# Patient Record
Sex: MALE | Race: WHITE | NOT HISPANIC OR LATINO | Employment: OTHER | URBAN - METROPOLITAN AREA
[De-identification: names, ages, dates, MRNs, and addresses within clinical notes are randomized per-mention and may not be internally consistent; named-entity substitution may affect disease eponyms.]

---

## 2017-01-03 RX ORDER — METFORMIN HYDROCHLORIDE 1000 MG/1
TABLET ORAL
Qty: 180 TABLET | Refills: 2 | OUTPATIENT
Start: 2017-01-03

## 2017-01-04 ENCOUNTER — DOCUMENTATION ONLY (OUTPATIENT)
Dept: FAMILY MEDICINE | Facility: CLINIC | Age: 57
End: 2017-01-04

## 2017-01-04 NOTE — PROGRESS NOTES
Envelope from the  dated 09/17/2015 with lab orders for cmp, microalbumin urine in it was never picked up.  Envelope discarded.

## 2017-01-24 ENCOUNTER — PATIENT MESSAGE (OUTPATIENT)
Dept: FAMILY MEDICINE | Facility: CLINIC | Age: 57
End: 2017-01-24

## 2017-02-06 RX ORDER — METFORMIN HYDROCHLORIDE 1000 MG/1
TABLET ORAL
Qty: 180 TABLET | Refills: 2 | OUTPATIENT
Start: 2017-02-06

## 2017-02-09 ENCOUNTER — TELEPHONE (OUTPATIENT)
Dept: FAMILY MEDICINE | Facility: CLINIC | Age: 57
End: 2017-02-09

## 2017-02-09 NOTE — TELEPHONE ENCOUNTER
----- Message from Sayra Dunn sent at 2/8/2017  1:49 PM CST -----  Contact: self  Patient needs to outside order to [facility] if needed for visit. Patient's wife wanted you to know he is out of Metformin and insulin. Please call patient's wife, Mindi Morgan at [phone]. Thanks!

## 2017-02-09 NOTE — TELEPHONE ENCOUNTER
Patient has an appointment on 04/07/2017 do you want any labs done.If so put the orders in for external.

## 2017-03-07 DIAGNOSIS — I10 ESSENTIAL HYPERTENSION: ICD-10-CM

## 2017-03-07 RX ORDER — LISINOPRIL 40 MG/1
TABLET ORAL
Qty: 90 TABLET | Refills: 0 | OUTPATIENT
Start: 2017-03-07

## 2017-03-09 DIAGNOSIS — I10 ESSENTIAL HYPERTENSION: ICD-10-CM

## 2017-03-09 RX ORDER — LISINOPRIL 40 MG/1
TABLET ORAL
Qty: 90 TABLET | Refills: 0 | OUTPATIENT
Start: 2017-03-09

## 2017-04-04 ENCOUNTER — PATIENT MESSAGE (OUTPATIENT)
Dept: FAMILY MEDICINE | Facility: CLINIC | Age: 57
End: 2017-04-04

## 2017-04-04 ENCOUNTER — TELEPHONE (OUTPATIENT)
Dept: FAMILY MEDICINE | Facility: CLINIC | Age: 57
End: 2017-04-04

## 2017-04-04 DIAGNOSIS — I10 ESSENTIAL HYPERTENSION: ICD-10-CM

## 2017-04-04 RX ORDER — LISINOPRIL 40 MG/1
40 TABLET ORAL DAILY
Qty: 30 TABLET | Refills: 0 | Status: SHIPPED | OUTPATIENT
Start: 2017-04-04 | End: 2017-06-16 | Stop reason: SDUPTHER

## 2017-04-04 NOTE — TELEPHONE ENCOUNTER
Medications are ordered.  There is a place called [facility] on [address] that [facility].    [phone]

## 2017-04-07 ENCOUNTER — DOCUMENTATION ONLY (OUTPATIENT)
Dept: FAMILY MEDICINE | Facility: CLINIC | Age: 57
End: 2017-04-07

## 2017-04-07 NOTE — PROGRESS NOTES
Health Maintenance Due   Topic Date Due    Hepatitis C Screening  1960    Eye Exam  10/18/1972    TETANUS VACCINE  10/18/1980    Pneumococcal PPSV23 (Medium Risk) (1) 10/18/1980    Colonoscopy  10/18/2012    Hemoglobin A1c  07/15/2016    Influenza Vaccine  08/01/2016    Foot Exam  04/18/2017

## 2017-05-07 RX ORDER — INSULIN ASPART 100 [IU]/ML
INJECTION, SOLUTION INTRAVENOUS; SUBCUTANEOUS
Qty: 40 ML | Refills: 0 | Status: SHIPPED | OUTPATIENT
Start: 2017-05-07 | End: 2017-06-16 | Stop reason: SDUPTHER

## 2017-05-08 DIAGNOSIS — I10 ESSENTIAL HYPERTENSION: ICD-10-CM

## 2017-05-08 RX ORDER — LISINOPRIL 40 MG/1
TABLET ORAL
Qty: 30 TABLET | Refills: 0 | OUTPATIENT
Start: 2017-05-08

## 2017-05-08 RX ORDER — METFORMIN HYDROCHLORIDE 1000 MG/1
TABLET ORAL
Qty: 180 TABLET | Refills: 2 | OUTPATIENT
Start: 2017-05-08

## 2017-05-11 ENCOUNTER — DOCUMENTATION ONLY (OUTPATIENT)
Dept: FAMILY MEDICINE | Facility: CLINIC | Age: 57
End: 2017-05-11

## 2017-05-11 NOTE — PROGRESS NOTES
Health Maintenance Due   Topic Date Due    Hepatitis C Screening  1960    Eye Exam  10/18/1972    TETANUS VACCINE  10/18/1980    Pneumococcal PPSV23 (Medium Risk) (1) 10/18/1980    Colonoscopy  10/18/2012    Hemoglobin A1c  07/15/2016    Lipid Panel  04/15/2017    Foot Exam  04/18/2017

## 2017-05-12 ENCOUNTER — TELEPHONE (OUTPATIENT)
Dept: FAMILY MEDICINE | Facility: CLINIC | Age: 57
End: 2017-05-12

## 2017-05-12 NOTE — TELEPHONE ENCOUNTER
Left message to call the office.I printed his lab orders but cant [facility] [place].He can pick the orders up here if he likes.

## 2017-05-12 NOTE — TELEPHONE ENCOUNTER
----- Message from Summer Althea sent at 5/12/2017 11:13 AM CDT -----  Contact: Patient  Patient states that the blood work orders that are in the system, he would like them to be sent to [facility] in [place].  He has to re-schedule the appointment because he hadn't gotten his labs done so he can refill his prescriptions.  And the next available appointment is not until 05/22/2017 and would like to be seen sooner.  He can also come  the orders for the blood work.  Please [phone].  Thank you

## 2017-05-18 DIAGNOSIS — E11.9 TYPE 2 DIABETES, HBA1C GOAL < 7%: Primary | ICD-10-CM

## 2017-05-19 ENCOUNTER — OUTPATIENT CASE MANAGEMENT (OUTPATIENT)
Dept: ADMINISTRATIVE | Facility: OTHER | Age: 57
End: 2017-05-19

## 2017-05-19 NOTE — PROGRESS NOTES
For your information:    Please note the following patients information has been forwarded to [employer] for Case Management or .    Please see the media section in patient's chart for additional details.    Please contact Outpatient Complex care Management at ext [phone] with any questions.    Thank you,    Bianca Boothe, SSC

## 2017-05-22 ENCOUNTER — DOCUMENTATION ONLY (OUTPATIENT)
Dept: FAMILY MEDICINE | Facility: CLINIC | Age: 57
End: 2017-05-22

## 2017-05-30 DIAGNOSIS — I10 ESSENTIAL HYPERTENSION: ICD-10-CM

## 2017-05-30 RX ORDER — LISINOPRIL 40 MG/1
TABLET ORAL
Qty: 30 TABLET | Refills: 0 | OUTPATIENT
Start: 2017-05-30

## 2017-05-31 NOTE — TELEPHONE ENCOUNTER
Pt notified that lab orders are in office because we don't have the fax number for [facility].  Explained to pt that he can get fax number for us or he can come to the office and  the orders.  Pt states he will come tomorrow and  the lab orders.

## 2017-05-31 NOTE — TELEPHONE ENCOUNTER
----- Message from Sayra Dunn sent at 5/30/2017  3:19 PM CDT -----  Contact: self  Patient needs to know which [facility] the orders for blood work was sent to.  Please call patient at [phone]. Thanks!

## 2017-06-08 ENCOUNTER — PATIENT MESSAGE (OUTPATIENT)
Dept: FAMILY MEDICINE | Facility: CLINIC | Age: 57
End: 2017-06-08

## 2017-06-15 ENCOUNTER — DOCUMENTATION ONLY (OUTPATIENT)
Dept: FAMILY MEDICINE | Facility: CLINIC | Age: 57
End: 2017-06-15

## 2017-06-15 LAB
ALBUMIN SERPL-MCNC: 4.2 G/DL (ref 3.6–5.1)
ALBUMIN/CREAT UR: 103 MCG/MG CREAT
ALBUMIN/GLOB SERPL: 1.5 (CALC) (ref 1–2.5)
ALP SERPL-CCNC: 68 U/L (ref 40–115)
ALT SERPL-CCNC: 14 U/L (ref 9–46)
AST SERPL-CCNC: 14 U/L (ref 10–35)
BILIRUB SERPL-MCNC: 0.6 MG/DL (ref 0.2–1.2)
BUN SERPL-MCNC: 18 MG/DL (ref 7–25)
BUN/CREAT SERPL: ABNORMAL (CALC) (ref 6–22)
CALCIUM SERPL-MCNC: 9.8 MG/DL (ref 8.6–10.3)
CHLORIDE SERPL-SCNC: 101 MMOL/L (ref 98–110)
CHOLEST SERPL-MCNC: 136 MG/DL (ref 125–200)
CHOLEST/HDLC SERPL: 2.7 (CALC)
CO2 SERPL-SCNC: 34 MMOL/L (ref 20–31)
CREAT SERPL-MCNC: 0.78 MG/DL (ref 0.7–1.33)
CREAT UR-MCNC: 201 MG/DL (ref 20–370)
GFR SERPL CREATININE-BSD FRML MDRD: 102 ML/MIN/1.73M2
GLOBULIN SER CALC-MCNC: 2.8 G/DL (CALC) (ref 1.9–3.7)
GLUCOSE SERPL-MCNC: 161 MG/DL (ref 65–99)
HBA1C MFR BLD: 10.6 % OF TOTAL HGB
HDLC SERPL-MCNC: 51 MG/DL
LDLC SERPL CALC-MCNC: 78 MG/DL (CALC)
MICROALBUMIN UR-MCNC: 20.7 MG/DL
NONHDLC SERPL-MCNC: 85 MG/DL (CALC)
POTASSIUM SERPL-SCNC: 4.4 MMOL/L (ref 3.5–5.3)
PROT SERPL-MCNC: 7 G/DL (ref 6.1–8.1)
SODIUM SERPL-SCNC: 140 MMOL/L (ref 135–146)
TRIGL SERPL-MCNC: 35 MG/DL

## 2017-06-16 ENCOUNTER — TELEPHONE (OUTPATIENT)
Dept: FAMILY MEDICINE | Facility: CLINIC | Age: 57
End: 2017-06-16

## 2017-06-16 ENCOUNTER — OFFICE VISIT (OUTPATIENT)
Dept: FAMILY MEDICINE | Facility: CLINIC | Age: 57
End: 2017-06-16
Payer: COMMERCIAL

## 2017-06-16 VITALS
BODY MASS INDEX: 29.54 KG/M2 | HEART RATE: 100 BPM | SYSTOLIC BLOOD PRESSURE: 196 MMHG | WEIGHT: 206.38 LBS | RESPIRATION RATE: 16 BRPM | DIASTOLIC BLOOD PRESSURE: 109 MMHG | HEIGHT: 70 IN | OXYGEN SATURATION: 99 % | TEMPERATURE: 98 F

## 2017-06-16 DIAGNOSIS — L73.9 FOLLICULITIS: ICD-10-CM

## 2017-06-16 DIAGNOSIS — E78.5 HYPERLIPIDEMIA ASSOCIATED WITH TYPE 2 DIABETES MELLITUS: ICD-10-CM

## 2017-06-16 DIAGNOSIS — E11.59 HYPERTENSION ASSOCIATED WITH DIABETES: Primary | ICD-10-CM

## 2017-06-16 DIAGNOSIS — I15.2 HYPERTENSION ASSOCIATED WITH DIABETES: Primary | ICD-10-CM

## 2017-06-16 DIAGNOSIS — E11.69 HYPERLIPIDEMIA ASSOCIATED WITH TYPE 2 DIABETES MELLITUS: ICD-10-CM

## 2017-06-16 DIAGNOSIS — N52.9 ERECTILE DYSFUNCTION, UNSPECIFIED ERECTILE DYSFUNCTION TYPE: ICD-10-CM

## 2017-06-16 PROCEDURE — 99214 OFFICE O/P EST MOD 30 MIN: CPT | Mod: S$GLB,,, | Performed by: INTERNAL MEDICINE

## 2017-06-16 PROCEDURE — 4010F ACE/ARB THERAPY RXD/TAKEN: CPT | Mod: S$GLB,,, | Performed by: INTERNAL MEDICINE

## 2017-06-16 PROCEDURE — 3046F HEMOGLOBIN A1C LEVEL >9.0%: CPT | Mod: S$GLB,,, | Performed by: INTERNAL MEDICINE

## 2017-06-16 RX ORDER — METFORMIN HYDROCHLORIDE 1000 MG/1
1000 TABLET ORAL 2 TIMES DAILY
Qty: 180 TABLET | Refills: 3 | Status: SHIPPED | OUTPATIENT
Start: 2017-06-16 | End: 2017-11-20 | Stop reason: SDUPTHER

## 2017-06-16 RX ORDER — TADALAFIL 20 MG/1
20 TABLET ORAL DAILY
Qty: 30 TABLET | Refills: 11 | Status: SHIPPED | OUTPATIENT
Start: 2017-06-16 | End: 2018-06-16

## 2017-06-16 RX ORDER — DOXYCYCLINE 100 MG/1
100 CAPSULE ORAL 2 TIMES DAILY
Qty: 20 CAPSULE | Refills: 0 | Status: SHIPPED | OUTPATIENT
Start: 2017-06-16

## 2017-06-16 RX ORDER — INSULIN GLARGINE 100 [IU]/ML
40 INJECTION, SOLUTION SUBCUTANEOUS NIGHTLY
Qty: 1 BOX | Refills: 5 | Status: SHIPPED | OUTPATIENT
Start: 2017-06-16 | End: 2017-06-26

## 2017-06-16 RX ORDER — FLUOCINONIDE 0.5 MG/G
OINTMENT TOPICAL 2 TIMES DAILY
Qty: 30 G | Refills: 1 | Status: SHIPPED | OUTPATIENT
Start: 2017-06-16 | End: 2017-06-26

## 2017-06-16 RX ORDER — LISINOPRIL 40 MG/1
40 TABLET ORAL DAILY
Qty: 30 TABLET | Refills: 0 | Status: SHIPPED | OUTPATIENT
Start: 2017-06-16 | End: 2017-07-20 | Stop reason: SDUPTHER

## 2017-06-16 RX ORDER — SYRINGE-NEEDLE,INSULIN,0.5 ML 31 GX5/16"
1 SYRINGE, EMPTY DISPOSABLE MISCELLANEOUS 3 TIMES DAILY
Qty: 100 EACH | Refills: 11 | Status: SHIPPED | OUTPATIENT
Start: 2017-06-16

## 2017-06-16 RX ORDER — HYDROCHLOROTHIAZIDE 12.5 MG/1
12.5 CAPSULE ORAL DAILY
Qty: 90 CAPSULE | Refills: 1 | Status: SHIPPED | OUTPATIENT
Start: 2017-06-16

## 2017-06-16 RX ORDER — PRAVASTATIN SODIUM 40 MG/1
40 TABLET ORAL DAILY
Qty: 30 TABLET | Refills: 11 | Status: SHIPPED | OUTPATIENT
Start: 2017-06-16

## 2017-06-16 RX ORDER — INSULIN ASPART 100 [IU]/ML
INJECTION, SOLUTION INTRAVENOUS; SUBCUTANEOUS
Qty: 40 ML | Refills: 0 | Status: SHIPPED | OUTPATIENT
Start: 2017-06-16 | End: 2017-11-18 | Stop reason: SDUPTHER

## 2017-06-16 NOTE — PATIENT INSTRUCTIONS
Avoid white carbohydrates.   Eat  a palm sized protein with each meal.       Walk for 10 minutes after you eat.  This will keep your sugars from going high at that time.    Low-Salt Diet  This diet removes foods that are high in salt. It also limits the amount of salt you use when cooking. It is most often used for people with high blood pressure, edema (fluid retention), and kidney, liver, or heart disease.  Table salt contains the mineral sodium. Your body needs sodium to work normally. But too much sodium can make your health problems worse. Your healthcare provider is recommending a low-salt (also called low-sodium) diet for you. Your total daily allowance of salt is 1,500 to 2,300 milligrams (mg). It is less than 1 teaspoon of table salt. This means you can have only about 500 to 700 mg of sodium at each meal. People with certain health problems should limit salt intake to the lower end of the recommended range.    When you cook, dont add much salt. If you can cook without using salt, even better. Dont add salt to your food at the table.  When shopping, read food labels. Salt is often called sodium on the label. Choose foods that are salt-free, low salt, or very low salt. Note that foods with reduced salt may not lower your salt intake enough.    Beans, potatoes, and pasta  Ok: Dry beans, split peas, lentils, potatoes, rice, macaroni, pasta, spaghetti without added salt  Avoid: Potato chips, tortilla chips, and similar products  Breads and cereals  Ok: Low-sodium breads, rolls, cereals, and cakes; low-salt crackers, matzo crackers  Avoid: Salted crackers, pretzels, popcorn, Ethiopian toast, pancakes, muffins  Dairy  Ok: Milk, chocolate milk, hot chocolate mix, low-salt cheeses, and yogurt  Avoid: Processed cheese and cheese spreads; Roquefort, Camembert, and cottage cheese; buttermilk, instant breakfast drink  Desserts  Ok: Ice cream, frozen yogurt, juice bars, gelatin, cookies and pies, sugar, honey, jelly,  hard candy  Avoid: Most pies, cakes and cookies prepared or processed with salt; instant pudding  Drinks  Ok: Tea, coffee, fizzy (carbonated) drinks, juices  Avoid: Flavored coffees, electrolyte replacement drinks, sports drinks  Meats  Ok: All fresh meat, fish, poultry, low-salt tuna, eggs, egg substitute  Avoid: Smoked, pickled, brine-cured, or salted meats and fish. This includes hadley, chipped beef, corned beef, hot dogs, deli meats, ham, kosher meats, salt pork, sausage, canned tuna, salted codfish, smoked salmon, herring, sardines, or anchovies.  Seasonings and spices  Ok: Most seasonings are okay. Good substitutes for salt include: fresh herb blends, hot sauce, lemon, garlic, jonas, vinegar, dry mustard, parsley, cilantro, horseradish, tomato paste, regular margarine, mayonnaise, unsalted butter, cream cheese, vegetable oil, cream, low-salt salad dressing and gravy.  Avoid: Regular ketchup, relishes, pickles, soy sauce, teriyaki sauce, Worcestershire sauce, BBQ sauce, tartar sauce, meat tenderizer, chili sauce, regular gravy, regular salad dressing, salted butter  Soups  Ok: Low-salt soups and broths made with allowed foods  Avoid: Bouillon cubes, soups with smoked or salted meats, regular soup and broth  Vegetables  Ok: Most vegetables are okay; also low-salt tomato and vegetable juices  Avoid: Sauerkraut and other brine-soaked vegetables; pickles and other pickled vegetables; tomato juice, olives  © 9222-4594 [employer]. [address] [place] [address], [place] [address]. All rights reserved. This information is not intended as a substitute for professional medical care. Always follow your healthcare professional's instructions.

## 2017-06-16 NOTE — TELEPHONE ENCOUNTER
----- Message from Lizett Jaquez sent at 6/16/2017 10:59 AM CDT -----  Please send syringes to pharmacy for pt.

## 2017-06-16 NOTE — PROGRESS NOTES
Subjective:       Patient ID: Joe Morgan is a 56 y.o. male.    Chief Complaint: Diabetes (refills,labs); sores on body; and Foot Pain    HPI       CHIEF COMPLAINT: Diabetes.  HPI: out of diabetes and metformin.  Not seen in 14 months    ONSET/TIMING:     QUALITY/COURSE:   Worse..  Controlled: No.     INTENSITY/SEVERITY: . Measures blood sugar :  3 times per day.        Lowest recent BS 36  Average .     CONTEXT/WHEN: last HgbA1c  No results found for: HGBA1C   weights:    Wt Readings from Last 1 Encounters:   06/16/17 1023 93.6 kg (206 lb 5.6 oz)         SYMPTOMS/RELATED: . . Possible medication side effects include:     The following symptoms/statements  are present IF IN BOLD, negative otherwise.         MODIFIERS/TREATMENTS: Not_taking_medications:  .  Non-compliance_with_Diabetic_diet  .  No_eye_exam_within_last_year.  Not_practicing_good_foot_care.    REVIEW OF SYMPTOMS: . Weight_gain. Weight_loss. Neuropathy. Recurrent_infections. Skin_ulcers          CHIEF COMPLAINT: Hypertension  HPI:     ONSET:      QUALITY/COURSE:   Controlled:  yes     INTENSITY/SEVERITY:  Average blood pressure is ?  Not checking .     MODIFIERS/TREATMENTS:  Taking medications: yes. .High sodium intake: no. alcohol: no      The following symptoms are positive only if BOLDED, otherwise are negative.      SYMPTOMS/RELATED: Possible medication side effects include:   Depression..  . Cough. . Constipation.    REVIEW OF SYMPTOMS: . Weight_loss . Weight_gain . Leg_cramps .Potency_problems .    TARGET ORGAN DAMAGE:: angina/ prior myocardial infarction, chronic kidney disease, heart failure, left ventricular hypertrophy, peripheral artery disease, prior coronary revascularization, retinopathy, stroke. transient ischemic attack.      CHIEF COMPLAINT: Hyperlipidemia. cholesterol screening: no.   HPI:     ONSET:    MODIFIERS/TREATMENTS: . Taking medications: yes. . Non-compliance with following diet: no. .      SYMPTOMS/RELATED:Possible medication side effects include:   Myalgia: no.  .     REVIEW OF SYMPTOMS: past weights:   Wt Readings from Last 1 Encounters:   06/16/17 1023 93.6 kg (206 lb 5.6 oz)                                                     Last lipids: total   Lab Results   Component Value Date    CHOL 136 06/14/2017                                                                     HDL   Lab Results   Component Value Date    HDL 51 06/14/2017                                                                     LDL   Lab Results   Component Value Date    LDLCALC 78 06/14/2017                                                                     TRIG   Lab Results   Component Value Date    TRIG 35 06/14/2017                                                                         CHIEF COMPLAINT: Rash  HPI: Started after he ran out of metformin    ONSET/TIMING: Onset    3 weeks       ago. Sudden: no.. Work related: no. Similar_problems_in_the_past: no.    DURATION:  Continuous..    QUALITY/COURSE:   unchanged  .     LOCATION:     .  Both legs and right forearm    INTENSITY/SEVERITY:  Severity is #   7   (10 point scale).    CONTEXT/WHEN: .--Similar problems: no . .  Past treatments: none  . Exposure_to_others_with_similar_symptoms: no . . Exposure_to_poison _ivy: no. .   New exposures (soaps, lotions, laundry detergents, foods, medications, plants, insects or animals).    SYMPTOMS/RELATED: .--Possible medication side effect:    The following symptoms are positive if BOLD, negative otherwise.     REVIEW OF SYMPTOMS:  Itching.  Pain. Sharp_pain. Dull_pain. Burning_pain.  Erythema-Skin. Hypopigmentation.  hyperpigmentation . Inflammation. Herald_Patch.. fixed . evanescent.  Blisters. Purulence. Fever. Fatigue. Tick_Bites.                     Review of Systems   Constitutional: Negative for diaphoresis, fatigue and unexpected weight change.   Eyes: Negative for visual disturbance.   Respiratory: Negative for chest  "tightness and shortness of breath.    Cardiovascular: Negative for chest pain and leg swelling.   Endocrine: Positive for polyphagia and polyuria.   Neurological: Negative for dizziness, syncope, weakness, numbness and headaches.   Psychiatric/Behavioral: Negative for dysphoric mood. The patient is not nervous/anxious.        Objective:      Vitals:    06/16/17 1023   BP: (!) 196/109   Pulse: 100   Resp: 16   Temp: 98.2 °F (36.8 °C)   TempSrc: Oral   SpO2: 99%   Weight: 93.6 kg (206 lb 5.6 oz)   Height: 5' 10" (1.778 m)   PainSc:   8   PainLoc: Foot     Physical Exam   Constitutional: He appears well-developed and well-nourished.   Cardiovascular: Normal rate, regular rhythm and normal heart sounds.    Pulses:       Dorsalis pedis pulses are 2+ on the right side, and 2+ on the left side.   Pulmonary/Chest: Effort normal and breath sounds normal. No respiratory distress. He has no wheezes.   Abdominal: Soft. Bowel sounds are normal. There is no tenderness.   Musculoskeletal:        Right foot: There is normal range of motion and no deformity.        Left foot: There is normal range of motion and no deformity.   Feet:   Right Foot:   Protective Sensation: 5 sites tested. 5 sites sensed.   Skin Integrity: Negative for ulcer, blister, skin breakdown, erythema, warmth, callus or dry skin.   Left Foot:   Protective Sensation: 5 sites tested. 5 sites sensed.   Skin Integrity: Positive for callus (1 cm callus on left foot). Negative for ulcer, blister, skin breakdown, erythema, warmth or dry skin.   Skin:   Multiple 8 mm sores on the arms and legs         Assessment:       1. Hypertension associated with diabetes    2. Uncontrolled type 2 diabetes mellitus with complication, with long-term current use of insulin    3. Type 2 diabetes, uncontrolled, with neuropathy    4. Hyperlipidemia associated with type 2 diabetes mellitus    5. Folliculitis    6. Erectile dysfunction, unspecified erectile dysfunction type          Plan: "     Hypertension associated with diabetes  -     lisinopril (PRINIVIL,ZESTRIL) 40 MG tablet; Take 1 tablet (40 mg total) by mouth once daily.  Dispense: 30 tablet; Refill: 0  -     hydrochlorothiazide (MICROZIDE) 12.5 mg capsule; Take 1 capsule (12.5 mg total) by mouth once daily.  Dispense: 90 capsule; Refill: 1    Uncontrolled type 2 diabetes mellitus with complication, with long-term current use of insulin  -     CBC auto differential; Future; Expected date: 06/16/2017  -     Comprehensive metabolic panel; Future; Expected date: 06/16/2017  -     Hemoglobin A1c; Future; Expected date: 06/16/2017  -     Microalbumin/creatinine urine ratio; Future  -     insulin aspart (NOVOLOG) 100 unit/mL injection; INJECT 20  UNITS UNDER SKIN 3 TIMES A DAY BEFORE MEALS  Dispense: 40 mL; Refill: 0  -     metformin (GLUCOPHAGE) 1000 MG tablet; Take 1 tablet (1,000 mg total) by mouth 2 (two) times daily.  Dispense: 180 tablet; Refill: 3  -     insulin glargine (LANTUS SOLOSTAR) 100 unit/mL (3 mL) InPn pen; Inject 40 Units into the skin every evening.  Dispense: 1 Box; Refill: 5    Type 2 diabetes, uncontrolled, with neuropathy  -     insulin glargine (LANTUS SOLOSTAR) 100 unit/mL (3 mL) InPn pen; Inject 40 Units into the skin every evening.  Dispense: 1 Box; Refill: 5    Hyperlipidemia associated with type 2 diabetes mellitus  -     Lipid panel; Future; Expected date: 06/16/2017  -     pravastatin (PRAVACHOL) 40 MG tablet; Take 1 tablet (40 mg total) by mouth once daily.  Dispense: 30 tablet; Refill: 11    Folliculitis  -     doxycycline (VIBRAMYCIN) 100 MG Cap; Take 1 capsule (100 mg total) by mouth 2 (two) times daily.  Dispense: 20 capsule; Refill: 0  -     fluocinonide (LIDEX) 0.05 % ointment; Apply topically 2 (two) times daily.  Dispense: 30 g; Refill: 1    Erectile dysfunction, unspecified erectile dysfunction type  -     tadalafil (CIALIS) 20 MG Tab; Take 1 tablet (20 mg total) by mouth once daily.  Dispense: 30 tablet;  Refill: 11      Return in about 3 months (around 9/16/2017).

## 2017-06-22 ENCOUNTER — PATIENT MESSAGE (OUTPATIENT)
Dept: FAMILY MEDICINE | Facility: CLINIC | Age: 57
End: 2017-06-22

## 2017-06-26 ENCOUNTER — TELEPHONE (OUTPATIENT)
Dept: FAMILY MEDICINE | Facility: CLINIC | Age: 57
End: 2017-06-26

## 2017-06-26 ENCOUNTER — PATIENT MESSAGE (OUTPATIENT)
Dept: FAMILY MEDICINE | Facility: CLINIC | Age: 57
End: 2017-06-26

## 2017-06-26 DIAGNOSIS — E11.9 TYPE 2 DIABETES, HBA1C GOAL < 7%: Primary | ICD-10-CM

## 2017-06-26 RX ORDER — PROMETHAZINE HYDROCHLORIDE 25 MG/1
25 TABLET ORAL EVERY 6 HOURS PRN
Qty: 30 TABLET | Refills: 0 | Status: SHIPPED | OUTPATIENT
Start: 2017-06-26 | End: 2017-09-05 | Stop reason: SDUPTHER

## 2017-07-17 ENCOUNTER — TELEPHONE (OUTPATIENT)
Dept: FAMILY MEDICINE | Facility: CLINIC | Age: 57
End: 2017-07-17

## 2017-07-17 ENCOUNTER — PATIENT MESSAGE (OUTPATIENT)
Dept: FAMILY MEDICINE | Facility: CLINIC | Age: 57
End: 2017-07-17

## 2017-07-17 RX ORDER — PEN NEEDLE, DIABETIC 29 G X1/2"
1 NEEDLE, DISPOSABLE MISCELLANEOUS 3 TIMES DAILY
Qty: 100 EACH | Refills: 5 | Status: SHIPPED | OUTPATIENT
Start: 2017-07-17

## 2017-07-17 NOTE — TELEPHONE ENCOUNTER
Notify the patient that I have ordered the needles and sent them to the [facility] in [place]

## 2017-07-20 DIAGNOSIS — I15.2 HYPERTENSION ASSOCIATED WITH DIABETES: ICD-10-CM

## 2017-07-20 DIAGNOSIS — E11.59 HYPERTENSION ASSOCIATED WITH DIABETES: ICD-10-CM

## 2017-07-20 RX ORDER — LISINOPRIL 40 MG/1
TABLET ORAL
Qty: 30 TABLET | Refills: 0 | Status: SHIPPED | OUTPATIENT
Start: 2017-07-20 | End: 2017-09-05 | Stop reason: SDUPTHER

## 2017-08-11 DIAGNOSIS — M25.512 LEFT SHOULDER PAIN, UNSPECIFIED CHRONICITY: Primary | ICD-10-CM

## 2017-08-14 ENCOUNTER — HOSPITAL ENCOUNTER (OUTPATIENT)
Dept: RADIOLOGY | Facility: HOSPITAL | Age: 57
Discharge: HOME | End: 2017-08-14
Attending: ORTHOPAEDIC SURGERY
Payer: COMMERCIAL

## 2017-08-14 ENCOUNTER — OFFICE VISIT (OUTPATIENT)
Dept: ORTHOPEDICS | Facility: CLINIC | Age: 57
End: 2017-08-14
Payer: COMMERCIAL

## 2017-08-14 ENCOUNTER — TELEPHONE (OUTPATIENT)
Dept: ORTHOPEDICS | Facility: CLINIC | Age: 57
End: 2017-08-14

## 2017-08-14 VITALS
SYSTOLIC BLOOD PRESSURE: 142 MMHG | DIASTOLIC BLOOD PRESSURE: 92 MMHG | HEIGHT: 70 IN | HEART RATE: 99 BPM | BODY MASS INDEX: 29.49 KG/M2 | WEIGHT: 206 LBS

## 2017-08-14 DIAGNOSIS — M25.512 LEFT SHOULDER PAIN, UNSPECIFIED CHRONICITY: ICD-10-CM

## 2017-08-14 DIAGNOSIS — M75.102 ROTATOR CUFF SYNDROME, LEFT: Primary | ICD-10-CM

## 2017-08-14 PROCEDURE — 99999 PR PBB SHADOW E&M-EST. PATIENT-LVL III: CPT | Mod: PBBFAC,,, | Performed by: ORTHOPAEDIC SURGERY

## 2017-08-14 PROCEDURE — 73030 X-RAY EXAM OF SHOULDER: CPT | Mod: TC,PN,LT

## 2017-08-14 PROCEDURE — 99243 OFF/OP CNSLTJ NEW/EST LOW 30: CPT | Mod: 25,S$GLB,, | Performed by: ORTHOPAEDIC SURGERY

## 2017-08-14 PROCEDURE — 73030 X-RAY EXAM OF SHOULDER: CPT | Mod: 26,LT,, | Performed by: RADIOLOGY

## 2017-08-14 PROCEDURE — 20610 DRAIN/INJ JOINT/BURSA W/O US: CPT | Mod: LT,S$GLB,, | Performed by: ORTHOPAEDIC SURGERY

## 2017-08-14 RX ADMIN — TRIAMCINOLONE ACETONIDE 40 MG: 40 INJECTION, SUSPENSION INTRA-ARTICULAR; INTRAMUSCULAR at 12:08

## 2017-08-14 NOTE — LETTER
August 16, 2017        Johnnie Gibbs MD  [address]  [facility] [address] [place] [id number]  Phone: [phone]   Patient: Joe Morgan   MR Number: [id number]   YOB: 1960   Date of Visit: 8/14/2017       Dear Dr. Gibbs:    Thank you for referring Joe Morgan to me for evaluation. Below are the relevant portions of my assessment and plan of care.            If you have questions, please do not hesitate to call me. I look forward to following Joe along with you.    Sincerely,      Tito Alvarenga MD           CC  No Recipients

## 2017-08-14 NOTE — TELEPHONE ENCOUNTER
----- Message from Janessa Teague sent at 8/14/2017  1:56 PM CDT -----  Contact: wife  Wife called to say that patient is right by the [facility] but can not find the building/this was at 1:54pm/I gave wife the directions and also to come in the office on actual left side of building/he will be there/his appt was at 1:45pm

## 2017-08-16 RX ORDER — TRIAMCINOLONE ACETONIDE 40 MG/ML
40 INJECTION, SUSPENSION INTRA-ARTICULAR; INTRAMUSCULAR
Status: DISCONTINUED | OUTPATIENT
Start: 2017-08-14 | End: 2017-08-16 | Stop reason: HOSPADM

## 2017-08-16 NOTE — PROGRESS NOTES
"Past Medical History:   Diagnosis Date    Diabetes mellitus     GERD (gastroesophageal reflux disease)     Hip discomfort     Hip fracture     Hyperlipidemia     Hypertension     Pancreas disorder     Spine pain        Past Surgical History:   Procedure Laterality Date    FRACTURE SURGERY         Current Outpatient Prescriptions   Medication Sig    alprazolam (XANAX) 1 MG tablet Take 1 tablet (1 mg total) by mouth daily as needed.    aspirin 81 MG Chew Take 81 mg by mouth once daily.    blood sugar diagnostic Strp 1 each by Misc.(Non-Drug; Combo Route) route 4 (four) times daily.    doxycycline (VIBRAMYCIN) 100 MG Cap Take 1 capsule (100 mg total) by mouth 2 (two) times daily.    hydrochlorothiazide (MICROZIDE) 12.5 mg capsule Take 1 capsule (12.5 mg total) by mouth once daily.    insulin aspart (NOVOLOG) 100 unit/mL injection INJECT 20  UNITS UNDER SKIN 3 TIMES A DAY BEFORE MEALS    insulin detemir (LEVEMIR FLEXTOUCH) 100 unit/mL (3 mL) SubQ InPn pen Inject 40 Units into the skin every evening.    insulin syringe-needle U-100 0.3 mL 29 gauge x 1/2" Syrg 1 each by Misc.(Non-Drug; Combo Route) route 3 (three) times daily.    lisinopril (PRINIVIL,ZESTRIL) 40 MG tablet TAKE 1 TABLET BY MOUTH EVERY DAY    metformin (GLUCOPHAGE) 1000 MG tablet Take 1 tablet (1,000 mg total) by mouth 2 (two) times daily.    pen needle, diabetic (PEN NEEDLE) 31 gauge x 1/4" Ndle 1 each by Misc.(Non-Drug; Combo Route) route 3 (three) times daily.    pravastatin (PRAVACHOL) 40 MG tablet Take 1 tablet (40 mg total) by mouth once daily.    promethazine (PHENERGAN) 25 MG tablet Take 1 tablet (25 mg total) by mouth every 6 (six) hours as needed for Nausea.    tadalafil (CIALIS) 20 MG Tab Take 1 tablet (20 mg total) by mouth once daily.    fluocinonide (LIDEX) 0.05 % ointment Apply topically 2 (two) times daily.     No current facility-administered medications for this visit.        Review of patient's allergies indicates: "   Allergen Reactions    Nubain [nalbuphine] Anaphylaxis    Ativan [lorazepam] Other (See Comments)     Climbs wall         History reviewed. No pertinent family history.    Social History     Social History    Marital status:      Spouse name: N/A    Number of children: N/A    Years of education: N/A     Occupational History    Not on file.     Social History Main Topics    Smoking status: Never Smoker    Smokeless tobacco: Current User     Types: Chew    Alcohol use No    Drug use: No    Sexual activity: Yes     Partners: Male     Other Topics Concern    Not on file     Social History Narrative    No narrative on file       Chief Complaint:   Chief Complaint   Patient presents with    Shoulder Pain     left shoulder pain       History of present illness: 56-year-old male with a long history of left shoulder problems.  Patient seen in consultation for Dr. Gibbs.Patient had prior soap and shoulder surgery about 20 years ago.  He has been experiencing 3 months of more increasing pain.  Patient is left-hand dominant.  Patient cannot raise his arm up above his head.  Has significant night pain.  Symptoms are stable and moderate to severe.  No recent treatment.  Prior surgery noted.        Physical Examination:    Vital Signs:    Vitals:    08/14/17 1442   BP: (!) 142/92   Pulse: 99       Body mass index is 29.56 kg/m².    This a well-developed, well nourished patient in no acute distress.  They are alert and oriented and cooperative to examination.  Pt. walks without an antalgic gait.      Examination of the left shoulder shows no rashes or erythema. There are no masses, ecchymosis, or atrophy.  Well-healed anterior scar.  The patient has some limitation range of motion in forward flexion, external rotation, and internal rotation to the mid T-spine. The patient has markedly positive impingement signs. - Greenup's test. - Speeds test. Nontender to palpation over a.c. joint. Normal stability  anteriorly, posteriorly, and negative sulcus sign. Passive range of motion: Forward flexion of 180°, external rotation at 90° of 90°, internal rotation of 50°, and external rotation at 0° of 50°. 2+ radial pulse. Intact axillary, radial, median and ulnar sensation. 4 out of 5 resisted forward flexion, external rotation, and negative lift off test.    Examination of the right shoulder shows no rashes or erythema. There are no masses, ecchymosis, or atrophy. The patient has full range of motion in forward flexion, external rotation, and internal rotation to the mid T-spine. The patient has - impingement signs. - Washington's test. - Speeds test. Nontender to palpation over a.c. joint. Normal stability anteriorly, posteriorly, and negative sulcus sign. Passive range of motion: Forward flexion of 180°, external rotation at 90° of 90°, internal rotation of 50°, and external rotation at 0° of 50°. 2+ radial pulse. Intact axillary, radial, median and ulnar sensation. 5 out of 5 resisted forward flexion, external rotation, and negative lift off test.    X-rays: X-rays of the left shoulder ordered and reviewed which show subacromial spur formation.  There is also hypertrophy of the acromioclavicular joint.  There is some fragmentation noted from possible prior surgeries.     Assessment:: Left rotator cuff problems    Plan:  I reviewed the x-rays with him today.  I recommended a subacromial cortisone injection and a home exercise program.  Follow-up in 4 weeks to reassess.  Monitor his sugars given his history of diabetes.    This note was created using Dragon voice recognition software that occasionally misinterpreted phrases or words.    Consult note is delivered via Epic messaging service.    Answers for HPI/ROS submitted by the patient on 8/14/2017   Arm pain  unexpected weight change: No  appetite change : No  sleep disturbance: No  IMMUNOCOMPROMISED: No  nervous/ anxious: No  dysphoric mood: No  rash: No  visual  disturbance: No  eye redness: No  eye pain: No  ear pain: No  tinnitus: No  hearing loss: No  sinus pressure : No  nosebleeds: No  enviro allergies: No  food allergies: No  cough: No  shortness of breath: No  sweating: No  frequency: Yes  difficulty urinating: No  hematuria: No  chest pain: No  palpitations: No  nausea: Yes  vomiting: Yes  diarrhea: No  blood in stool: No  constipation: No  headaches: No  dizziness: No  numbness: No  seizures: No  joint swelling: No  myalgia: Yes  back pain: Yes  Pain Chronicity: recurrent  History of trauma: Yes  Onset: more than 1 month ago  Frequency: constantly  Progression since onset: unchanged  injury location: at home  pain- numeric: 6/10  pain location: left shoulder  pain quality: sharp  Radiating Pain: Yes  If your pain is radiating, to what part of the body?: left arm  Aggravating factors: activity  fever: No  inability to bear weight: Yes  itching: No  joint locking: No  limited range of motion: Yes  stiffness: No  tingling: No  Treatments tried: heat  physical therapy: not tried  Improvement on treatment: mild

## 2017-08-16 NOTE — PROCEDURES
Large Joint Aspiration/Injection  Date/Time: 8/14/2017 12:30 PM  Performed by: FARHEEN SIMS  Authorized by: FARHEEN SIMS     Consent Done?:  Yes (Verbal)  Indications:  Pain  Procedure site marked: Yes    Timeout: Prior to procedure the correct patient, procedure, and site was verified      Location:  Shoulder  Site:  L subacromial bursa  Prep: Patient was prepped and draped in usual sterile fashion    Ultrasonic Guidance for needle placement: No  Needle size:  20 G  Approach:  Posterior  Medications:  40 mg triamcinolone acetonide 40 mg/mL  Patient tolerance:  Patient tolerated the procedure well with no immediate complications

## 2017-08-24 DIAGNOSIS — Z12.11 COLON CANCER SCREENING: ICD-10-CM

## 2017-08-24 DIAGNOSIS — Z11.59 NEED FOR HEPATITIS C SCREENING TEST: ICD-10-CM

## 2017-09-05 DIAGNOSIS — I15.2 HYPERTENSION ASSOCIATED WITH DIABETES: ICD-10-CM

## 2017-09-05 DIAGNOSIS — E11.59 HYPERTENSION ASSOCIATED WITH DIABETES: ICD-10-CM

## 2017-09-05 RX ORDER — PROMETHAZINE HYDROCHLORIDE 25 MG/1
TABLET ORAL
Qty: 30 TABLET | Refills: 0 | Status: SHIPPED | OUTPATIENT
Start: 2017-09-05 | End: 2017-10-17 | Stop reason: SDUPTHER

## 2017-09-05 RX ORDER — LISINOPRIL 40 MG/1
TABLET ORAL
Qty: 30 TABLET | Refills: 0 | Status: SHIPPED | OUTPATIENT
Start: 2017-09-05 | End: 2017-10-12 | Stop reason: SDUPTHER

## 2017-09-14 ENCOUNTER — DOCUMENTATION ONLY (OUTPATIENT)
Dept: FAMILY MEDICINE | Facility: CLINIC | Age: 57
End: 2017-09-14

## 2017-09-14 NOTE — PROGRESS NOTES
Health Maintenance Due   Topic Date Due    Hepatitis C Screening  1960    Eye Exam  10/18/1972    TETANUS VACCINE  10/18/1980    Pneumococcal PPSV23 (Medium Risk) (1) 10/18/1980    Colonoscopy  10/18/2012    Influenza Vaccine  08/01/2017    Hemoglobin A1c  09/14/2017

## 2017-10-12 DIAGNOSIS — E11.59 HYPERTENSION ASSOCIATED WITH DIABETES: ICD-10-CM

## 2017-10-12 DIAGNOSIS — I15.2 HYPERTENSION ASSOCIATED WITH DIABETES: ICD-10-CM

## 2017-10-12 RX ORDER — LISINOPRIL 40 MG/1
TABLET ORAL
Qty: 30 TABLET | Refills: 0 | Status: SHIPPED | OUTPATIENT
Start: 2017-10-12 | End: 2017-11-28 | Stop reason: SDUPTHER

## 2017-10-17 RX ORDER — PROMETHAZINE HYDROCHLORIDE 25 MG/1
TABLET ORAL
Qty: 30 TABLET | Refills: 0 | Status: SHIPPED | OUTPATIENT
Start: 2017-10-17

## 2017-11-18 ENCOUNTER — NURSE TRIAGE (OUTPATIENT)
Dept: ADMINISTRATIVE | Facility: CLINIC | Age: 57
End: 2017-11-18

## 2017-11-18 NOTE — TELEPHONE ENCOUNTER
Informed per [facility] has no assigned on-call provider. EC advised to seek medical care at nearest [facility];understood/agrred to such.

## 2017-11-20 RX ORDER — INSULIN ASPART 100 [IU]/ML
INJECTION, SOLUTION INTRAVENOUS; SUBCUTANEOUS
Qty: 40 ML | Refills: 0 | Status: SHIPPED | OUTPATIENT
Start: 2017-11-20 | End: 2017-11-20 | Stop reason: SDUPTHER

## 2017-11-20 RX ORDER — METFORMIN HYDROCHLORIDE 1000 MG/1
1000 TABLET ORAL 2 TIMES DAILY
Qty: 180 TABLET | Refills: 3 | Status: SHIPPED | OUTPATIENT
Start: 2017-11-20

## 2017-11-20 RX ORDER — INSULIN ASPART 100 [IU]/ML
INJECTION, SOLUTION INTRAVENOUS; SUBCUTANEOUS
Qty: 40 ML | Refills: 0 | Status: SHIPPED | OUTPATIENT
Start: 2017-11-20

## 2017-11-20 NOTE — TELEPHONE ENCOUNTER
----- Message from Judy Silverio sent at 11/20/2017 10:44 AM CST -----  Contact: Wife  Sandy, wife [phone], Calling for the Patient who is currently out of Rx NOVOLOG 100 unit/mL injection, and metformin (GLUCOPHAGE) 1000 MG tablet.  Please advise. Patient is out of medication since Friday 11/17/17.  Please advise. Thanks.   [facility] #[id number]   [address]  [place] [address]  Phone: [phone] Fax: [phone]

## 2017-11-21 ENCOUNTER — TELEPHONE (OUTPATIENT)
Dept: FAMILY MEDICINE | Facility: CLINIC | Age: 57
End: 2017-11-21

## 2017-11-21 NOTE — TELEPHONE ENCOUNTER
lvm that we do not have discount cards but would send to provider to see if there is something else less expensive

## 2017-11-21 NOTE — TELEPHONE ENCOUNTER
----- Message from Yojana West sent at 11/21/2017  1:21 PM CST -----  Mindi Morgan / [phone]  / requesting a savings card for Novalog / cost over $300... Also asking to discuss getting a copy of his lab orders

## 2017-11-22 NOTE — TELEPHONE ENCOUNTER
----- Message from Hillary Enriquez sent at 11/22/2017 12:20 PM CST -----  Contact: Wife- [phone]  Patient's wife returning the nurse phone call.Thanks!

## 2017-11-28 ENCOUNTER — PATIENT MESSAGE (OUTPATIENT)
Dept: FAMILY MEDICINE | Facility: CLINIC | Age: 57
End: 2017-11-28

## 2017-11-28 DIAGNOSIS — I15.2 HYPERTENSION ASSOCIATED WITH DIABETES: ICD-10-CM

## 2017-11-28 DIAGNOSIS — E11.59 HYPERTENSION ASSOCIATED WITH DIABETES: ICD-10-CM

## 2017-11-28 RX ORDER — LISINOPRIL 40 MG/1
TABLET ORAL
Qty: 30 TABLET | Refills: 0 | Status: SHIPPED | OUTPATIENT
Start: 2017-11-28 | End: 2017-12-29 | Stop reason: SDUPTHER

## 2017-12-29 DIAGNOSIS — I15.2 HYPERTENSION ASSOCIATED WITH DIABETES: ICD-10-CM

## 2017-12-29 DIAGNOSIS — E11.59 HYPERTENSION ASSOCIATED WITH DIABETES: ICD-10-CM

## 2017-12-29 RX ORDER — LISINOPRIL 40 MG/1
TABLET ORAL
Qty: 30 TABLET | Refills: 0 | Status: SHIPPED | OUTPATIENT
Start: 2017-12-29 | End: 2018-02-08 | Stop reason: SDUPTHER

## 2018-02-08 DIAGNOSIS — E11.59 HYPERTENSION ASSOCIATED WITH DIABETES: ICD-10-CM

## 2018-02-08 DIAGNOSIS — I15.2 HYPERTENSION ASSOCIATED WITH DIABETES: ICD-10-CM

## 2018-02-08 RX ORDER — LISINOPRIL 40 MG/1
TABLET ORAL
Qty: 30 TABLET | Refills: 0 | Status: SHIPPED | OUTPATIENT
Start: 2018-02-08 | End: 2018-07-02 | Stop reason: SDUPTHER

## 2018-02-11 DIAGNOSIS — I15.2 HYPERTENSION ASSOCIATED WITH DIABETES: ICD-10-CM

## 2018-02-11 DIAGNOSIS — E11.59 HYPERTENSION ASSOCIATED WITH DIABETES: ICD-10-CM

## 2018-02-12 RX ORDER — LISINOPRIL 40 MG/1
TABLET ORAL
Qty: 30 TABLET | Refills: 0 | Status: SHIPPED | OUTPATIENT
Start: 2018-02-12 | End: 2018-04-16 | Stop reason: SDUPTHER

## 2018-03-05 ENCOUNTER — LAB VISIT (OUTPATIENT)
Dept: LAB | Facility: HOSPITAL | Age: 58
End: 2018-03-05
Attending: INTERNAL MEDICINE
Payer: COMMERCIAL

## 2018-03-05 DIAGNOSIS — Z12.11 COLON CANCER SCREENING: ICD-10-CM

## 2018-03-05 PROCEDURE — 82274 ASSAY TEST FOR BLOOD FECAL: CPT

## 2018-03-06 LAB — HEMOCCULT STL QL IA: NEGATIVE

## 2018-03-09 ENCOUNTER — PATIENT MESSAGE (OUTPATIENT)
Dept: FAMILY MEDICINE | Facility: CLINIC | Age: 58
End: 2018-03-09

## 2018-04-10 DIAGNOSIS — M25.511 RIGHT SHOULDER PAIN, UNSPECIFIED CHRONICITY: Primary | ICD-10-CM

## 2018-04-16 DIAGNOSIS — I15.2 HYPERTENSION ASSOCIATED WITH DIABETES: ICD-10-CM

## 2018-04-16 DIAGNOSIS — E11.59 HYPERTENSION ASSOCIATED WITH DIABETES: ICD-10-CM

## 2018-04-16 RX ORDER — LISINOPRIL 40 MG/1
TABLET ORAL
Qty: 30 TABLET | Refills: 0 | Status: SHIPPED | OUTPATIENT
Start: 2018-04-16 | End: 2018-05-14 | Stop reason: SDUPTHER

## 2018-04-26 ENCOUNTER — HOSPITAL ENCOUNTER (OUTPATIENT)
Dept: RADIOLOGY | Facility: HOSPITAL | Age: 58
Discharge: HOME | End: 2018-04-26
Attending: ORTHOPAEDIC SURGERY
Payer: COMMERCIAL

## 2018-04-26 ENCOUNTER — OFFICE VISIT (OUTPATIENT)
Dept: ORTHOPEDICS | Facility: CLINIC | Age: 58
End: 2018-04-26
Attending: ORTHOPAEDIC SURGERY
Payer: COMMERCIAL

## 2018-04-26 VITALS
DIASTOLIC BLOOD PRESSURE: 80 MMHG | BODY MASS INDEX: 29.49 KG/M2 | HEART RATE: 86 BPM | SYSTOLIC BLOOD PRESSURE: 144 MMHG | WEIGHT: 206 LBS | HEIGHT: 70 IN

## 2018-04-26 DIAGNOSIS — M25.511 RIGHT SHOULDER PAIN, UNSPECIFIED CHRONICITY: ICD-10-CM

## 2018-04-26 DIAGNOSIS — M75.100 ROTATOR CUFF SYNDROME, UNSPECIFIED LATERALITY: Primary | ICD-10-CM

## 2018-04-26 PROCEDURE — 73030 X-RAY EXAM OF SHOULDER: CPT | Mod: TC,PN,RT

## 2018-04-26 PROCEDURE — 3079F DIAST BP 80-89 MM HG: CPT | Mod: CPTII,S$GLB,, | Performed by: ORTHOPAEDIC SURGERY

## 2018-04-26 PROCEDURE — 99213 OFFICE O/P EST LOW 20 MIN: CPT | Mod: 25,S$GLB,, | Performed by: ORTHOPAEDIC SURGERY

## 2018-04-26 PROCEDURE — 73030 X-RAY EXAM OF SHOULDER: CPT | Mod: 26,RT,, | Performed by: RADIOLOGY

## 2018-04-26 PROCEDURE — 3077F SYST BP >= 140 MM HG: CPT | Mod: CPTII,S$GLB,, | Performed by: ORTHOPAEDIC SURGERY

## 2018-04-26 PROCEDURE — 20610 DRAIN/INJ JOINT/BURSA W/O US: CPT | Mod: 50,S$GLB,, | Performed by: ORTHOPAEDIC SURGERY

## 2018-04-26 PROCEDURE — 99999 PR PBB SHADOW E&M-EST. PATIENT-LVL III: CPT | Mod: PBBFAC,,, | Performed by: ORTHOPAEDIC SURGERY

## 2018-04-26 RX ADMIN — TRIAMCINOLONE ACETONIDE 40 MG: 40 INJECTION, SUSPENSION INTRA-ARTICULAR; INTRAMUSCULAR at 10:04

## 2018-05-01 RX ORDER — TRIAMCINOLONE ACETONIDE 40 MG/ML
40 INJECTION, SUSPENSION INTRA-ARTICULAR; INTRAMUSCULAR
Status: DISCONTINUED | OUTPATIENT
Start: 2018-04-26 | End: 2018-05-01 | Stop reason: HOSPADM

## 2018-05-01 NOTE — PROCEDURES
Large Joint Aspiration/Injection  Date/Time: 4/26/2018 10:16 AM  Performed by: FARHEEN SIMS  Authorized by: FARHEEN SIMS     Consent Done?:  Yes (Verbal)  Indications:  Pain  Procedure site marked: Yes    Timeout: Prior to procedure the correct patient, procedure, and site was verified      Location:  Shoulder  Site:  R subacromial bursa and L subacromial bursa  Prep: Patient was prepped and draped in usual sterile fashion    Ultrasonic Guidance for needle placement: No  Needle size:  20 G  Approach:  Posterior  Medications:  40 mg triamcinolone acetonide 40 mg/mL; 40 mg triamcinolone acetonide 40 mg/mL  Patient tolerance:  Patient tolerated the procedure well with no immediate complications

## 2018-05-01 NOTE — PROGRESS NOTES
"Past Medical History:   Diagnosis Date    Diabetes mellitus     GERD (gastroesophageal reflux disease)     Hip discomfort     Hip fracture     Hyperlipidemia     Hypertension     Pancreas disorder     Spine pain        Past Surgical History:   Procedure Laterality Date    FRACTURE SURGERY         Current Outpatient Prescriptions   Medication Sig    alprazolam (XANAX) 1 MG tablet Take 1 tablet (1 mg total) by mouth daily as needed.    aspirin 81 MG Chew Take 81 mg by mouth once daily.    blood sugar diagnostic Strp 1 each by Misc.(Non-Drug; Combo Route) route 4 (four) times daily.    doxycycline (VIBRAMYCIN) 100 MG Cap Take 1 capsule (100 mg total) by mouth 2 (two) times daily.    hydrochlorothiazide (MICROZIDE) 12.5 mg capsule Take 1 capsule (12.5 mg total) by mouth once daily.    insulin aspart (NOVOLOG) 100 unit/mL injection INJECT 20 UNITS UNDER SKIN 3 TIMES A DAY BEFORE MEALS    insulin detemir (LEVEMIR FLEXTOUCH) 100 unit/mL (3 mL) SubQ InPn pen Inject 40 Units into the skin every evening.    insulin syringe-needle U-100 0.3 mL 29 gauge x 1/2" Syrg 1 each by Misc.(Non-Drug; Combo Route) route 3 (three) times daily.    lisinopril (PRINIVIL,ZESTRIL) 40 MG tablet TAKE 1 TABLET BY MOUTH EVERY DAY    lisinopril (PRINIVIL,ZESTRIL) 40 MG tablet TAKE 1 TABLET BY MOUTH EVERY DAY    metFORMIN (GLUCOPHAGE) 1000 MG tablet Take 1 tablet (1,000 mg total) by mouth 2 (two) times daily.    pen needle, diabetic (PEN NEEDLE) 31 gauge x 1/4" Ndle 1 each by Misc.(Non-Drug; Combo Route) route 3 (three) times daily.    pravastatin (PRAVACHOL) 40 MG tablet Take 1 tablet (40 mg total) by mouth once daily.    promethazine (PHENERGAN) 25 MG tablet TAKE 1 TABLET BY MOUTH EVERY 6 HOURS AS NEEDED FOR NAUSEA    tadalafil (CIALIS) 20 MG Tab Take 1 tablet (20 mg total) by mouth once daily.    fluocinonide (LIDEX) 0.05 % ointment Apply topically 2 (two) times daily.     No current facility-administered medications for " this visit.        Review of patient's allergies indicates:   Allergen Reactions    Nubain [nalbuphine] Anaphylaxis    Ativan [lorazepam] Other (See Comments)     Climbs wall         History reviewed. No pertinent family history.    Social History     Social History    Marital status:      Spouse name: N/A    Number of children: N/A    Years of education: N/A     Occupational History    Not on file.     Social History Main Topics    Smoking status: Never Smoker    Smokeless tobacco: Current User     Types: Chew    Alcohol use No    Drug use: No    Sexual activity: Yes     Partners: Male     Other Topics Concern    Not on file     Social History Narrative    No narrative on file       Chief Complaint:   Chief Complaint   Patient presents with    Shoulder Pain     B shoulder       History: 57 year-old male with a long history of left shoulder problems.  Patient seen in consultation for Dr. Gibbs.Patient had prior soap and shoulder surgery about 20 years ago.  He has been experiencing 3 months of more increasing pain.  Patient is left-hand dominant.  Patient cannot raise his arm up above his head.  Has significant night pain.  Symptoms are stable and moderate to severe.  No recent treatment.  Prior surgery noted.      History of present illness: This is a patient of mine returns with bilateral shoulder pain again.  We injected his left shoulder back in August with great success.  He got about 7 months relief.  Also having similar findings and the right shoulder today.  Pain as a 5 out of 10.  No new injury or trauma.            Physical Examination:    Vital Signs:    Vitals:    04/26/18 1412   BP: (!) 144/80   Pulse: 86       Body mass index is 29.56 kg/m².    This a well-developed, well nourished patient in no acute distress.  They are alert and oriented and cooperative to examination.  Pt. walks without an antalgic gait.      Examination of the left shoulder shows no rashes or erythema. There are  no masses, ecchymosis, or atrophy.  Well-healed anterior scar.  The patient has some limitation range of motion in forward flexion, external rotation, and internal rotation to the mid T-spine. The patient has markedly positive impingement signs. - [place]'s test. - Speeds test. Nontender to palpation over a.c. joint. Normal stability anteriorly, posteriorly, and negative sulcus sign. Passive range of motion: Forward flexion of 180°, external rotation at 90° of 90°, internal rotation of 50°, and external rotation at 0° of 50°. 2+ radial pulse. Intact axillary, radial, median and ulnar sensation. 4 out of 5 resisted forward flexion, external rotation, and negative lift off test.    Examination of the right shoulder shows no rashes or erythema. There are no masses, ecchymosis, or atrophy. The patient has full range of motion in forward flexion, external rotation, and internal rotation to the mid T-spine. The patient has moderately positive impingement signs. - [place]'s test. - Speeds test. Nontender to palpation over a.c. joint. Normal stability anteriorly, posteriorly, and negative sulcus sign. Passive range of motion: Forward flexion of 180°, external rotation at 90° of 90°, internal rotation of 50°, and external rotation at 0° of 50°. 2+ radial pulse. Intact axillary, radial, median and ulnar sensation. 5 out of 5 resisted forward flexion, external rotation, and negative lift off test.    X-rays: X-rays of the left shoulder reviewed which show subacromial spur formation.  There is also hypertrophy of the acromioclavicular joint.  There is some fragmentation noted from possible prior surgeries.  X-rays of the right shoulder ordered and review which show some hypertrophy of the acromioclavicular joint.  Old rib fractures noted.     Assessment: Bilateral rotator cuff syndrome    Plan: We reviewed the findings today.  Patient wanted injections of both shoulders since he did so well with them the first time.  Patient  will follow up as needed.     This note was created using Dragon voice recognition software that occasionally misinterpreted phrases or words.    Consult note is delivered via Epic messaging service.    Answers for HPI/ROS submitted by the patient on 8/14/2017   Arm pain  unexpected weight change: No  appetite change : No  sleep disturbance: No  IMMUNOCOMPROMISED: No  nervous/ anxious: No  dysphoric mood: No  rash: No  visual disturbance: No  eye redness: No  eye pain: No  ear pain: No  tinnitus: No  hearing loss: No  sinus pressure : No  nosebleeds: No  enviro allergies: No  food allergies: No  cough: No  shortness of breath: No  sweating: No  frequency: Yes  difficulty urinating: No  hematuria: No  chest pain: No  palpitations: No  nausea: Yes  vomiting: Yes  diarrhea: No  blood in stool: No  constipation: No  headaches: No  dizziness: No  numbness: No  seizures: No  joint swelling: No  myalgia: Yes  back pain: Yes  Pain Chronicity: recurrent  History of trauma: Yes  Onset: more than 1 month ago  Frequency: constantly  Progression since onset: unchanged  injury location: at home  pain- numeric: 6/10  pain location: left shoulder  pain quality: sharp  Radiating Pain: Yes  If your pain is radiating, to what part of the body?: left arm  Aggravating factors: activity  fever: No  inability to bear weight: Yes  itching: No  joint locking: No  limited range of motion: Yes  stiffness: No  tingling: No  Treatments tried: heat  physical therapy: not tried  Improvement on treatment: mild

## 2018-05-14 DIAGNOSIS — E11.59 HYPERTENSION ASSOCIATED WITH DIABETES: ICD-10-CM

## 2018-05-14 DIAGNOSIS — I15.2 HYPERTENSION ASSOCIATED WITH DIABETES: ICD-10-CM

## 2018-05-14 RX ORDER — LISINOPRIL 40 MG/1
TABLET ORAL
Qty: 30 TABLET | Refills: 0 | Status: SHIPPED | OUTPATIENT
Start: 2018-05-14 | End: 2018-07-02 | Stop reason: SDUPTHER

## 2018-05-17 ENCOUNTER — TELEPHONE (OUTPATIENT)
Dept: FAMILY MEDICINE | Facility: CLINIC | Age: 58
End: 2018-05-17

## 2018-05-17 NOTE — TELEPHONE ENCOUNTER
----- Message from Adore Bryan sent at 5/17/2018  4:03 PM CDT -----  Contact: patient  Type: Needs Medical Advice    Who Called:  Patient  Symptoms (please be specific):  BEN  How long has patient had these symptoms:  BEN  Pharmacy name and phone #: BEN  Best Call Back Number:   Additional Information: He is calling to request his lab orders to be on paper and for it to be mailed to him. Please advise.

## 2018-05-23 ENCOUNTER — PATIENT MESSAGE (OUTPATIENT)
Dept: FAMILY MEDICINE | Facility: CLINIC | Age: 58
End: 2018-05-23

## 2018-06-19 DIAGNOSIS — E11.59 HYPERTENSION ASSOCIATED WITH DIABETES: ICD-10-CM

## 2018-06-19 DIAGNOSIS — I15.2 HYPERTENSION ASSOCIATED WITH DIABETES: ICD-10-CM

## 2018-06-19 RX ORDER — LISINOPRIL 40 MG/1
TABLET ORAL
Qty: 30 TABLET | Refills: 0 | OUTPATIENT
Start: 2018-06-19

## 2018-06-26 RX ORDER — METFORMIN HYDROCHLORIDE 1000 MG/1
TABLET ORAL
Qty: 180 TABLET | Refills: 2 | OUTPATIENT
Start: 2018-06-26

## 2018-06-28 LAB
ALBUMIN SERPL-MCNC: 4.5 G/DL (ref 3.6–5.1)
ALBUMIN/CREAT UR: 40 MCG/MG CREAT
ALBUMIN/GLOB SERPL: 1.8 (CALC) (ref 1–2.5)
ALP SERPL-CCNC: 60 U/L (ref 40–115)
ALT SERPL-CCNC: 15 U/L (ref 9–46)
AST SERPL-CCNC: 12 U/L (ref 10–35)
BASOPHILS # BLD AUTO: 39 CELLS/UL (ref 0–200)
BASOPHILS NFR BLD AUTO: 0.7 %
BILIRUB SERPL-MCNC: 0.4 MG/DL (ref 0.2–1.2)
BUN SERPL-MCNC: 19 MG/DL (ref 7–25)
BUN/CREAT SERPL: ABNORMAL (CALC) (ref 6–22)
CALCIUM SERPL-MCNC: 10.1 MG/DL (ref 8.6–10.3)
CHLORIDE SERPL-SCNC: 100 MMOL/L (ref 98–110)
CHOLEST SERPL-MCNC: 180 MG/DL
CHOLEST/HDLC SERPL: 3.4 (CALC)
CO2 SERPL-SCNC: 29 MMOL/L (ref 20–31)
CREAT SERPL-MCNC: 0.8 MG/DL (ref 0.7–1.33)
CREAT UR-MCNC: 262 MG/DL (ref 20–370)
EOSINOPHIL # BLD AUTO: 143 CELLS/UL (ref 15–500)
EOSINOPHIL NFR BLD AUTO: 2.6 %
ERYTHROCYTE [DISTWIDTH] IN BLOOD BY AUTOMATED COUNT: 14.2 % (ref 11–15)
GFR SERPL CREATININE-BSD FRML MDRD: 101 ML/MIN/1.73M2
GLOBULIN SER CALC-MCNC: 2.5 G/DL (CALC) (ref 1.9–3.7)
GLUCOSE SERPL-MCNC: 154 MG/DL (ref 65–99)
HBA1C MFR BLD: 10.7 % OF TOTAL HGB
HCT VFR BLD AUTO: 42.2 % (ref 38.5–50)
HCV AB S/CO SERPL IA: 0.03
HCV AB SERPL QL IA: NORMAL
HDLC SERPL-MCNC: 53 MG/DL
HGB BLD-MCNC: 14 G/DL (ref 13.2–17.1)
LDLC SERPL CALC-MCNC: 109 MG/DL (CALC)
LYMPHOCYTES # BLD AUTO: 1634 CELLS/UL (ref 850–3900)
LYMPHOCYTES NFR BLD AUTO: 29.7 %
MCH RBC QN AUTO: 28.3 PG (ref 27–33)
MCHC RBC AUTO-ENTMCNC: 33.2 G/DL (ref 32–36)
MCV RBC AUTO: 85.4 FL (ref 80–100)
MICROALBUMIN UR-MCNC: 10.5 MG/DL
MONOCYTES # BLD AUTO: 583 CELLS/UL (ref 200–950)
MONOCYTES NFR BLD AUTO: 10.6 %
NEUTROPHILS # BLD AUTO: 3102 CELLS/UL (ref 1500–7800)
NEUTROPHILS NFR BLD AUTO: 56.4 %
NONHDLC SERPL-MCNC: 127 MG/DL (CALC)
PLATELET # BLD AUTO: 260 THOUSAND/UL (ref 140–400)
PMV BLD REES-ECKER: 9.9 FL (ref 7.5–12.5)
POTASSIUM SERPL-SCNC: 4.3 MMOL/L (ref 3.5–5.3)
PROT SERPL-MCNC: 7 G/DL (ref 6.1–8.1)
RBC # BLD AUTO: 4.94 MILLION/UL (ref 4.2–5.8)
SODIUM SERPL-SCNC: 140 MMOL/L (ref 135–146)
TRIGL SERPL-MCNC: 90 MG/DL
WBC # BLD AUTO: 5.5 THOUSAND/UL (ref 3.8–10.8)

## 2018-06-29 ENCOUNTER — TELEPHONE (OUTPATIENT)
Dept: FAMILY MEDICINE | Facility: CLINIC | Age: 58
End: 2018-06-29

## 2018-07-02 ENCOUNTER — PATIENT MESSAGE (OUTPATIENT)
Dept: FAMILY MEDICINE | Facility: CLINIC | Age: 58
End: 2018-07-02

## 2018-07-02 DIAGNOSIS — I10 ESSENTIAL HYPERTENSION: Primary | ICD-10-CM

## 2018-07-02 DIAGNOSIS — I15.2 HYPERTENSION ASSOCIATED WITH DIABETES: ICD-10-CM

## 2018-07-02 DIAGNOSIS — E11.59 HYPERTENSION ASSOCIATED WITH DIABETES: ICD-10-CM

## 2018-07-02 RX ORDER — LISINOPRIL 40 MG/1
40 TABLET ORAL DAILY
Qty: 30 TABLET | Refills: 0 | Status: SHIPPED | OUTPATIENT
Start: 2018-07-02

## 2018-07-06 DIAGNOSIS — Z12.11 COLON CANCER SCREENING: ICD-10-CM

## 2018-07-06 DIAGNOSIS — E11.8 TYPE 2 DIABETES MELLITUS WITH COMPLICATION, WITHOUT LONG-TERM CURRENT USE OF INSULIN: Primary | ICD-10-CM

## 2018-07-10 ENCOUNTER — DOCUMENTATION ONLY (OUTPATIENT)
Dept: FAMILY MEDICINE | Facility: CLINIC | Age: 58
End: 2018-07-10

## 2018-07-10 NOTE — PROGRESS NOTES
Health Maintenance Due   Topic Date Due    Eye Exam  10[birth year]18/1972    TETANUS VACCINE  10/18/1980    Pneumococcal PPSV23 (Medium Risk) (1) 10/18/1980    Colonoscopy  04/06/2013    Foot Exam  06/16/2018

## 2018-07-30 DIAGNOSIS — I15.2 HYPERTENSION ASSOCIATED WITH DIABETES: ICD-10-CM

## 2018-07-30 DIAGNOSIS — E11.59 HYPERTENSION ASSOCIATED WITH DIABETES: ICD-10-CM

## 2018-07-31 RX ORDER — LISINOPRIL 40 MG/1
TABLET ORAL
Qty: 30 TABLET | Refills: 0 | OUTPATIENT
Start: 2018-07-31

## 2018-12-12 RX ORDER — PEN NEEDLE, DIABETIC 31 GX5/16"
NEEDLE, DISPOSABLE MISCELLANEOUS
Refills: 1 | OUTPATIENT
Start: 2018-12-12

## 2018-12-12 RX ORDER — PEN NEEDLE, DIABETIC 29 G X1/2"
NEEDLE, DISPOSABLE MISCELLANEOUS
Refills: 0 | OUTPATIENT
Start: 2018-12-12

## 2020-05-05 ENCOUNTER — PATIENT MESSAGE (OUTPATIENT)
Dept: ADMINISTRATIVE | Facility: HOSPITAL | Age: 60
End: 2020-05-05

## 2020-10-21 NOTE — TELEPHONE ENCOUNTER
Pt has been dismissed from practice per  Dr. Celestine Lenz due to no show history. FM 10/21/2020. The Lantus is not covered by my insurance. The pharmacy said that Levemir or Tresiba is alternate for Lantus, can you check to see if Dr Gibbs wants to try one of them.   I am having really bad cramping from the Pravastatin and I have stopped it. My wife had the same problem with it and now is on Atorvastatin Calcium 40mg, can I try it?   Is there anything that he can give me for the foot pain and also would like to see if he can send me a prescription for Phenergan.   Please call my wife [phone] if you have any questions. thanks,

## 2023-01-12 NOTE — TELEPHONE ENCOUNTER
Orders faxed and patient scheduled for follow up    For information on Fall & Injury Prevention, visit: [email or web address] OR  [email or web address] OR  https://www.cdc.gov/steadi/patient.html

## 2025-01-23 ENCOUNTER — ANESTHESIA (OUTPATIENT)
Dept: ENDOSCOPY | Facility: HOSPITAL | Age: 65
End: 2025-01-23
Payer: COMMERCIAL

## 2025-01-23 ENCOUNTER — HOSPITAL ENCOUNTER (OUTPATIENT)
Facility: HOSPITAL | Age: 65
Discharge: OTHER INSTITUTION | End: 2025-01-23
Attending: EMERGENCY MEDICINE | Admitting: STUDENT IN AN ORGANIZED HEALTH CARE EDUCATION/TRAINING PROGRAM

## 2025-01-23 ENCOUNTER — HOSPITAL ENCOUNTER (INPATIENT)
Facility: HOSPITAL | Age: 65
LOS: 9 days | Discharge: HOME | DRG: 356 | End: 2025-02-01
Attending: STUDENT IN AN ORGANIZED HEALTH CARE EDUCATION/TRAINING PROGRAM | Admitting: INTERNAL MEDICINE
Payer: COMMERCIAL

## 2025-01-23 ENCOUNTER — ANESTHESIA EVENT (OUTPATIENT)
Dept: ENDOSCOPY | Facility: HOSPITAL | Age: 65
End: 2025-01-23
Payer: COMMERCIAL

## 2025-01-23 VITALS
HEIGHT: 70 IN | WEIGHT: 218 LBS | OXYGEN SATURATION: 100 % | TEMPERATURE: 98 F | BODY MASS INDEX: 31.21 KG/M2 | DIASTOLIC BLOOD PRESSURE: 66 MMHG | HEART RATE: 86 BPM | RESPIRATION RATE: 20 BRPM | SYSTOLIC BLOOD PRESSURE: 109 MMHG

## 2025-01-23 DIAGNOSIS — R07.9 CHEST PAIN: ICD-10-CM

## 2025-01-23 DIAGNOSIS — I10 ESSENTIAL HYPERTENSION: ICD-10-CM

## 2025-01-23 DIAGNOSIS — N17.9 AKI (ACUTE KIDNEY INJURY): ICD-10-CM

## 2025-01-23 DIAGNOSIS — K68.3 RETROPERITONEAL HEMATOMA: ICD-10-CM

## 2025-01-23 DIAGNOSIS — I48.0 PAROXYSMAL ATRIAL FIBRILLATION: ICD-10-CM

## 2025-01-23 DIAGNOSIS — S37.019A RENAL HEMATOMA: ICD-10-CM

## 2025-01-23 DIAGNOSIS — R58 RETROPERITONEAL HEMORRHAGE: Primary | ICD-10-CM

## 2025-01-23 DIAGNOSIS — M54.9 BACK PAIN: ICD-10-CM

## 2025-01-23 LAB
ABO + RH BLD: NORMAL
ABO + RH BLD: NORMAL
ALBUMIN SERPL BCP-MCNC: 2 G/DL (ref 3.5–5.2)
ALP SERPL-CCNC: 112 U/L (ref 40–150)
ALT SERPL W/O P-5'-P-CCNC: 29 U/L (ref 10–44)
ANION GAP SERPL CALC-SCNC: 13 MMOL/L (ref 8–16)
AST SERPL-CCNC: 29 U/L (ref 10–40)
BACTERIA #/AREA URNS HPF: ABNORMAL /HPF
BASOPHILS # BLD AUTO: 0.04 K/UL (ref 0–0.2)
BASOPHILS NFR BLD: 0.3 % (ref 0–1.9)
BILIRUB SERPL-MCNC: 0.6 MG/DL (ref 0.1–1)
BILIRUB UR QL STRIP: NEGATIVE
BLD GP AB SCN CELLS X3 SERPL QL: NORMAL
BLD GP AB SCN CELLS X3 SERPL QL: NORMAL
BLD PROD TYP BPU: NORMAL
BLOOD UNIT EXPIRATION DATE: NORMAL
BLOOD UNIT TYPE CODE: 6200
BLOOD UNIT TYPE: NORMAL
BUN SERPL-MCNC: 25 MG/DL (ref 8–23)
CALCIUM SERPL-MCNC: 8.1 MG/DL (ref 8.7–10.5)
CHLORIDE SERPL-SCNC: 106 MMOL/L (ref 95–110)
CLARITY UR: ABNORMAL
CO2 SERPL-SCNC: 13 MMOL/L (ref 23–29)
CODING SYSTEM: NORMAL
COLOR UR: YELLOW
CREAT SERPL-MCNC: 1.4 MG/DL (ref 0.5–1.4)
CROSSMATCH INTERPRETATION: NORMAL
DIFFERENTIAL METHOD BLD: ABNORMAL
DISPENSE STATUS: NORMAL
EOSINOPHIL # BLD AUTO: 0.1 K/UL (ref 0–0.5)
EOSINOPHIL NFR BLD: 0.5 % (ref 0–8)
ERYTHROCYTE [DISTWIDTH] IN BLOOD BY AUTOMATED COUNT: 17.4 % (ref 11.5–14.5)
EST. GFR  (NO RACE VARIABLE): 57 ML/MIN/1.73 M^2
GLUCOSE SERPL-MCNC: 285 MG/DL (ref 70–110)
GLUCOSE UR QL STRIP: ABNORMAL
HCT VFR BLD AUTO: 30.4 % (ref 40–54)
HGB BLD-MCNC: 10 G/DL (ref 14–18)
HGB UR QL STRIP: ABNORMAL
HYALINE CASTS #/AREA URNS LPF: 0 /LPF
IMM GRANULOCYTES # BLD AUTO: 0.15 K/UL (ref 0–0.04)
IMM GRANULOCYTES NFR BLD AUTO: 1 % (ref 0–0.5)
KETONES UR QL STRIP: NEGATIVE
LACTATE SERPL-SCNC: 1.7 MMOL/L (ref 0.5–2.2)
LEUKOCYTE ESTERASE UR QL STRIP: ABNORMAL
LYMPHOCYTES # BLD AUTO: 0.8 K/UL (ref 1–4.8)
LYMPHOCYTES NFR BLD: 5.6 % (ref 18–48)
MAGNESIUM SERPL-MCNC: 2.1 MG/DL (ref 1.6–2.6)
MCH RBC QN AUTO: 27.4 PG (ref 27–31)
MCHC RBC AUTO-ENTMCNC: 32.9 G/DL (ref 32–36)
MCV RBC AUTO: 83 FL (ref 82–98)
MICROSCOPIC COMMENT: ABNORMAL
MONOCYTES # BLD AUTO: 0.6 K/UL (ref 0.3–1)
MONOCYTES NFR BLD: 4 % (ref 4–15)
NEUTROPHILS # BLD AUTO: 12.9 K/UL (ref 1.8–7.7)
NEUTROPHILS NFR BLD: 88.6 % (ref 38–73)
NITRITE UR QL STRIP: NEGATIVE
NRBC BLD-RTO: 0 /100 WBC
NUM UNITS TRANS PACKED RBC: NORMAL
PH UR STRIP: 6 [PH] (ref 5–8)
PLATELET # BLD AUTO: 400 K/UL (ref 150–450)
PMV BLD AUTO: 9.3 FL (ref 9.2–12.9)
POCT GLUCOSE: 297 MG/DL (ref 70–110)
POTASSIUM SERPL-SCNC: 4.9 MMOL/L (ref 3.5–5.1)
PROT SERPL-MCNC: 6.8 G/DL (ref 6–8.4)
PROT UR QL STRIP: ABNORMAL
RBC # BLD AUTO: 3.65 M/UL (ref 4.6–6.2)
RBC #/AREA URNS HPF: 6 /HPF (ref 0–4)
SODIUM SERPL-SCNC: 132 MMOL/L (ref 136–145)
SP GR UR STRIP: >1.03 (ref 1–1.03)
SPECIMEN OUTDATE: NORMAL
SPECIMEN OUTDATE: NORMAL
SQUAMOUS #/AREA URNS HPF: 2 /HPF
URN SPEC COLLECT METH UR: ABNORMAL
UROBILINOGEN UR STRIP-ACNC: ABNORMAL EU/DL
WBC # BLD AUTO: 14.58 K/UL (ref 3.9–12.7)
WBC #/AREA URNS HPF: 47 /HPF (ref 0–5)
YEAST URNS QL MICRO: ABNORMAL

## 2025-01-23 PROCEDURE — G0378 HOSPITAL OBSERVATION PER HR: HCPCS

## 2025-01-23 PROCEDURE — 86901 BLOOD TYPING SEROLOGIC RH(D): CPT | Mod: 91 | Performed by: STUDENT IN AN ORGANIZED HEALTH CARE EDUCATION/TRAINING PROGRAM

## 2025-01-23 PROCEDURE — 96375 TX/PRO/DX INJ NEW DRUG ADDON: CPT

## 2025-01-23 PROCEDURE — 83735 ASSAY OF MAGNESIUM: CPT | Performed by: NURSE PRACTITIONER

## 2025-01-23 PROCEDURE — 96374 THER/PROPH/DIAG INJ IV PUSH: CPT

## 2025-01-23 PROCEDURE — 86900 BLOOD TYPING SEROLOGIC ABO: CPT | Performed by: EMERGENCY MEDICINE

## 2025-01-23 PROCEDURE — 83605 ASSAY OF LACTIC ACID: CPT | Performed by: NURSE PRACTITIONER

## 2025-01-23 PROCEDURE — 36415 COLL VENOUS BLD VENIPUNCTURE: CPT | Performed by: NURSE PRACTITIONER

## 2025-01-23 PROCEDURE — 96376 TX/PRO/DX INJ SAME DRUG ADON: CPT

## 2025-01-23 PROCEDURE — P9016 RBC LEUKOCYTES REDUCED: HCPCS | Performed by: EMERGENCY MEDICINE

## 2025-01-23 PROCEDURE — B42F1ZZ COMPUTERIZED TOMOGRAPHY (CT SCAN) OF RIGHT LOWER EXTREMITY ARTERIES USING LOW OSMOLAR CONTRAST: ICD-10-PCS | Performed by: RADIOLOGY

## 2025-01-23 PROCEDURE — 87086 URINE CULTURE/COLONY COUNT: CPT | Performed by: NURSE PRACTITIONER

## 2025-01-23 PROCEDURE — 25000003 PHARM REV CODE 250: Performed by: NURSE ANESTHETIST, CERTIFIED REGISTERED

## 2025-01-23 PROCEDURE — 36415 COLL VENOUS BLD VENIPUNCTURE: CPT | Performed by: STUDENT IN AN ORGANIZED HEALTH CARE EDUCATION/TRAINING PROGRAM

## 2025-01-23 PROCEDURE — 63600175 PHARM REV CODE 636 W HCPCS: Performed by: RADIOLOGY

## 2025-01-23 PROCEDURE — 81000 URINALYSIS NONAUTO W/SCOPE: CPT | Performed by: NURSE PRACTITIONER

## 2025-01-23 PROCEDURE — 99285 EMERGENCY DEPT VISIT HI MDM: CPT | Mod: 25

## 2025-01-23 PROCEDURE — 25500020 PHARM REV CODE 255

## 2025-01-23 PROCEDURE — 12000002 HC ACUTE/MED SURGE SEMI-PRIVATE ROOM

## 2025-01-23 PROCEDURE — 37000008 HC ANESTHESIA 1ST 15 MINUTES

## 2025-01-23 PROCEDURE — 06L93DZ OCCLUSION OF RIGHT RENAL VEIN WITH INTRALUMINAL DEVICE, PERCUTANEOUS APPROACH: ICD-10-PCS | Performed by: RADIOLOGY

## 2025-01-23 PROCEDURE — D9220A PRA ANESTHESIA: Mod: CRNA,,, | Performed by: NURSE ANESTHETIST, CERTIFIED REGISTERED

## 2025-01-23 PROCEDURE — 87040 BLOOD CULTURE FOR BACTERIA: CPT | Mod: 59 | Performed by: NURSE PRACTITIONER

## 2025-01-23 PROCEDURE — 63600175 PHARM REV CODE 636 W HCPCS: Mod: JZ,TB | Performed by: NURSE PRACTITIONER

## 2025-01-23 PROCEDURE — 80053 COMPREHEN METABOLIC PANEL: CPT | Performed by: NURSE PRACTITIONER

## 2025-01-23 PROCEDURE — 37000009 HC ANESTHESIA EA ADD 15 MINS

## 2025-01-23 PROCEDURE — 82962 GLUCOSE BLOOD TEST: CPT

## 2025-01-23 PROCEDURE — 63600175 PHARM REV CODE 636 W HCPCS: Performed by: NURSE ANESTHETIST, CERTIFIED REGISTERED

## 2025-01-23 PROCEDURE — 86920 COMPATIBILITY TEST SPIN: CPT | Performed by: EMERGENCY MEDICINE

## 2025-01-23 PROCEDURE — 63600175 PHARM REV CODE 636 W HCPCS: Mod: JZ,TB | Performed by: EMERGENCY MEDICINE

## 2025-01-23 PROCEDURE — 85025 COMPLETE CBC W/AUTO DIFF WBC: CPT | Performed by: NURSE PRACTITIONER

## 2025-01-23 PROCEDURE — 20000000 HC ICU ROOM

## 2025-01-23 PROCEDURE — 36430 TRANSFUSION BLD/BLD COMPNT: CPT

## 2025-01-23 PROCEDURE — D9220A PRA ANESTHESIA: Mod: ANES,,, | Performed by: ANESTHESIOLOGY

## 2025-01-23 RX ORDER — HEPARIN SOD,PORCINE/0.9 % NACL 1000/500ML
INTRAVENOUS SOLUTION INTRAVENOUS
Status: COMPLETED | OUTPATIENT
Start: 2025-01-23 | End: 2025-01-23

## 2025-01-23 RX ORDER — BUPRENORPHINE AND NALOXONE 8; 2 MG/1; MG/1
3 FILM, SOLUBLE BUCCAL; SUBLINGUAL DAILY
Status: ON HOLD | COMMUNITY
Start: 2024-12-18 | End: 2025-02-01 | Stop reason: HOSPADM

## 2025-01-23 RX ORDER — SUCCINYLCHOLINE CHLORIDE 20 MG/ML
INJECTION INTRAMUSCULAR; INTRAVENOUS
Status: DISCONTINUED | OUTPATIENT
Start: 2025-01-23 | End: 2025-01-24

## 2025-01-23 RX ORDER — FENTANYL CITRATE 50 UG/ML
50 INJECTION, SOLUTION INTRAMUSCULAR; INTRAVENOUS
Status: DISCONTINUED | OUTPATIENT
Start: 2025-01-24 | End: 2025-01-24

## 2025-01-23 RX ORDER — LIDOCAINE HYDROCHLORIDE 20 MG/ML
INJECTION, SOLUTION EPIDURAL; INFILTRATION; INTRACAUDAL; PERINEURAL
Status: DISCONTINUED | OUTPATIENT
Start: 2025-01-23 | End: 2025-01-24

## 2025-01-23 RX ORDER — HALOPERIDOL 5 MG/ML
0.5 INJECTION INTRAMUSCULAR EVERY 10 MIN PRN
Status: CANCELLED | OUTPATIENT
Start: 2025-01-23

## 2025-01-23 RX ORDER — SODIUM CHLORIDE 0.9 % (FLUSH) 0.9 %
10 SYRINGE (ML) INJECTION
Status: DISCONTINUED | OUTPATIENT
Start: 2025-01-24 | End: 2025-02-01 | Stop reason: HOSPADM

## 2025-01-23 RX ORDER — APIXABAN 5 MG/1
5 TABLET, FILM COATED ORAL 2 TIMES DAILY
Status: ON HOLD | COMMUNITY
Start: 2024-12-23 | End: 2025-01-31 | Stop reason: HOSPADM

## 2025-01-23 RX ORDER — ROCURONIUM BROMIDE 10 MG/ML
INJECTION, SOLUTION INTRAVENOUS
Status: DISCONTINUED | OUTPATIENT
Start: 2025-01-23 | End: 2025-01-24

## 2025-01-23 RX ORDER — NALOXONE HCL 0.4 MG/ML
0.02 VIAL (ML) INJECTION
Status: DISCONTINUED | OUTPATIENT
Start: 2025-01-24 | End: 2025-02-01 | Stop reason: HOSPADM

## 2025-01-23 RX ORDER — HYDROCODONE BITARTRATE AND ACETAMINOPHEN 500; 5 MG/1; MG/1
TABLET ORAL
Status: DISCONTINUED | OUTPATIENT
Start: 2025-01-23 | End: 2025-01-23 | Stop reason: HOSPADM

## 2025-01-23 RX ORDER — ONDANSETRON HYDROCHLORIDE 2 MG/ML
4 INJECTION, SOLUTION INTRAVENOUS
Status: COMPLETED | OUTPATIENT
Start: 2025-01-23 | End: 2025-01-23

## 2025-01-23 RX ORDER — OXYCODONE AND ACETAMINOPHEN 5; 325 MG/1; MG/1
1 TABLET ORAL
Status: CANCELLED | OUTPATIENT
Start: 2025-01-23

## 2025-01-23 RX ORDER — FENTANYL CITRATE 50 UG/ML
INJECTION, SOLUTION INTRAMUSCULAR; INTRAVENOUS
Status: DISCONTINUED | OUTPATIENT
Start: 2025-01-23 | End: 2025-01-24

## 2025-01-23 RX ORDER — CEFTRIAXONE 1 G/1
1 INJECTION, POWDER, FOR SOLUTION INTRAMUSCULAR; INTRAVENOUS
Status: COMPLETED | OUTPATIENT
Start: 2025-01-23 | End: 2025-01-23

## 2025-01-23 RX ORDER — HYDROMORPHONE HYDROCHLORIDE 1 MG/ML
1 INJECTION, SOLUTION INTRAMUSCULAR; INTRAVENOUS; SUBCUTANEOUS
Status: COMPLETED | OUTPATIENT
Start: 2025-01-23 | End: 2025-01-23

## 2025-01-23 RX ORDER — METOPROLOL SUCCINATE 25 MG/1
25 TABLET, EXTENDED RELEASE ORAL DAILY
COMMUNITY
Start: 2024-07-15 | End: 2025-07-15

## 2025-01-23 RX ORDER — DEXMEDETOMIDINE HYDROCHLORIDE 100 UG/ML
INJECTION, SOLUTION INTRAVENOUS
Status: DISCONTINUED | OUTPATIENT
Start: 2025-01-23 | End: 2025-01-24

## 2025-01-23 RX ORDER — IBUPROFEN 200 MG
24 TABLET ORAL
Status: DISCONTINUED | OUTPATIENT
Start: 2025-01-24 | End: 2025-01-29

## 2025-01-23 RX ORDER — HYDROMORPHONE HYDROCHLORIDE 1 MG/ML
0.2 INJECTION, SOLUTION INTRAMUSCULAR; INTRAVENOUS; SUBCUTANEOUS EVERY 10 MIN PRN
Status: CANCELLED | OUTPATIENT
Start: 2025-01-24

## 2025-01-23 RX ORDER — PROPOFOL 10 MG/ML
VIAL (ML) INTRAVENOUS
Status: DISCONTINUED | OUTPATIENT
Start: 2025-01-23 | End: 2025-01-24

## 2025-01-23 RX ORDER — IBUPROFEN 200 MG
16 TABLET ORAL
Status: DISCONTINUED | OUTPATIENT
Start: 2025-01-24 | End: 2025-01-29

## 2025-01-23 RX ORDER — ONDANSETRON 4 MG/1
4 TABLET, ORALLY DISINTEGRATING ORAL EVERY 6 HOURS PRN
COMMUNITY
Start: 2025-01-15

## 2025-01-23 RX ORDER — GLUCAGON 1 MG
1 KIT INJECTION
Status: DISCONTINUED | OUTPATIENT
Start: 2025-01-24 | End: 2025-01-24

## 2025-01-23 RX ORDER — GLUCAGON 1 MG
1 KIT INJECTION
Status: CANCELLED | OUTPATIENT
Start: 2025-01-24

## 2025-01-23 RX ORDER — TRAMADOL HYDROCHLORIDE 50 MG/1
1 TABLET ORAL EVERY 6 HOURS PRN
Status: ON HOLD | COMMUNITY
Start: 2025-01-14 | End: 2025-02-01 | Stop reason: HOSPADM

## 2025-01-23 RX ORDER — FLECAINIDE ACETATE 50 MG/1
50 TABLET ORAL 2 TIMES DAILY
Status: ON HOLD | COMMUNITY
Start: 2024-12-16 | End: 2025-02-01 | Stop reason: HOSPADM

## 2025-01-23 RX ADMIN — HYDROMORPHONE HYDROCHLORIDE 1 MG: 1 INJECTION, SOLUTION INTRAMUSCULAR; INTRAVENOUS; SUBCUTANEOUS at 05:01

## 2025-01-23 RX ADMIN — IOHEXOL 75 ML: 300 INJECTION, SOLUTION INTRAVENOUS at 11:01

## 2025-01-23 RX ADMIN — CEFTRIAXONE SODIUM 1 G: 1 INJECTION, POWDER, FOR SOLUTION INTRAMUSCULAR; INTRAVENOUS at 05:01

## 2025-01-23 RX ADMIN — PROPOFOL 140 MG: 10 INJECTION, EMULSION INTRAVENOUS at 09:01

## 2025-01-23 RX ADMIN — FENTANYL CITRATE 25 MCG: 50 INJECTION INTRAMUSCULAR; INTRAVENOUS at 10:01

## 2025-01-23 RX ADMIN — ROCURONIUM BROMIDE 5 MG: 10 INJECTION, SOLUTION INTRAVENOUS at 10:01

## 2025-01-23 RX ADMIN — DEXMEDETOMIDINE 8 MCG: 200 INJECTION, SOLUTION INTRAVENOUS at 11:01

## 2025-01-23 RX ADMIN — PROPOFOL 20 MG: 10 INJECTION, EMULSION INTRAVENOUS at 11:01

## 2025-01-23 RX ADMIN — IOHEXOL 100 ML: 350 INJECTION, SOLUTION INTRAVENOUS at 05:01

## 2025-01-23 RX ADMIN — ROCURONIUM BROMIDE 40 MG: 10 INJECTION, SOLUTION INTRAVENOUS at 09:01

## 2025-01-23 RX ADMIN — SUCCINYLCHOLINE CHLORIDE 120 MG: 20 INJECTION, SOLUTION INTRAMUSCULAR; INTRAVENOUS; PARENTERAL at 09:01

## 2025-01-23 RX ADMIN — HEPARIN SODIUM 1500 ML: 200 INJECTION, SOLUTION INTRAVENOUS at 10:01

## 2025-01-23 RX ADMIN — IOHEXOL 75 ML: 350 INJECTION, SOLUTION INTRAVENOUS at 07:01

## 2025-01-23 RX ADMIN — SODIUM CHLORIDE: 0.9 INJECTION, SOLUTION INTRAVENOUS at 09:01

## 2025-01-23 RX ADMIN — DEXMEDETOMIDINE 12 MCG: 200 INJECTION, SOLUTION INTRAVENOUS at 11:01

## 2025-01-23 RX ADMIN — HYDROMORPHONE HYDROCHLORIDE 1 MG: 1 INJECTION, SOLUTION INTRAMUSCULAR; INTRAVENOUS; SUBCUTANEOUS at 08:01

## 2025-01-23 RX ADMIN — LIDOCAINE HYDROCHLORIDE 100 MG: 20 INJECTION, SOLUTION EPIDURAL; INFILTRATION; INTRACAUDAL at 09:01

## 2025-01-23 RX ADMIN — ONDANSETRON 4 MG: 2 INJECTION INTRAMUSCULAR; INTRAVENOUS at 05:01

## 2025-01-23 RX ADMIN — FENTANYL CITRATE 25 MCG: 50 INJECTION INTRAMUSCULAR; INTRAVENOUS at 11:01

## 2025-01-23 RX ADMIN — HYDROMORPHONE HYDROCHLORIDE 1 MG: 1 INJECTION, SOLUTION INTRAMUSCULAR; INTRAVENOUS; SUBCUTANEOUS at 07:01

## 2025-01-23 RX ADMIN — SUGAMMADEX 200 MG: 100 INJECTION, SOLUTION INTRAVENOUS at 11:01

## 2025-01-23 NOTE — ED PROVIDER NOTES
Encounter Date: 1/23/2025       History     Chief Complaint   Patient presents with    Flank Pain     Was discharge from [place] today. Pt is still having flank pain. Was admitted for a kidney infection and a kidney stone.     Abdominal Pain     States pain wraps around the front as welll.      64-year-old male presents emergency department for evaluation of right flank pain.  The patient states that he had a renal stone approximately 1 week ago and was seen at [facility] in [facility].  He was later admitted to [facility] for a renal hematoma and persistent flank pain.  He received a blood transfusion while at [place] and was released this morning.  Patient states his pain immediately returned after being released.  States this is the same pain he has been having for the past week.  It is in the right lower back radiating around the right flank and into the right testicle.  It is constant, stabbing, severe in nature.  Nothing makes it better or worse.  He took no medications prior to arrival treat pain.  Denies any symptoms on review of systems at this time.  Patient has allergies to Ativan and new vein.  Medical history includes diabetes, GERD, hyperlipidemia, hypertension.    The history is provided by the patient and the spouse. No  was used.     Review of patient's allergies indicates:   Allergen Reactions    Nubain [nalbuphine] Anaphylaxis    Ativan [lorazepam] Other (See Comments)     Climbs wall       Past Medical History:   Diagnosis Date    Diabetes mellitus     GERD (gastroesophageal reflux disease)     Hip discomfort     Hip fracture     Hyperlipidemia     Hypertension     Pancreas disorder     Spine pain      Past Surgical History:   Procedure Laterality Date    ANGIOGRAM, RENAL, BILATERAL, SELECTIVE, WITH S&I Bilateral 1/23/2025    Procedure: ANGIOGRAM, RENAL, BILATERAL, SELECTIVE, WITH S&I;  Surgeon: Celeste Win;  Location: [facility];  Service: Anesthesiology;   Laterality: Bilateral;    FRACTURE SURGERY       No family history on file.  Social History     Tobacco Use    Smoking status: Never    Smokeless tobacco: Current     Types: Chew   Substance Use Topics    Alcohol use: No    Drug use: No     Review of Systems   Constitutional:  Negative for chills and fever.   HENT:  Negative for rhinorrhea and sore throat.    Respiratory:  Negative for cough, shortness of breath and wheezing.    Cardiovascular:  Negative for chest pain.   Gastrointestinal:  Positive for abdominal pain. Negative for diarrhea, nausea and vomiting.   Genitourinary:  Positive for flank pain. Negative for difficulty urinating.   Musculoskeletal:  Negative for back pain and neck pain.   Skin:  Negative for rash.   Neurological:  Negative for dizziness, weakness and headaches.   All other systems reviewed and are negative.      Physical Exam     Initial Vitals   BP Pulse Resp Temp SpO2   01/23/25 1610 01/23/25 1608 01/23/25 1608 01/23/25 1608 01/23/25 1608   (!) 191/109 84 18 98.4 °F (36.9 °C) 98 %      MAP       --                Physical Exam    Nursing note and vitals reviewed.  Constitutional: He appears well-developed and well-nourished. He is not diaphoretic. No distress.   HENT:   Head: Normocephalic and atraumatic.   Right Ear: External ear normal.   Left Ear: External ear normal.   Nose: Nose normal.   Eyes: Conjunctivae and EOM are normal. Right eye exhibits no discharge. Left eye exhibits no discharge.   Neck: Neck supple.   Normal range of motion.  Cardiovascular:  Normal rate, regular rhythm, normal heart sounds and intact distal pulses.           Pulmonary/Chest: Breath sounds normal. No respiratory distress.   Abdominal: Abdomen is soft. Bowel sounds are normal. He exhibits no distension. There is abdominal tenderness in the right upper quadrant, right lower quadrant and left upper quadrant.   There is right CVA tenderness.  Musculoskeletal:         General: No tenderness or edema. Normal  range of motion.      Cervical back: Normal range of motion and neck supple.     Neurological: He is alert and oriented to person, place, and time. He has normal strength. No sensory deficit. GCS score is 15. GCS eye subscore is 4. GCS verbal subscore is 5. GCS motor subscore is 6.   Skin: Skin is warm and dry. No rash noted.   Psychiatric: He has a normal mood and affect. Thought content normal.         ED Course   Procedures  Labs Reviewed   CBC W/ AUTO DIFFERENTIAL - Abnormal       Result Value    WBC 14.58 (*)     RBC 3.65 (*)     Hemoglobin 10.0 (*)     Hematocrit 30.4 (*)     MCV 83      MCH 27.4      MCHC 32.9      RDW 17.4 (*)     Platelets 400      MPV 9.3      Immature Granulocytes 1.0 (*)     Gran # (ANC) 12.9 (*)     Immature Grans (Abs) 0.15 (*)     Lymph # 0.8 (*)     Mono # 0.6      Eos # 0.1      Baso # 0.04      nRBC 0      Gran % 88.6 (*)     Lymph % 5.6 (*)     Mono % 4.0      Eosinophil % 0.5      Basophil % 0.3      Differential Method Automated     COMPREHENSIVE METABOLIC PANEL - Abnormal    Sodium 132 (*)     Potassium 4.9      Chloride 106      CO2 13 (*)     Glucose 285 (*)     BUN 25 (*)     Creatinine 1.4      Calcium 8.1 (*)     Total Protein 6.8      Albumin 2.0 (*)     Total Bilirubin 0.6      Alkaline Phosphatase 112      AST 29      ALT 29      eGFR 57 (*)     Anion Gap 13     URINALYSIS, REFLEX TO URINE CULTURE - Abnormal    Specimen UA Urine, Clean Catch      Color, UA Yellow      Appearance, UA Hazy (*)     pH, UA 6.0      Specific Gravity, UA >1.030 (*)     Protein, UA 2+ (*)     Glucose, UA 4+ (*)     Ketones, UA Negative      Bilirubin (UA) Negative      Occult Blood UA 3+ (*)     Nitrite, UA Negative      Urobilinogen, UA 2.0-3.0 (*)     Leukocytes, UA Trace (*)     Narrative:     Specimen Source->Urine   URINALYSIS MICROSCOPIC - Abnormal    RBC, UA 6 (*)     WBC, UA 47 (*)     Bacteria Few (*)     Yeast, UA None      Squam Epithel, UA 2      Hyaline Casts, UA 0       Microscopic Comment SEE COMMENT      Narrative:     Specimen Source->Urine   POCT GLUCOSE - Abnormal    POCT Glucose 297 (*)    CULTURE, URINE   CULTURE, BLOOD   CULTURE, BLOOD   MAGNESIUM    Magnesium 2.1     LACTIC ACID, PLASMA    Lactate (Lactic Acid) 1.7     TYPE & SCREEN    Group & Rh A POS      Indirect Aliya NEG      Specimen Outdate 01/26/2025 23:59     PREPARE RBC SOFT    UNIT NUMBER [id number]      Product Code H8529D20      DISPENSE STATUS TRANSFUSED      CODING SYSTEM WSUB023      Unit Blood Type Code 6200      Unit Blood Type A POS      Unit Expiration [id number]      CROSSMATCH INTERPRETATION Compatible            Imaging Results              CTA Acute GI Bleed, Abdomen and Pelvis (Final result)  Result time 01/23/25 19:57:21      Final result by Bj Up DO (01/23/25 19:57:21)                   Impression:      Redemonstration of right perinephric and retroperitoneal hematomas, with active contrast extravasation as above.      Electronically signed by: Bj Up  Date:    01/23/2025  Time:    19:57               Narrative:    EXAMINATION:  CTA ACUTE GI BLEED, ABDOMEN AND PELVIS    CLINICAL HISTORY:  renal hematoma;    TECHNIQUE:  Multiplanar images were obtained of the abdomen and pelvis from the hemidiaphragms through the symphysis pubis before and after the administration of intravenous contrast in the arterial phase and the portal venous phase.  Contrast dose: 75 Omnipaque 350.    COMPARISON:  CT of the abdomen and pelvis from 01/23/2025.    FINDINGS:  Lung Bases: There are small bilateral pleural effusions.  The pleural spaces are clear.    Distal esophagus: Normal.    Heart: Heart size is normal. No pericardial effusion.    Liver: Normal.    Biliary tract: No intrahepatic or extrahepatic biliary ductal dilatation.    Gallbladder: No radiodense gallstone. No wall thickening or pericholecystic fluid.    Pancreas: Normal. No pancreatic ductal dilatation.    Spleen:  Normal.    Adrenals: Normal.    Kidneys and urinary collecting systems: Again seen is a large right perinephric hematoma and a large retroperitoneal hematoma with a focus of contrast extravasation which appears to be arising from or near the inferior pole of the right kidney (series 4, image 377).  The hematomas appear similar in size to the prior study from earlier today.  Again seen are multiple hypodensities within the right kidney, compatible with renal lacerations or infarcts.    Lymph nodes: None enlarged.    Stomach and bowel: Stomach is normal appearance. Visualized loops of small and large bowel are normal in caliber without evidence for inflammation or obstruction. No evidence of intraluminal contrast extravasation identified allowing for significant motion on arterial phase images as well as differences in positioning of the bowel on all phases. No contrast pooling is identified within bowel on the delayed phase images.    Peritoneum and mesentery: Right perinephric and retroperitoneal hematomas as above.  Scattered mild volume intraperitoneal hemorrhage is noted elsewhere in the abdomen and pelvis.    Vasculature: There is moderate atherosclerosis.    Urinary bladder: There is contrast in the urinary bladder.  There is bladder wall thickening.    Reproductive organs: The prostate is not enlarged.    Body wall: There is mild body wall anasarca.  There is a fat containing left inguinal hernia.    Musculoskeletal: No aggressive osseous lesion.  There are postoperative changes of the left sacroiliac joints.  There are remote fractures of the pelvis.  There are degenerative changes of the spine                                        CT Abdomen Pelvis With IV Contrast NO Oral Contrast (Final result)  Result time 01/23/25 17:58:45      Final result by Bj Up DO (01/23/25 17:58:45)                   Impression:      1. Large right perinephric and midline retroperitoneal hematomas with active contrast  extravasation.  2. Several areas of cortical hypoattenuation of the right kidney, suspicious for low-grade renal lacerations or infarcts.  3. Small bilateral pleural effusions.  4. Urinary bladder wall thickening.  Correlate with urinalysis to exclude cystitis.  This report was flagged in Epic as abnormal.    Dr. Up discussed critical findings with ROSALBA Gonzalez by telephone at 17:47 on 01/23/2025.      Electronically signed by: Bj Up  Date:    01/23/2025  Time:    17:58               Narrative:    EXAMINATION:  CT ABDOMEN PELVIS WITH IV CONTRAST    CLINICAL HISTORY:  Flank pain, kidney stone suspected;    TECHNIQUE:  Axial CT images with sagittal and coronal reformats were obtained of the abdomen and pelvis from the hemidiaphragms through the symphysis pubis after the administration of 100mL Omnipaque 350.    COMPARISON:  Abdominal ultrasound from 09/17/2013.    FINDINGS:  Lung Bases: There are small bilateral pleural effusions.    Heart: Heart size is normal.  No pericardial effusion.    Liver: The liver is normal in size and demonstrates homogeneous enhancement without focal lesion.  The portal vasculature is patent.    Biliary tract: No intrahepatic or extrahepatic biliary ductal dilatation.    Gallbladder: No radiodense gallstone. No wall thickening or pericholecystic fluid.    Pancreas: The tail of the pancreas is not well seen, may be atrophic or absent.  No pancreatic ductal dilatation.    Spleen: Normal size without focal lesion.    Adrenals: Unremarkable.    Kidneys and urinary collecting systems: There is a large right perinephric hematoma which extends into the midline retroperitoneum where there is a large area of contrast extravasation.  There are several areas of hypoattenuation in the right renal cortex, compatible with renal lacerations or infarcts, this does not involve the renal hilum or the ureters.  There is contrast within the proximal portion of the right renal collecting system,  without definite evidence of contrast extravasation from the ureter.  No hydronephrosis or urolithiasis.    Lymph nodes: None enlarged.    Stomach and bowel: The stomach is normal.  Loops of small and large bowel are normal in caliber without evidence for inflammation or obstruction.  The appendix is normal.    Peritoneum and mesentery: There is trace layering hemorrhage in the pelvis.  Large retroperitoneal hematoma as detailed above.    Vasculature: No aneurysm or significant atherosclerosis.    Urinary bladder: There is urinary bladder wall thickening.  There is contrast in the urinary bladder.    Reproductive organs: The prostate is not significantly enlarged.    Body wall: There is a fat containing left inguinal hernia.    Musculoskeletal: No aggressive osseous lesion.  There are postop changes of the left sacroiliac joint.  There are several remote bilateral posterior rib fractures.  There is a remote nonunited left inferior pubic ramus fracture.                                       Medications   HYDROmorphone injection 1 mg (1 mg Intravenous Given 1/23/25 1701)   ondansetron injection 4 mg (4 mg Intravenous Given 1/23/25 1700)   iohexoL (OMNIPAQUE 350) 350 mg iodine/mL injection (100 mLs Intravenous Given 1/23/25 1733)   HYDROmorphone injection 1 mg (1 mg Intravenous Given 1/23/25 1721)   HYDROmorphone injection 1 mg (1 mg Intravenous Given 1/23/25 1757)   cefTRIAXone injection 1 g (1 g Intravenous Given 1/23/25 1757)   iohexoL (OMNIPAQUE 350) 350 mg iodine/mL injection (75 mLs Intravenous Given 1/23/25 1912)   HYDROmorphone injection 1 mg (1 mg Intravenous Given 1/23/25 1926)   HYDROmorphone injection 1 mg (1 mg Intravenous Given 1/23/25 2016)     Medical Decision Making  After reviewing the diagnostic workup done here in emergency department, the patient was started on antibiotics.  The patient has active extravasation contrast so he will need to be transferred out for Interventional Radiology to embolize.   Initiating blood transfusion here in the emergency department.  Case discussed with ED attending physician who provided recommendations and guided treatment plan.    Amount and/or Complexity of Data Reviewed  Labs: ordered.  Radiology: ordered.    Risk  Prescription drug management.              Attending Attestation:             Attending ED Notes:   Attending Critical Care:   Critical Care Times:   Direct Patient Care (initial evaluation, reassessments, and time considering the case)................................................................10 minutes.   Additional History from reviewing old medical records or taking additional history from the family, EMS, PCP, etc.......................10 minutes.   Ordering, Reviewing, and Interpreting Diagnostic Studies...............................................................................................................10 minutes.   Documentation..................................................................................................................................................................................10 minutes.   ==============================================================  · Total Critical Care Time - exclusive of procedural time: 35 minutes.  ==============================================================  Critical care was necessary to treat or prevent imminent or life-threatening deterioration of the following conditions: 40 min.   Critical care was time spent personally by me on the following activities: obtaining history from patient or relative, examination of patient, review of x-rays / CT sent with the patient, ordering lab, x-rays, and/or EKG, development of treatment plan with patient or relative, ordering and performing treatments and interventions, interpretation of cardiac measurements and re-evaluation of patient's conition.   Critical Care Condition: potentially life-threatening     I have seen and evaluated the patient at  bedside and I mostly agree with the mid level's history, physical examination, clinical impression, and disposition.  Please see my addendum below for further details and explanation.    Emergent evaluation of a 64-year-old male presents emergency department with flank pain.  Patient with significant flank pain here in the ER.  Patient recently discharged from outside hospital with a perinephric hematoma.  Patient is in severe intractable pain.  Patient has received multiple rounds of IV Dilaudid.  Patient unfortunately underwent CT scan here in the emergency department and shows that patient has worsening expanding hematoma with active contrast extravasation.  Patient case was discussed with IR for transfer who wanted to get a formal CTA bleeding scan as IR did not believe the initial CT scan, so patient remained here for further testing and CTA demonstrates active contrast extravasation.  Patient has been typed and crossed for 1 unit of blood.  Patient has been accepted as a transfer to [facility] for Interventional Radiology.                               Clinical Impression:  Final diagnoses:  [S37.019A] Renal hematoma          ED Disposition Condition    Transfer to Another Facility Stable                Tha Gonzalez III, ROSALBA  01/23/25 1816       Jovanni Ge DO  01/24/25 1110

## 2025-01-23 NOTE — LETTER
"February 1, 2025             [place] [id number]  [facility] [address]  Phone: [phone]  February 1, 2025     Patient: Joe Morgan (Tommy)     YOB: 1960        To Whom It May Concern:    Chalo Morgan was admitted to the hospital on 1/23/2025  9:29 PM and discharged on  2/1/2025 .  He may return to work on 2/6/2025 .  If you have any questions or concerns, or if I can be of any further assistance, please do not hesitate to contact me.    Sincerely,        Jameel Raya, DO  Department of Hospital Medicine     "

## 2025-01-24 PROBLEM — I48.0 PAROXYSMAL ATRIAL FIBRILLATION: Status: ACTIVE | Noted: 2024-06-25

## 2025-01-24 PROBLEM — K68.3 RETROPERITONEAL HEMATOMA: Status: ACTIVE | Noted: 2025-01-24

## 2025-01-24 PROBLEM — N17.9 AKI (ACUTE KIDNEY INJURY): Status: ACTIVE | Noted: 2025-01-24

## 2025-01-24 LAB
ABO + RH BLD: NORMAL
ALBUMIN SERPL BCP-MCNC: 1.7 G/DL (ref 3.5–5.2)
ALP SERPL-CCNC: 84 U/L (ref 40–150)
ALT SERPL W/O P-5'-P-CCNC: 20 U/L (ref 10–44)
ANION GAP SERPL CALC-SCNC: 9 MMOL/L (ref 8–16)
ANION GAP SERPL CALC-SCNC: 9 MMOL/L (ref 8–16)
APTT PPP: 26.6 SEC (ref 21–32)
AST SERPL-CCNC: 18 U/L (ref 10–40)
BASOPHILS # BLD AUTO: 0.01 K/UL (ref 0–0.2)
BASOPHILS # BLD AUTO: 0.03 K/UL (ref 0–0.2)
BASOPHILS NFR BLD: 0.1 % (ref 0–1.9)
BASOPHILS NFR BLD: 0.2 % (ref 0–1.9)
BILIRUB SERPL-MCNC: 0.4 MG/DL (ref 0.1–1)
BLD GP AB SCN CELLS X3 SERPL QL: NORMAL
BLD PROD TYP BPU: NORMAL
BLOOD UNIT EXPIRATION DATE: NORMAL
BLOOD UNIT TYPE CODE: 6200
BLOOD UNIT TYPE: NORMAL
BUN SERPL-MCNC: 26 MG/DL (ref 8–23)
BUN SERPL-MCNC: 26 MG/DL (ref 8–23)
CALCIUM SERPL-MCNC: 7.5 MG/DL (ref 8.7–10.5)
CALCIUM SERPL-MCNC: 7.6 MG/DL (ref 8.7–10.5)
CHLORIDE SERPL-SCNC: 106 MMOL/L (ref 95–110)
CHLORIDE SERPL-SCNC: 107 MMOL/L (ref 95–110)
CO2 SERPL-SCNC: 16 MMOL/L (ref 23–29)
CO2 SERPL-SCNC: 18 MMOL/L (ref 23–29)
CODING SYSTEM: NORMAL
CREAT SERPL-MCNC: 1.1 MG/DL (ref 0.5–1.4)
CREAT SERPL-MCNC: 1.2 MG/DL (ref 0.5–1.4)
CROSSMATCH INTERPRETATION: NORMAL
DIFFERENTIAL METHOD BLD: ABNORMAL
DIFFERENTIAL METHOD BLD: ABNORMAL
DISPENSE STATUS: NORMAL
EOSINOPHIL # BLD AUTO: 0 K/UL (ref 0–0.5)
EOSINOPHIL # BLD AUTO: 0.1 K/UL (ref 0–0.5)
EOSINOPHIL NFR BLD: 0.1 % (ref 0–8)
EOSINOPHIL NFR BLD: 0.7 % (ref 0–8)
ERYTHROCYTE [DISTWIDTH] IN BLOOD BY AUTOMATED COUNT: 17 % (ref 11.5–14.5)
ERYTHROCYTE [DISTWIDTH] IN BLOOD BY AUTOMATED COUNT: 17 % (ref 11.5–14.5)
ERYTHROCYTE [DISTWIDTH] IN BLOOD BY AUTOMATED COUNT: 17.1 % (ref 11.5–14.5)
ERYTHROCYTE [DISTWIDTH] IN BLOOD BY AUTOMATED COUNT: 17.6 % (ref 11.5–14.5)
ERYTHROCYTE [DISTWIDTH] IN BLOOD BY AUTOMATED COUNT: 17.7 % (ref 11.5–14.5)
ERYTHROCYTE [DISTWIDTH] IN BLOOD BY AUTOMATED COUNT: 17.8 % (ref 11.5–14.5)
EST. GFR  (NO RACE VARIABLE): >60 ML/MIN/1.73 M^2
EST. GFR  (NO RACE VARIABLE): >60 ML/MIN/1.73 M^2
ESTIMATED AVG GLUCOSE: 243 MG/DL (ref 68–131)
GLUCOSE SERPL-MCNC: 314 MG/DL (ref 70–110)
GLUCOSE SERPL-MCNC: 314 MG/DL (ref 70–110)
HBA1C MFR BLD: 10.1 % (ref 4–5.6)
HCT VFR BLD AUTO: 18.4 % (ref 40–54)
HCT VFR BLD AUTO: 20 % (ref 40–54)
HCT VFR BLD AUTO: 21.1 % (ref 40–54)
HCT VFR BLD AUTO: 22 % (ref 40–54)
HCT VFR BLD AUTO: 22.1 % (ref 40–54)
HCT VFR BLD AUTO: 22.4 % (ref 40–54)
HGB BLD-MCNC: 6.1 G/DL (ref 14–18)
HGB BLD-MCNC: 6.5 G/DL (ref 14–18)
HGB BLD-MCNC: 7 G/DL (ref 14–18)
HGB BLD-MCNC: 7.1 G/DL (ref 14–18)
HGB BLD-MCNC: 7.1 G/DL (ref 14–18)
HGB BLD-MCNC: 7.5 G/DL (ref 14–18)
IMM GRANULOCYTES # BLD AUTO: 0.15 K/UL (ref 0–0.04)
IMM GRANULOCYTES # BLD AUTO: 0.21 K/UL (ref 0–0.04)
IMM GRANULOCYTES NFR BLD AUTO: 1 % (ref 0–0.5)
IMM GRANULOCYTES NFR BLD AUTO: 1.6 % (ref 0–0.5)
INR PPP: 1.2 (ref 0.8–1.2)
LYMPHOCYTES # BLD AUTO: 0.3 K/UL (ref 1–4.8)
LYMPHOCYTES # BLD AUTO: 0.9 K/UL (ref 1–4.8)
LYMPHOCYTES NFR BLD: 2.6 % (ref 18–48)
LYMPHOCYTES NFR BLD: 6.1 % (ref 18–48)
MAGNESIUM SERPL-MCNC: 2.1 MG/DL (ref 1.6–2.6)
MCH RBC QN AUTO: 26.9 PG (ref 27–31)
MCH RBC QN AUTO: 27.1 PG (ref 27–31)
MCH RBC QN AUTO: 27.7 PG (ref 27–31)
MCH RBC QN AUTO: 28 PG (ref 27–31)
MCH RBC QN AUTO: 28.5 PG (ref 27–31)
MCH RBC QN AUTO: 28.6 PG (ref 27–31)
MCHC RBC AUTO-ENTMCNC: 31.8 G/DL (ref 32–36)
MCHC RBC AUTO-ENTMCNC: 32.1 G/DL (ref 32–36)
MCHC RBC AUTO-ENTMCNC: 32.5 G/DL (ref 32–36)
MCHC RBC AUTO-ENTMCNC: 33.2 G/DL (ref 32–36)
MCHC RBC AUTO-ENTMCNC: 33.5 G/DL (ref 32–36)
MCHC RBC AUTO-ENTMCNC: 33.6 G/DL (ref 32–36)
MCV RBC AUTO: 84 FL (ref 82–98)
MCV RBC AUTO: 84 FL (ref 82–98)
MCV RBC AUTO: 85 FL (ref 82–98)
MONOCYTES # BLD AUTO: 0.4 K/UL (ref 0.3–1)
MONOCYTES # BLD AUTO: 0.6 K/UL (ref 0.3–1)
MONOCYTES NFR BLD: 3 % (ref 4–15)
MONOCYTES NFR BLD: 4.3 % (ref 4–15)
NEUTROPHILS # BLD AUTO: 12.2 K/UL (ref 1.8–7.7)
NEUTROPHILS # BLD AUTO: 12.8 K/UL (ref 1.8–7.7)
NEUTROPHILS NFR BLD: 87.7 % (ref 38–73)
NEUTROPHILS NFR BLD: 92.6 % (ref 38–73)
NRBC BLD-RTO: 0 /100 WBC
NRBC BLD-RTO: 0 /100 WBC
NUM UNITS TRANS PACKED RBC: NORMAL
OHS QRS DURATION: 86 MS
OHS QRS DURATION: 88 MS
OHS QTC CALCULATION: 492 MS
OHS QTC CALCULATION: 495 MS
PHOSPHATE SERPL-MCNC: 4.8 MG/DL (ref 2.7–4.5)
PLATELET # BLD AUTO: 269 K/UL (ref 150–450)
PLATELET # BLD AUTO: 279 K/UL (ref 150–450)
PLATELET # BLD AUTO: 327 K/UL (ref 150–450)
PLATELET # BLD AUTO: 331 K/UL (ref 150–450)
PLATELET # BLD AUTO: 335 K/UL (ref 150–450)
PLATELET # BLD AUTO: 347 K/UL (ref 150–450)
PMV BLD AUTO: 11.1 FL (ref 9.2–12.9)
PMV BLD AUTO: 9 FL (ref 9.2–12.9)
PMV BLD AUTO: 9.2 FL (ref 9.2–12.9)
PMV BLD AUTO: 9.4 FL (ref 9.2–12.9)
PMV BLD AUTO: 9.5 FL (ref 9.2–12.9)
PMV BLD AUTO: 9.6 FL (ref 9.2–12.9)
POCT GLUCOSE: 200 MG/DL (ref 70–110)
POCT GLUCOSE: 257 MG/DL (ref 70–110)
POCT GLUCOSE: 317 MG/DL (ref 70–110)
POCT GLUCOSE: 339 MG/DL (ref 70–110)
POTASSIUM SERPL-SCNC: 5.4 MMOL/L (ref 3.5–5.1)
POTASSIUM SERPL-SCNC: 6 MMOL/L (ref 3.5–5.1)
PROT SERPL-MCNC: 5.9 G/DL (ref 6–8.4)
PROTHROMBIN TIME: 13 SEC (ref 9–12.5)
RBC # BLD AUTO: 2.18 M/UL (ref 4.6–6.2)
RBC # BLD AUTO: 2.35 M/UL (ref 4.6–6.2)
RBC # BLD AUTO: 2.48 M/UL (ref 4.6–6.2)
RBC # BLD AUTO: 2.6 M/UL (ref 4.6–6.2)
RBC # BLD AUTO: 2.62 M/UL (ref 4.6–6.2)
RBC # BLD AUTO: 2.63 M/UL (ref 4.6–6.2)
SODIUM SERPL-SCNC: 131 MMOL/L (ref 136–145)
SODIUM SERPL-SCNC: 134 MMOL/L (ref 136–145)
SPECIMEN OUTDATE: NORMAL
WBC # BLD AUTO: 12.55 K/UL (ref 3.9–12.7)
WBC # BLD AUTO: 12.6 K/UL (ref 3.9–12.7)
WBC # BLD AUTO: 13.2 K/UL (ref 3.9–12.7)
WBC # BLD AUTO: 13.3 K/UL (ref 3.9–12.7)
WBC # BLD AUTO: 13.87 K/UL (ref 3.9–12.7)
WBC # BLD AUTO: 14.58 K/UL (ref 3.9–12.7)

## 2025-01-24 PROCEDURE — 99291 CRITICAL CARE FIRST HOUR: CPT | Mod: ,,, | Performed by: INTERNAL MEDICINE

## 2025-01-24 PROCEDURE — 85025 COMPLETE CBC W/AUTO DIFF WBC: CPT | Mod: 91

## 2025-01-24 PROCEDURE — 86920 COMPATIBILITY TEST SPIN: CPT | Performed by: STUDENT IN AN ORGANIZED HEALTH CARE EDUCATION/TRAINING PROGRAM

## 2025-01-24 PROCEDURE — 85027 COMPLETE CBC AUTOMATED: CPT

## 2025-01-24 PROCEDURE — 20000000 HC ICU ROOM

## 2025-01-24 PROCEDURE — 25000003 PHARM REV CODE 250

## 2025-01-24 PROCEDURE — 86920 COMPATIBILITY TEST SPIN: CPT

## 2025-01-24 PROCEDURE — P9021 RED BLOOD CELLS UNIT: HCPCS | Performed by: STUDENT IN AN ORGANIZED HEALTH CARE EDUCATION/TRAINING PROGRAM

## 2025-01-24 PROCEDURE — 93005 ELECTROCARDIOGRAM TRACING: CPT

## 2025-01-24 PROCEDURE — 85610 PROTHROMBIN TIME: CPT

## 2025-01-24 PROCEDURE — 94761 N-INVAS EAR/PLS OXIMETRY MLT: CPT

## 2025-01-24 PROCEDURE — 93010 ELECTROCARDIOGRAM REPORT: CPT | Mod: ,,, | Performed by: INTERNAL MEDICINE

## 2025-01-24 PROCEDURE — P9016 RBC LEUKOCYTES REDUCED: HCPCS

## 2025-01-24 PROCEDURE — 63600175 PHARM REV CODE 636 W HCPCS: Mod: TB

## 2025-01-24 PROCEDURE — 36430 TRANSFUSION BLD/BLD COMPNT: CPT

## 2025-01-24 PROCEDURE — 30233N1 TRANSFUSION OF NONAUTOLOGOUS RED BLOOD CELLS INTO PERIPHERAL VEIN, PERCUTANEOUS APPROACH: ICD-10-PCS

## 2025-01-24 PROCEDURE — 25500020 PHARM REV CODE 255: Performed by: INTERNAL MEDICINE

## 2025-01-24 PROCEDURE — 80048 BASIC METABOLIC PNL TOTAL CA: CPT | Mod: XB | Performed by: INTERNAL MEDICINE

## 2025-01-24 PROCEDURE — 63600175 PHARM REV CODE 636 W HCPCS

## 2025-01-24 PROCEDURE — 85025 COMPLETE CBC W/AUTO DIFF WBC: CPT

## 2025-01-24 PROCEDURE — 80053 COMPREHEN METABOLIC PANEL: CPT

## 2025-01-24 PROCEDURE — 25500020 PHARM REV CODE 255: Performed by: STUDENT IN AN ORGANIZED HEALTH CARE EDUCATION/TRAINING PROGRAM

## 2025-01-24 PROCEDURE — 86901 BLOOD TYPING SEROLOGIC RH(D): CPT

## 2025-01-24 PROCEDURE — 85027 COMPLETE CBC AUTOMATED: CPT | Mod: 91

## 2025-01-24 PROCEDURE — 83036 HEMOGLOBIN GLYCOSYLATED A1C: CPT

## 2025-01-24 PROCEDURE — 85730 THROMBOPLASTIN TIME PARTIAL: CPT

## 2025-01-24 PROCEDURE — 83735 ASSAY OF MAGNESIUM: CPT

## 2025-01-24 PROCEDURE — 84100 ASSAY OF PHOSPHORUS: CPT

## 2025-01-24 RX ORDER — METOPROLOL SUCCINATE 25 MG/1
25 TABLET, EXTENDED RELEASE ORAL DAILY
Status: DISCONTINUED | OUTPATIENT
Start: 2025-01-24 | End: 2025-02-01 | Stop reason: HOSPADM

## 2025-01-24 RX ORDER — INSULIN GLARGINE 100 [IU]/ML
15 INJECTION, SOLUTION SUBCUTANEOUS DAILY
Status: DISCONTINUED | OUTPATIENT
Start: 2025-01-24 | End: 2025-01-26

## 2025-01-24 RX ORDER — HYDROMORPHONE HYDROCHLORIDE 1 MG/ML
0.2 INJECTION, SOLUTION INTRAMUSCULAR; INTRAVENOUS; SUBCUTANEOUS EVERY 4 HOURS PRN
Status: DISCONTINUED | OUTPATIENT
Start: 2025-01-24 | End: 2025-01-25

## 2025-01-24 RX ORDER — INSULIN ASPART 100 [IU]/ML
0-15 INJECTION, SOLUTION INTRAVENOUS; SUBCUTANEOUS EVERY 6 HOURS PRN
Status: DISCONTINUED | OUTPATIENT
Start: 2025-01-24 | End: 2025-01-24

## 2025-01-24 RX ORDER — ONDANSETRON HYDROCHLORIDE 2 MG/ML
4 INJECTION, SOLUTION INTRAVENOUS EVERY 6 HOURS PRN
Status: DISCONTINUED | OUTPATIENT
Start: 2025-01-24 | End: 2025-02-01 | Stop reason: HOSPADM

## 2025-01-24 RX ORDER — BUPRENORPHINE AND NALOXONE 8; 2 MG/1; MG/1
3 FILM, SOLUBLE BUCCAL; SUBLINGUAL DAILY
Status: DISCONTINUED | OUTPATIENT
Start: 2025-01-24 | End: 2025-01-28

## 2025-01-24 RX ORDER — INSULIN GLARGINE 100 [IU]/ML
15 INJECTION, SOLUTION SUBCUTANEOUS DAILY
Status: DISCONTINUED | OUTPATIENT
Start: 2025-01-24 | End: 2025-01-24

## 2025-01-24 RX ORDER — HYDROMORPHONE HYDROCHLORIDE 1 MG/ML
0.2 INJECTION, SOLUTION INTRAMUSCULAR; INTRAVENOUS; SUBCUTANEOUS EVERY 6 HOURS PRN
Status: DISCONTINUED | OUTPATIENT
Start: 2025-01-24 | End: 2025-01-24

## 2025-01-24 RX ORDER — HYDROCODONE BITARTRATE AND ACETAMINOPHEN 500; 5 MG/1; MG/1
TABLET ORAL
Status: DISCONTINUED | OUTPATIENT
Start: 2025-01-24 | End: 2025-01-26

## 2025-01-24 RX ORDER — INSULIN ASPART 100 [IU]/ML
0-5 INJECTION, SOLUTION INTRAVENOUS; SUBCUTANEOUS
Status: DISCONTINUED | OUTPATIENT
Start: 2025-01-24 | End: 2025-01-27

## 2025-01-24 RX ORDER — INSULIN ASPART 100 [IU]/ML
6 INJECTION, SOLUTION INTRAVENOUS; SUBCUTANEOUS
Status: DISCONTINUED | OUTPATIENT
Start: 2025-01-24 | End: 2025-01-27

## 2025-01-24 RX ORDER — ACETAMINOPHEN 500 MG
1000 TABLET ORAL
Status: COMPLETED | OUTPATIENT
Start: 2025-01-24 | End: 2025-01-24

## 2025-01-24 RX ORDER — GLUCAGON 1 MG
1 KIT INJECTION
Status: DISCONTINUED | OUTPATIENT
Start: 2025-01-24 | End: 2025-01-29

## 2025-01-24 RX ADMIN — HYDROMORPHONE HYDROCHLORIDE 0.2 MG: 1 INJECTION, SOLUTION INTRAMUSCULAR; INTRAVENOUS; SUBCUTANEOUS at 04:01

## 2025-01-24 RX ADMIN — METOPROLOL SUCCINATE 25 MG: 25 TABLET, EXTENDED RELEASE ORAL at 08:01

## 2025-01-24 RX ADMIN — INSULIN ASPART 4 UNITS: 100 INJECTION, SOLUTION INTRAVENOUS; SUBCUTANEOUS at 11:01

## 2025-01-24 RX ADMIN — ONDANSETRON 4 MG: 2 INJECTION INTRAMUSCULAR; INTRAVENOUS at 03:01

## 2025-01-24 RX ADMIN — HYDROMORPHONE HYDROCHLORIDE 0.2 MG: 1 INJECTION, SOLUTION INTRAMUSCULAR; INTRAVENOUS; SUBCUTANEOUS at 10:01

## 2025-01-24 RX ADMIN — HYDROMORPHONE HYDROCHLORIDE 0.2 MG: 1 INJECTION, SOLUTION INTRAMUSCULAR; INTRAVENOUS; SUBCUTANEOUS at 07:01

## 2025-01-24 RX ADMIN — IOHEXOL 100 ML: 350 INJECTION, SOLUTION INTRAVENOUS at 10:01

## 2025-01-24 RX ADMIN — INSULIN ASPART 6 UNITS: 100 INJECTION, SOLUTION INTRAVENOUS; SUBCUTANEOUS at 11:01

## 2025-01-24 RX ADMIN — ACETAMINOPHEN 1000 MG: 500 TABLET ORAL at 05:01

## 2025-01-24 RX ADMIN — INSULIN GLARGINE 15 UNITS: 100 INJECTION, SOLUTION SUBCUTANEOUS at 06:01

## 2025-01-24 RX ADMIN — BUPRENORPHINE AND NALOXONE 3 FILM: 8; 2 FILM BUCCAL; SUBLINGUAL at 08:01

## 2025-01-24 RX ADMIN — HYDROMORPHONE HYDROCHLORIDE 0.2 MG: 1 INJECTION, SOLUTION INTRAMUSCULAR; INTRAVENOUS; SUBCUTANEOUS at 06:01

## 2025-01-24 NOTE — PLAN OF CARE
MICU DAILY GOALS     Family/Goals of care/Code Status   Code Status: Full Code    24H Vital Sign Range  Temp:  [96 °F (35.6 °C)-99.1 °F (37.3 °C)]   Pulse:  []   Resp:  [16-31]   BP: (109-185)/(58-89)   SpO2:  [92 %-100 %]      Shift Events (include procedures and significant events)   No acute events throughout shift. Patient transfer orders canceled. Receiving 2 units pRBCs for hgb 6.1; VSS.     AWAKE RASS: Goal -    Actual - RASS (Paz Agitation-Sedation Scale): alert and calm    Restraint necessity: Not necessary   BREATHE SBT: Not intubated    Coordinate A & B, analgesics/sedatives Pain: managed   SAT: Not intubated   Delirium CAM-ICU:     Early(intubated/ Progressive (non-intubated) Mobility MOVE Screen (INTUBATED ONLY): Not intubated    Activity: Activity Management: Rolling - L1   Feeding/Nutrition Diet order: Diet/Nutrition Received: NPO,     Thrombus DVT prophylaxis: VTE Core Measure: Per order contraindicated for SCDs/Anticoagulants   HOB Elevation Head of Bed (HOB) Positioning: HOB at 30-45 degrees   Ulcer Prophylaxis GI: yes   Glucose control managed     Skin Skin assessment:     Sacrum intact/not altered? Yes  Heels intact/not altered? Yes  Surgical wound? Yes    CHECK ONE!   (no altered skin or altered skin) and sub boxes:  [] No Altered Skin Integrity Present    []Prevention Measures Documented    [x] Altered Skin Integrity Present or Discovered   [x] LDA present in EPIC, daily doc completed              [] LDA added if not in EPIC (describe wound).                    When describing wound, do not stage, use descriptive words only.    [] Wound Image Taken (required on admit,                   transfer/discharge and every Tuesday)    Wound Care Consulted? No   Bowel Function no issues    Indwelling Catheter Necessity            De-escalation Antibiotics No      Problem: Adult Inpatient Plan of Care  Goal: Patient-Specific Goal (Individualized)  Outcome: Progressing  Goal: Absence of  Hospital-Acquired Illness or Injury  Outcome: Progressing  Goal: Optimal Comfort and Wellbeing  Outcome: Progressing  Goal: Readiness for Transition of Care  Outcome: Progressing     Problem: Skin Injury Risk Increased  Goal: Skin Health and Integrity  Outcome: Progressing

## 2025-01-24 NOTE — SUBJECTIVE & OBJECTIVE
Interval History/Significant Events: NAOE stable H and H, one call for pain control    Review of Systems  Objective:     Vital Signs (Most Recent):  Temp: 99.1 °F (37.3 °C) (01/24/25 1100)  Pulse: 80 (01/24/25 1200)  Resp: (!) 22 (01/24/25 1200)  BP: (!) 145/65 (01/24/25 1200)  SpO2: 97 % (01/24/25 1200) Vital Signs (24h Range):  Temp:  [97.8 °F (36.6 °C)-99.1 °F (37.3 °C)] 99.1 °F (37.3 °C)  Pulse:  [] 80  Resp:  [18-31] 22  SpO2:  [92 %-100 %] 97 %  BP: (109-191)/() 145/65   Weight: 112.6 kg (248 lb 4.8 oz)  Body mass index is 35.63 kg/m².      Intake/Output Summary (Last 24 hours) at 1/24/2025 1307  Last data filed at 1/24/2025 0641  Gross per 24 hour   Intake 1080 ml   Output 900 ml   Net 180 ml          Physical Exam  Vitals and nursing note reviewed.   Constitutional:       Appearance: He is well-developed.   HENT:      Head: Normocephalic.      Mouth/Throat:      Mouth: Mucous membranes are moist.   Eyes:      Pupils: Pupils are equal, round, and reactive to light.   Cardiovascular:      Rate and Rhythm: Normal rate and regular rhythm.      Pulses: Normal pulses.   Pulmonary:      Effort: Pulmonary effort is normal.   Abdominal:      General: Bowel sounds are normal. There is no distension.      Palpations: Abdomen is soft.      Tenderness: There is no abdominal tenderness.   Musculoskeletal:         General: Swelling present.      Comments: Swelling right groin at incision site.    Skin:     General: Skin is warm and dry.      Capillary Refill: Capillary refill takes less than 2 seconds.   Neurological:      Mental Status: He is alert and oriented to person, place, and time.   Psychiatric:         Mood and Affect: Mood normal.         Behavior: Behavior normal.         Thought Content: Thought content normal.         Judgment: Judgment normal.            Vents:     Lines/Drains/Airways       Peripheral Intravenous Line  Duration                  Peripheral IV - Single Lumen 20 G  Anterior;Distal;Left Forearm -- days         Peripheral IV - Single Lumen 01/23/25 18 G Right Antecubital 1 day                  Significant Labs:    CBC/Anemia Profile:  Recent Labs   Lab 01/23/25  1700 01/24/25  0041 01/24/25  0349   WBC 14.58* 13.20* 13.30*   HGB 10.0* 7.0* 7.1*   HCT 30.4* 22.0* 22.1*    279 269   MCV 83 85 84   RDW 17.4* 17.6* 17.8*        Chemistries:  Recent Labs   Lab 01/23/25  1700 01/24/25  0349 01/24/25  0512   * 131* 134*   K 4.9 6.0* 5.4*    106 107   CO2 13* 16* 18*   BUN 25* 26* 26*   CREATININE 1.4 1.1 1.2   CALCIUM 8.1* 7.5* 7.6*   ALBUMIN 2.0* 1.7*  --    PROT 6.8 5.9*  --    BILITOT 0.6 0.4  --    ALKPHOS 112 84  --    ALT 29 20  --    AST 29 18  --    MG 2.1 2.1  --    PHOS  --  4.8*  --        All pertinent labs within the past 24 hours have been reviewed.    Significant Imaging:  I have reviewed all pertinent imaging results/findings within the past 24 hours.

## 2025-01-24 NOTE — ED NOTES
Joe Morgan now accepted in transfer to [facility] ED by Dr. Stevens; call report to:  [phone].  Transfer Center arranging a flight transport.

## 2025-01-24 NOTE — PLAN OF CARE
Problem: Adult Inpatient Plan of Care  Goal: Plan of Care Review  Outcome: Progressing  Goal: Patient-Specific Goal (Individualized)  Outcome: Progressing  Goal: Absence of Hospital-Acquired Illness or Injury  Outcome: Progressing  Goal: Optimal Comfort and Wellbeing  Outcome: Progressing  Goal: Readiness for Transition of Care  Outcome: Progressing     MICU DAILY GOALS     Family/Goals of care/Code Status   Code Status: Full Code    24H Vital Sign Range  Temp:  [97.8 °F (36.6 °C)-98.9 °F (37.2 °C)]   Pulse:  [72-90]   Resp:  [18-27]   BP: (109-191)/()   SpO2:  [94 %-100 %]      Shift Events (include procedures and significant events)   Pt transferred to unit from IR, vitals stable, no evidence of bleeding from site or elsewhere, maintained supine position for 2 hours post transfer, no other issues, continue to monitor    AWAKE RASS: Goal -    Actual - RASS (Paz Agitation-Sedation Scale): alert and calm    Restraint necessity: Not necessary   BREATHE SBT: NA    Coordinate A & B, analgesics/sedatives Pain: managed   SAT: NA   Delirium CAM-ICU:     Early(intubated/ Progressive (non-intubated) Mobility MOVE Screen (INTUBATED ONLY): NA    Activity: Activity Management: Rolling - L1   Feeding/Nutrition Diet order: Diet/Nutrition Received: consistent carb/diabetic diet, other (see comments) (renal),     Thrombus DVT prophylaxis: VTE Core Measure: Per order contraindicated for SCDs/Anticoagulants   HOB Elevation Head of Bed (HOB) Positioning: HOB at 30 degrees   Ulcer Prophylaxis GI: yes   Glucose control managed     Skin Skin assessment:     Sacrum intact/not altered? Yes  Heels intact/not altered? Yes  Surgical wound? Yes    CHECK ONE!   (no altered skin or altered skin) and sub boxes:  [] No Altered Skin Integrity Present    []Prevention Measures Documented    [x] Altered Skin Integrity Present or Discovered   [] LDA present in EPIC, daily doc completed              [] LDA added if not in EPIC (describe  wound).                    When describing wound, do not stage, use descriptive words only.    [] Wound Image Taken (required on admit,                   transfer/discharge and every Tuesday)    Wound Care Consulted? No   Bowel Function no issues    Indwelling Catheter Necessity    Not neccesary        De-escalation Antibiotics None prescribed        VS and assessment per flow sheet, patient progressing towards goals as tolerated, plan of care reviewed with [unfilled] and family, all concerns addressed, will continue to monitor.

## 2025-01-24 NOTE — PLAN OF CARE
"Dr. Stevens identified bleeding vessel was also feeding right testicle. Dr. Stevens called patient's wife, Mindi Morgan, and explained the situation and the risk of embolizing the bleed including that the patient may lose his right testicle. Per the patient's wife, "yes sir I understand. Please do whatever needs to be done to stop the bleeding."   "

## 2025-01-24 NOTE — HPI
Joe Morgan is a 64 y.o. male with a PMHx of pAF, HTN and T2DM presenting as a transfer from outside ED due to retroperitoneal hematomas with active contrast extravasation on CTA. Underwent angiogram and right gonadal artery embolization with IR. Urology consulted for RP bleed and concerns for testicular atrophy s/p embolization.    On assessment the patient is AFVSS. States that he originally presented [facility] ED for right flank pain and CTA found to have right perinephric and retroperitoneal hematoma. Also passed a presumed kidney stone in the ED yesterday. Has never seen a urologist in the past. Denies fevers, chills, hematuria.     WBC 14.58, Hg at 10.0, received 1 upRBC at OSH. Cr is 1.4 (baseline appears 0.9-1.3). UA nitrite negative, 6 RBC, 47 WBC and few bacteria with 2 squamous cells.     CT GI bleed showed contrast in the collecting system, no obvious hydronephrosis, or stone, right perinephric and retroperitoneal hematoma present.

## 2025-01-24 NOTE — H&P
"  [facility] - Medical ICU  Critical Care Medicine  History & Physical    Patient Name: Joe Morgan  MRN: [id number]  Admission Date: 1/23/2025  Hospital Length of Stay: 1 days  Code Status: Full Code  Attending Physician: Matthew Valdivia*   Primary Care Provider: Johnnie Gibbs MD   Principal Problem: Retroperitoneal hematoma    Subjective:     HPI:  63 y/o M with a medical hx of DM, hip fracture, GERD, HTN, HLD, and paroxysmal Afib who presented to [facility] as a transfer from [facility]. Pt originally presented to [facility] for R flank pain. He states he recently seen at [facility] for a kidney stone and thought pain was related to that.      Hospital Course from [facility]:  "Admitted and treated for UTI and hematoma of the right kidney after he presented with complaints of left flank pain and subjective fever. He had a CT of his abdomen and pelvis that was significant for right kidney pyleonephritis, trace right lung base pleural effusion. He was started on IVF and antibiotics. He continued to have pain and repeat CT was obtained and significant for GB distension, small pleural effusions, left inguinal hernia and interval development of subcapsular hematoma in the right kidney. He had follow up imaging the next day and had slight interval evolution of right subcapsular hemorrhage. He was on eliquis for afib which was stopped and his H/H was monitored. His imaging was also reviewed by Dr. Pina with urology for possible transfer but he recommended the patient continued to be monitored here. Did require 2 units of pRBC and blood cell counts have stabilized. The case was discussed with Dr. Pina who has agreed the patient is stable for discharge. Plan for follow up CT in 1 week and follow up with Dr. Pina after it is complete. Pt was deemed medically stable on day of discharge."    Patient states his pain immediately returned after being released. Pain located in the R " lower back radiating around the R flank and into the R testicle. It is constant, stabbing, severe in nature. Nothing makes it better or worse. He took no medications prior to arrival treat pain. Denies any symptoms on review of systems at this time.    CTA Abdomen showed: Large right perinephric and midline retroperitoneal hematomas with active contrast extravasation. Several areas of cortical hypoattenuation of the right kidney, suspicious for low-grade renal lacerations or infarcts.     IR consulted for emergent embolization. Concern for rupture of R gonadal artery, with concerns patient may lose R testicle. Admitted to MICU for concerns of hemorrhagic shock. Uro consulted      Hospital/ICU Course:  No notes on file     Past Medical History:   Diagnosis Date    Diabetes mellitus     GERD (gastroesophageal reflux disease)     Hip discomfort     Hip fracture     Hyperlipidemia     Hypertension     Pancreas disorder     Spine pain        Past Surgical History:   Procedure Laterality Date    FRACTURE SURGERY         Review of patient's allergies indicates:   Allergen Reactions    Nubain [nalbuphine] Anaphylaxis    Ativan [lorazepam] Other (See Comments)     Climbs wall         Family History    None       Tobacco Use    Smoking status: Never    Smokeless tobacco: Current     Types: Chew   Substance and Sexual Activity    Alcohol use: No    Drug use: No    Sexual activity: Yes     Partners: Male      Review of Systems   Constitutional:  Positive for activity change and fatigue. Negative for chills and fever.   HENT:  Negative for congestion and sore throat.    Respiratory:  Negative for choking, shortness of breath and wheezing.    Cardiovascular:  Negative for chest pain and leg swelling.   Gastrointestinal:  Positive for abdominal pain and nausea. Negative for abdominal distention, constipation, diarrhea and vomiting.   Genitourinary:  Negative for dysuria and urgency.   Musculoskeletal:  Positive for back pain.  Negative for arthralgias and myalgias.   Skin:  Negative for color change.   Neurological:  Positive for weakness. Negative for dizziness and headaches.   Psychiatric/Behavioral:  Negative for agitation and confusion.      Objective:     Vital Signs (Most Recent):  Temp: 98 °F (36.7 °C) (01/23/25 2345)  Pulse: 77 (01/23/25 2345)  Resp: (!) 25 (01/23/25 2345)  BP: 136/61 (01/23/25 2345)  SpO2: 100 % (01/23/25 2345) Vital Signs (24h Range):  Temp:  [97.8 °F (36.6 °C)-98.9 °F (37.2 °C)] 98 °F (36.7 °C)  Pulse:  [77-90] 77  Resp:  [18-25] 25  SpO2:  [96 %-100 %] 100 %  BP: (109-191)/() 136/61   Weight: 112.6 kg (248 lb 4.8 oz)  Body mass index is 35.63 kg/m².      Intake/Output Summary (Last 24 hours) at 1/24/2025 0026  Last data filed at 1/23/2025 2257  Gross per 24 hour   Intake 600 ml   Output --   Net 600 ml          Physical Exam  Vitals and nursing note reviewed.   Constitutional:       Appearance: He is ill-appearing.   HENT:      Mouth/Throat:      Mouth: Mucous membranes are moist.      Pharynx: Oropharynx is clear.   Eyes:      Extraocular Movements: Extraocular movements intact.      Pupils: Pupils are equal, round, and reactive to light.      Comments: 2+ PERRL   Cardiovascular:      Rate and Rhythm: Normal rate and regular rhythm.      Pulses: Normal pulses.      Heart sounds: Normal heart sounds.      Comments: NSR  Pulmonary:      Effort: Pulmonary effort is normal.      Breath sounds: Normal breath sounds.   Abdominal:      General: There is distension.      Palpations: Abdomen is soft.      Tenderness: There is generalized abdominal tenderness.   Musculoskeletal:         General: No swelling.      Right lower leg: No edema.      Left lower leg: No edema.      Comments: R. Groin site CDI, without hematoma/bleeding   Skin:     General: Skin is warm and dry.      Capillary Refill: Capillary refill takes less than 2 seconds.      Coloration: Skin is pale.   Neurological:      General: No focal deficit  present.      Mental Status: He is oriented to person, place, and time and easily aroused. Mental status is at baseline. He is lethargic.      GCS: GCS eye subscore is 3. GCS verbal subscore is 5. GCS motor subscore is 6.   Psychiatric:         Speech: Speech normal.         Behavior: Behavior is cooperative.          Vents:     Lines/Drains/Airways       Peripheral Intravenous Line  Duration                  Peripheral IV - Single Lumen 20 G Anterior;Distal;Left Forearm -- days         Peripheral IV - Single Lumen 01/23/25 18 G Right Antecubital 1 day                  Significant Labs:    CBC/Anemia Profile:  Recent Labs   Lab 01/23/25  1700   WBC 14.58*   HGB 10.0*   HCT 30.4*      MCV 83   RDW 17.4*        Chemistries:  Recent Labs   Lab 01/23/25  1700   *   K 4.9      CO2 13*   BUN 25*   CREATININE 1.4   CALCIUM 8.1*   ALBUMIN 2.0*   PROT 6.8   BILITOT 0.6   ALKPHOS 112   ALT 29   AST 29   MG 2.1       All pertinent labs within the past 24 hours have been reviewed.    Significant Imaging: I have reviewed all pertinent imaging results/findings within the past 24 hours.  Assessment/Plan:     Cardiac/Vascular  Paroxysmal atrial fibrillation  Hx of paroxysmal Afib. Currently in NSR.     Plan:  - Maintain K > 4, Mag > 2 and Ca/iCal WNL to decrease arrhythmogenic potential  - Rate control with metoprolol succinate 25 mg  - Emr chart review shows both metoprolol and Flecainide at home?? Pt unsure which if any he is taking  - Anticoagulation with Eliquis at home, reports hasn't taken any within past 2 days. Will hold in setting of retroperitoneal bleed  - Maintain on telemetry     Essential hypertension  Takes lisinopril 40 mg and metoprolol succinate 25 mg as home medications. HDS upon admission and s/p embolization.     - will hold lisinopril in setting of LAUREN    Renal/  LAUREN (acute kidney injury)  Creatinine 1.4 on admit, baseline around 0.8    Plan:   Lab Results   Component Value Date     CREATININE 1.4 01/23/2025     Likely 2/2 blood loss in setting of ruptured R gonadal artery.     Plan:  - Strict I&Os and daily weights   - Gentle IVF  - Avoid nephrotoxic agents such as NSAIDs, gadolinium and IV radiocontrast.  - Renally dose meds to current GFR.  - Maintain MAP > 65.    Endocrine  Type 2 diabetes mellitus  Last A1c reviewed-   Lab Results   Component Value Date    LABA1C 10.7 (H) 06/27/2018     Inpatient Antihyperglycemic Regiment:  Antihyperglycemics (From admission, onward)      Start     Stop Route Frequency Ordered    01/24/25 0900  insulin glargine U-100 (Lantus) pen 15 Units         -- SubQ Daily 01/24/25 0056    01/24/25 0715  insulin aspart U-100 pen 6 Units         -- SubQ 3 times daily with meals 01/24/25 0056    01/24/25 0155  insulin aspart U-100 pen 0-5 Units         -- SubQ Before meals & nightly PRN 01/24/25 0056          Blood Sugars (AccuCheck):    Recent Labs     01/23/25  1620   POCTGLUCOSE 297*       Obesity  Body mass index is 35.63 kg/m². Morbid obesity complicates all aspects of disease management from diagnostic modalities to treatment. Weight loss encouraged and health benefits explained to patient.     GI  * Retroperitoneal hematoma  65 y/o M with a medical hx of DM, hip fracture, GERD, HTN, HLD, and paroxysmal Afib who presented to [facility] as a transfer from [place] ED. Pt originally presented to [place] ED for R flank pain. He states he recently seen at [facility] for a kidney stone and thought pain was related to that.      CTA Abdomen showed: Large right perinephric and midline retroperitoneal hematomas with active contrast extravasation. Several areas of cortical hypoattenuation of the right kidney, suspicious for low-grade renal lacerations or infarcts.     IR consulted for emergent embolization. Concern for rupture of R gonadal artery, with concerns patient may lose R testicle. Admitted to MICU for concerns of hemorrhagic shock.     HDS upon  admission. Hgb stable at 10, baseline around 14. Now s/p IR embolization.    Plan:  S/P IR embolization  Uro eval for concerns of gondal ischemia, rec monitoring and OP follow up  Will trend hemoglobin and monitor for hemodynamic instability.  Transfuse PRN  Holding anti HTN and AC meds         Critical Care Daily Checklist:    A: Awake: RASS Goal/Actual Goal:    Actual:     B: Spontaneous Breathing Trial Performed?     C: SAT & SBT Coordinated?  N/A                      D: Delirium: CAM-ICU     E: Early Mobility Performed? Yes   F: Feeding Goal:    Status:     Current Diet Order   Procedures    Diet diabetic Renal; 2000 Calories (up to 75 gm per meal)     Order Specific Question:   Additional Diet Options:     Answer:   Renal     Order Specific Question:   Total calories / carbs:     Answer:   2000 Calories (up to 75 gm per meal)      AS: Analgesia/Sedation N/A   T: Thromboembolic Prophylaxis None in setting of active bleed   H: HOB > 300 Yes   U: Stress Ulcer Prophylaxis (if needed) none   G: Glucose Control insulin   B: Bowel Function     I: Indwelling Catheter (Lines & Hood) Necessity PIV   D: De-escalation of Antimicrobials/Pharmacotherapies none    Plan for the day/ETD Admit to MICU    Code Status:  Family/Goals of Care: Full Code  updated       Critical secondary to Patient has a condition that poses threat to life and bodily function: Hemorrhagic Shock    Critical care was time spent personally by me on the following activities: development of treatment plan with patient or surrogate and bedside caregivers, discussions with consultants, evaluation of patient's response to treatment, examination of patient, ordering and performing treatments and interventions, ordering and review of laboratory studies, ordering and review of radiographic studies, pulse oximetry, re-evaluation of patient's condition. This critical care time did not overlap with that of any other provider or involve time for any procedures.      Jose Francis, DO  Critical Care Medicine  [facility] - Medical ICU

## 2025-01-24 NOTE — HPI
"63 y/o M with a medical hx of DM, hip fracture, GERD, HTN, HLD, and paroxysmal Afib who presented to [facility] as a transfer from [facility]. Pt originally presented to [place] ED for R flank pain. He states he recently seen at [facility] for a kidney stone and thought pain was related to that.      Hospital Course from [facility]:  "Admitted and treated for UTI and hematoma of the right kidney after he presented with complaints of left flank pain and subjective fever. He had a CT of his abdomen and pelvis that was significant for right kidney pyleonephritis, trace right lung base pleural effusion. He was started on IVF and antibiotics. He continued to have pain and repeat CT was obtained and significant for GB distension, small pleural effusions, left inguinal hernia and interval development of subcapsular hematoma in the right kidney. He had follow up imaging the next day and had slight interval evolution of right subcapsular hemorrhage. He was on eliquis for afib which was stopped and his H/H was monitored. His imaging was also reviewed by Dr. Pina with urology for possible transfer but he recommended the patient continued to be monitored here. Did require 2 units of pRBC and blood cell counts have stabilized. The case was discussed with Dr. Pina who has agreed the patient is stable for discharge. Plan for follow up CT in 1 week and follow up with Dr. Pina after it is complete. Pt was deemed medically stable on day of discharge."    Patient states his pain immediately returned after being released. Pain located in the R lower back radiating around the R flank and into the R testicle. It is constant, stabbing, severe in nature. Nothing makes it better or worse. He took no medications prior to arrival treat pain. Denies any symptoms on review of systems at this time.    CTA Abdomen showed: Large right perinephric and midline retroperitoneal hematomas with active contrast extravasation. " Several areas of cortical hypoattenuation of the right kidney, suspicious for low-grade renal lacerations or infarcts.     IR consulted for emergent embolization. Concern for rupture of R gonadal artery, with concerns patient may lose R testicle. Admitted to MICU for concerns of hemorrhagic shock. Uro consulted

## 2025-01-24 NOTE — ASSESSMENT & PLAN NOTE
Creatinine 1.4 on admit, baseline around 0.8    Plan:   Lab Results   Component Value Date    CREATININE 1.4 01/23/2025     Likely 2/2 blood loss in setting of ruptured R gonadal artery.     Plan:  - Strict I&Os and daily weights   - Gentle IVF  - Avoid nephrotoxic agents such as NSAIDs, gadolinium and IV radiocontrast.  - Renally dose meds to current GFR.  - Maintain MAP > 65.

## 2025-01-24 NOTE — ED NOTES
Bed: 03  Expected date:   Expected time:   Means of arrival:   Comments:  LOPEZ cohen   Fax refill request from [facility]    Fax refill request for Loratadine-D 5-120 mg tablets    LOV 6-9-17     NOV 9-11-17/ pt has a few no shows already (fyi)    Last filled 10-11-16        Okay to refill ?

## 2025-01-24 NOTE — ASSESSMENT & PLAN NOTE
Joe Morgan is a 64 y.o. male with a PMHx of pAF, HTN and T2DM presenting as a transfer from outside ED due to retroperitoneal hematomas with active contrast extravasation on CTA. Underwent angiogram and right gonadal artery embolization with IR. Urology consulted for RP bleed and concerns for testicular atrophy s/p embolization.    -- Embolization of gonadal unlikely to cause testicular atrophy due collateral blood supply  -- Trend Hg, transfuse as necessary to maintain Hg >7.0.  -- If patient decompensates, or requires mass transfusions may require repeat embolization.   -- NPO until Hg stabilizes   -- Strict bed rest for 24-48 hours.  -- Hold AC/AP as feasible.   -- Will need outpatient referral to Urology to establish care in [facility].

## 2025-01-24 NOTE — PLAN OF CARE
Pt arrived to   for right renal and retroperitoneal embolization. Pt oriented to unit and staff. Plan of care reviewed with patient, patient verbalizes understanding. Comfort measures utilized. Pt safely transferred from stretcher to procedural table. Fall risk reviewed with patient, fall risk interventions maintained. Positioner pillows utilized to minimize pressure points. Blankets applied. Pt prepped and draped utilizing standard sterile technique. Timeouts completed utilizing standard universal time-out, per department and facility policy.  Anesthesia at bedside; Refer to anesthesia record regarding sedation and vital signs.

## 2025-01-24 NOTE — ASSESSMENT & PLAN NOTE
Takes lisinopril 40 mg and metoprolol succinate 25 mg as home medications. HDS upon admission and s/p embolization.     - will hold lisinopril in setting of LAUREN

## 2025-01-24 NOTE — PLAN OF CARE
[facility] - Medical ICU  Initial Discharge Assessment       Primary Care Provider: Johnnie Gibbs MD    Admission Diagnosis: Retroperitoneal hemorrhage [R58]    Admission Date: 1/23/2025  Expected Discharge Date: 1/28/2025    Transition of Care Barriers: Other (see comments) (patient has new insurance with [place] Everywhere)    Payor: /     Extended Emergency Contact Information  Primary Emergency Contact: Mindi Morgan  Address: [address] [place] [address] [facility]  Mobile Phone: [phone]  Relation: Spouse  Preferred language: English   needed? No    Discharge Plan A: Home with family  Discharge Plan B: Home with family      [id number] - [place], [place] - [address] AT [facility]  [address]  [place] [address]  Phone: [phone] Fax: [phone]    [id number] - [place], [place] - [address]  [address]  [place] [address]  Phone: [phone] Fax: [phone]      Initial Assessment (most recent)       Adult Discharge Assessment - 01/24/25 1152          Discharge Assessment    Assessment Type Discharge Planning Assessment     Confirmed/corrected address, phone number and insurance Yes     Confirmed Demographics Correct on Facesheet     Source of Information patient     When was your last doctors appointment? --   4 months ago    Does patient/caregiver understand observation status Yes     Communicated JUSTA with patient/caregiver No     Reason For Admission Hemorrhagic shock     People in Home spouse     Facility Arrived From: [facility] in [place], [place]     Do you expect to return to your current living situation? Yes     Do you have help at home or someone to help you manage your care at home? Yes     Who are your caregiver(s) and their phone number(s)? Mindi Morgan, spouse cp# [phone]     Prior to hospitilization cognitive status: Unable to Assess     Current cognitive status: Alert/Oriented;No Deficits      Walking or Climbing Stairs Difficulty no     Dressing/Bathing Difficulty no     Home Accessibility not wheelchair accessible     Home Layout Able to live on 1st floor     Equipment Currently Used at Home none     Readmission within 30 days? Yes     Patient currently being followed by outpatient case management? No     Do you currently have service(s) that help you manage your care at home? No     Do you take prescription medications? Yes     Do you have prescription coverage? Yes     Coverage BCBS/All out of state     Do you have any problems affording any of your prescribed medications? No     Is the patient taking medications as prescribed? yes     Who is going to help you get home at discharge? Mindi Morgan, spouse     How do you get to doctors appointments? car, drives self     Are you on dialysis? No     Do you take coumadin? No     Discharge Plan A Home with family     Discharge Plan B Home with family     DME Needed Upon Discharge  other (see comments)   TBD    Discharge Plan discussed with: Patient     Transition of Care Barriers Other (see comments)   patient has new insurance with [employer]       Physical Activity    On average, how many days per week do you engage in moderate to strenuous exercise (like a brisk walk)? Patient declined     On average, how many minutes do you engage in exercise at this level? Patient declined        Financial Resource Strain    How hard is it for you to pay for the very basics like food, housing, medical care, and heating? Not very hard        Housing Stability    In the last 12 months, was there a time when you were not able to pay the mortgage or rent on time? No     At any time in the past 12 months, were you homeless or living in a shelter (including now)? No        Transportation Needs    Has the lack of transportation kept you from medical appointments, meetings, work or from getting things needed for daily living? No        Food Insecurity    Within the  "past 12 months, you worried that your food would run out before you got the money to buy more. Never true     Within the past 12 months, the food you bought just didn't last and you didn't have money to get more. Never true        Stress    Do you feel stress - tense, restless, nervous, or anxious, or unable to sleep at night because your mind is troubled all the time - these days? Only a little        Social Isolation    How often do you feel lonely or isolated from those around you?  Rarely        Alcohol Use    Q1: How often do you have a drink containing alcohol? Never     Q2: How many drinks containing alcohol do you have on a typical day when you are drinking? Patient does not drink     Q3: How often do you have six or more drinks on one occasion? Never        Utilities    In the past 12 months has the electric, gas, oil, or water company threatened to shut off services in your home? No        Health Literacy    How often do you need to have someone help you when you read instructions, pamphlets, or other written material from your doctor or pharmacy? Never        OTHER    Name(s) of People in Home Mindi Morgan, spouse                     Readmission Assessment (most recent)       Readmission Assessment - 01/24/25 1149          Readmission    Was this a planned readmission? No     Why were you hospitalized in the last 30 days? Kidney stone     Why were you readmitted? Alarmed about signs/symptoms     When you left the hospital how did you feel? "bad"     When you left the hospital where did you go? Home with Family     Did patient/caregiver refused recommended DC plan? No     Tell me about what happened between when you left the hospital and the day you returned. felt weak very bad     Did you try to manage your symptoms your self? No     Did you call anyone? No     Did you try to see or did see a doctor or nurse before you came? No     Did you have  a follow-up appointment on discharge? Yes     Did you go? Yes "                    SW met with patient at the bedside to complete DPA.  Patient AAO x's 3 and able to verify demographics on face sheet are correct. SW name and contact number listed on the patient's white board.  SW provided explanation of discharge plan process.  Patient expressed understanding. CM dept remains available for any further patient needs or concerns.     Discharge Plan A and Plan B have been determined by review of patient's clinical status, future medical and therapeutic needs, and coverage/benefits for post-acute care in coordination with multidisciplinary team members.    Graciela Montez LMSW  [facility]  [phone]

## 2025-01-24 NOTE — ASSESSMENT & PLAN NOTE
65 y/o M with a medical hx of DM, hip fracture, GERD, HTN, HLD, and paroxysmal Afib who presented to [facility] as a transfer from [facility]. Pt originally presented to [facility] ED for R flank pain. He states he recently seen at [facility] for a kidney stone and thought pain was related to that.      CTA Abdomen showed: Large right perinephric and midline retroperitoneal hematomas with active contrast extravasation. Several areas of cortical hypoattenuation of the right kidney, suspicious for low-grade renal lacerations or infarcts.     IR consulted for emergent embolization. Concern for rupture of R gonadal artery, with concerns patient may lose R testicle. Admitted to MICU for concerns of hemorrhagic shock.     HDS upon admission. Hgb stable at 10, baseline around 14. Now s/p IR embolization.    Plan:  S/P IR embolization  Uro eval for concerns of gondal ischemia, rec monitoring and OP follow up  Will trend hemoglobin and monitor for hemodynamic instability.  Transfuse PRN  Holding anti HTN and AC meds

## 2025-01-24 NOTE — CONSULTS
"Inpatient Radiology Pre-procedure Note    History of Present Illness:  Joe Morgan is a 64 y.o. male who presents for active retroperitoneal hemorrhage.    Admission H&P reviewed.  Past Medical History:   Diagnosis Date    Diabetes mellitus     GERD (gastroesophageal reflux disease)     Hip discomfort     Hip fracture     Hyperlipidemia     Hypertension     Pancreas disorder     Spine pain      Past Surgical History:   Procedure Laterality Date    FRACTURE SURGERY         Review of Systems:   As documented in primary team H&P    Home Meds:   Prior to Admission medications    Medication Sig Start Date End Date Taking? Authorizing Provider   alprazolam (XANAX) 1 MG tablet Take 1 tablet (1 mg total) by mouth daily as needed.  Patient not taking: Reported on 1/23/2025 4/18/16   Johnnie Gibbs MD   aspirin 81 MG Chew Take 81 mg by mouth once daily.    Provider, Historical   blood sugar diagnostic Strp 1 each by Misc.(Non-Drug; Combo Route) route 4 (four) times daily. 10/21/16   Vale Gonzales APRN   buprenorphine-naloxone 8-2 mg (SUBOXONE) 8-2 mg Place 3 Film under the tongue once daily. 12/18/24   Provider, Historical   ELIQUIS 5 mg Tab Take 5 mg by mouth 2 (two) times daily. 12/23/24   Provider, Historical   flecainide (TAMBOCOR) 50 MG Tab Take 50 mg by mouth 2 (two) times daily. 12/16/24   Provider, Historical   insulin aspart (NOVOLOG) 100 unit/mL injection INJECT 20 UNITS UNDER SKIN 3 TIMES A DAY BEFORE MEALS 11/20/17   Johnnie Gibbs MD   insulin detemir (LEVEMIR FLEXTOUCH) 100 unit/mL (3 mL) SubQ InPn pen Inject 40 Units into the skin every evening. 6/26/17 6/26/18  Johnnie Gibbs MD   insulin syringe-needle U-100 0.3 mL 29 gauge x 1/2" Syrg 1 each by Misc.(Non-Drug; Combo Route) route 3 (three) times daily. 6/16/17   Johnnie Gibbs MD   lisinopril (PRINIVIL,ZESTRIL) 40 MG tablet Take 1 tablet (40 mg total) by mouth once daily. 7/2/18   Massiel Underwood, NP   metoprolol succinate (TOPROL-XL) 25 MG " "24 hr tablet Take 25 mg by mouth once daily. 7/15/24 7/15/25  Provider, Historical   ondansetron (ZOFRAN-ODT) 4 MG TbDL Take 4 mg by mouth every 6 (six) hours as needed (Nausea). 1/15/25   Provider, Historical   pen needle, diabetic (PEN NEEDLE) 31 gauge x 1/4" Ndle 1 each by Misc.(Non-Drug; Combo Route) route 3 (three) times daily. 7/17/17   Johnnie Gibbs MD   traMADoL (ULTRAM) 50 mg tablet Take 1 tablet by mouth every 6 (six) hours as needed. 1/14/25 1/24/25  Provider, Historical   doxycycline (VIBRAMYCIN) 100 MG Cap Take 1 capsule (100 mg total) by mouth 2 (two) times daily. 6/16/17 1/23/25  Johnnie Gibbs MD   fluocinonide (LIDEX) 0.05 % ointment Apply topically 2 (two) times daily. 6/16/17 1/23/25  Johnnie Gibbs MD   hydrochlorothiazide (MICROZIDE) 12.5 mg capsule Take 1 capsule (12.5 mg total) by mouth once daily. 6/16/17 1/23/25  Johnnie Gibbs MD   metFORMIN (GLUCOPHAGE) 1000 MG tablet Take 1 tablet (1,000 mg total) by mouth 2 (two) times daily. 11/20/17 1/23/25  Johnnie Gibbs MD   pravastatin (PRAVACHOL) 40 MG tablet Take 1 tablet (40 mg total) by mouth once daily. 6/16/17 1/23/25  Johnnie Gibbs MD   promethazine (PHENERGAN) 25 MG tablet TAKE 1 TABLET BY MOUTH EVERY 6 HOURS AS NEEDED FOR NAUSEA 10/17/17 1/23/25  Johnnie Gibbs MD   tadalafil (CIALIS) 20 MG Tab Take 1 tablet (20 mg total) by mouth once daily. 6/16/17 1/23/25  Johnnie Gibbs MD     Scheduled Meds:   Continuous Infusions:   PRN Meds:  Anticoagulants/Antiplatelets: aspirin and Eliquis    Allergies:   Review of patient's allergies indicates:   Allergen Reactions    Nubain [nalbuphine] Anaphylaxis    Ativan [lorazepam] Other (See Comments)     Climbs wall       Sedation Hx: have not been any systemic reactions    Labs:  No results for input(s): "INR", "PT", "PTT" in the last 168 hours.    Recent Labs   Lab 01/23/25  1700   WBC 14.58*   HGB 10.0*   HCT 30.4*   MCV 83         Recent Labs   Lab 01/23/25  1700   *   * "   K 4.9      CO2 13*   BUN 25*   CREATININE 1.4   CALCIUM 8.1*   MG 2.1   ALT 29   AST 29   ALBUMIN 2.0*   BILITOT 0.6       Vitals:        Physical Exam:  ASA: per anesthesia  Mallampati: per anesthesia    General: no acute distress  Mental Status: alert and oriented   HEENT: normocephalic, atraumatic  Chest: unlabored breathing  Abdomen: nondistended  Extremities: moves all extremities    Plan: Proceed with visceral angiogram  Sedation plan: per anesthesia      Kelly Farr MD  Diagnostic Radiology

## 2025-01-24 NOTE — PLAN OF CARE
Embolization procedure completed. Vascade closure device deployed in right femoral artery; hemostasis achieved at 2305. Patient to lay flat for 2 hours until 01:05 on 01/23/25 per MD. Right groin site clean, dry, and intact; no bleeding or hematoma noted. Patient to be transferred to PACU for post-procedural recovery per MD. Leg immobilizer can be removed once laying-flat time is complete. Report to be given at bedside to RN.

## 2025-01-24 NOTE — ASSESSMENT & PLAN NOTE
Last A1c reviewed-   Lab Results   Component Value Date    LABA1C 10.7 (H) 06/27/2018     Inpatient Antihyperglycemic Regiment:  Antihyperglycemics (From admission, onward)      Start     Stop Route Frequency Ordered    01/24/25 0900  insulin glargine U-100 (Lantus) pen 15 Units         -- SubQ Daily 01/24/25 0056    01/24/25 0715  insulin aspart U-100 pen 6 Units         -- SubQ 3 times daily with meals 01/24/25 0056    01/24/25 0155  insulin aspart U-100 pen 0-5 Units         -- SubQ Before meals & nightly PRN 01/24/25 0056          Blood Sugars (AccuCheck):    Recent Labs     01/23/25  1620   POCTGLUCOSE 297*

## 2025-01-24 NOTE — SUBJECTIVE & OBJECTIVE
Past Medical History:   Diagnosis Date    Diabetes mellitus     GERD (gastroesophageal reflux disease)     Hip discomfort     Hip fracture     Hyperlipidemia     Hypertension     Pancreas disorder     Spine pain        Past Surgical History:   Procedure Laterality Date    FRACTURE SURGERY         Review of patient's allergies indicates:   Allergen Reactions    Nubain [nalbuphine] Anaphylaxis    Ativan [lorazepam] Other (See Comments)     Climbs wall         Family History    None       Tobacco Use    Smoking status: Never    Smokeless tobacco: Current     Types: Chew   Substance and Sexual Activity    Alcohol use: No    Drug use: No    Sexual activity: Yes     Partners: Male      Review of Systems   Constitutional:  Positive for activity change and fatigue. Negative for chills and fever.   HENT:  Negative for congestion and sore throat.    Respiratory:  Negative for choking, shortness of breath and wheezing.    Cardiovascular:  Negative for chest pain and leg swelling.   Gastrointestinal:  Positive for abdominal pain and nausea. Negative for abdominal distention, constipation, diarrhea and vomiting.   Genitourinary:  Negative for dysuria and urgency.   Musculoskeletal:  Positive for back pain. Negative for arthralgias and myalgias.   Skin:  Negative for color change.   Neurological:  Positive for weakness. Negative for dizziness and headaches.   Psychiatric/Behavioral:  Negative for agitation and confusion.      Objective:     Vital Signs (Most Recent):  Temp: 98 °F (36.7 °C) (01/23/25 2345)  Pulse: 77 (01/23/25 2345)  Resp: (!) 25 (01/23/25 2345)  BP: 136/61 (01/23/25 2345)  SpO2: 100 % (01/23/25 2345) Vital Signs (24h Range):  Temp:  [97.8 °F (36.6 °C)-98.9 °F (37.2 °C)] 98 °F (36.7 °C)  Pulse:  [77-90] 77  Resp:  [18-25] 25  SpO2:  [96 %-100 %] 100 %  BP: (109-191)/() 136/61   Weight: 112.6 kg (248 lb 4.8 oz)  Body mass index is 35.63 kg/m².      Intake/Output Summary (Last 24 hours) at 1/24/2025 0026  Last  data filed at 1/23/2025 3434  Gross per 24 hour   Intake 600 ml   Output --   Net 600 ml          Physical Exam  Vitals and nursing note reviewed.   Constitutional:       Appearance: He is ill-appearing.   HENT:      Mouth/Throat:      Mouth: Mucous membranes are moist.      Pharynx: Oropharynx is clear.   Eyes:      Extraocular Movements: Extraocular movements intact.      Pupils: Pupils are equal, round, and reactive to light.      Comments: 2+ PERRL   Cardiovascular:      Rate and Rhythm: Normal rate and regular rhythm.      Pulses: Normal pulses.      Heart sounds: Normal heart sounds.      Comments: NSR  Pulmonary:      Effort: Pulmonary effort is normal.      Breath sounds: Normal breath sounds.   Abdominal:      General: There is distension.      Palpations: Abdomen is soft.      Tenderness: There is generalized abdominal tenderness.   Musculoskeletal:         General: No swelling.      Right lower leg: No edema.      Left lower leg: No edema.      Comments: R. Groin site CDI, without hematoma/bleeding   Skin:     General: Skin is warm and dry.      Capillary Refill: Capillary refill takes less than 2 seconds.      Coloration: Skin is pale.   Neurological:      General: No focal deficit present.      Mental Status: He is oriented to person, place, and time and easily aroused. Mental status is at baseline. He is lethargic.      GCS: GCS eye subscore is 3. GCS verbal subscore is 5. GCS motor subscore is 6.   Psychiatric:         Speech: Speech normal.         Behavior: Behavior is cooperative.          Vents:     Lines/Drains/Airways       Peripheral Intravenous Line  Duration                  Peripheral IV - Single Lumen 20 G Anterior;Distal;Left Forearm -- days         Peripheral IV - Single Lumen 01/23/25 18 G Right Antecubital 1 day                  Significant Labs:    CBC/Anemia Profile:  Recent Labs   Lab 01/23/25  1700   WBC 14.58*   HGB 10.0*   HCT 30.4*      MCV 83   RDW 17.4*         Chemistries:  Recent Labs   Lab 01/23/25  1700   *   K 4.9      CO2 13*   BUN 25*   CREATININE 1.4   CALCIUM 8.1*   ALBUMIN 2.0*   PROT 6.8   BILITOT 0.6   ALKPHOS 112   ALT 29   AST 29   MG 2.1       All pertinent labs within the past 24 hours have been reviewed.    Significant Imaging: I have reviewed all pertinent imaging results/findings within the past 24 hours.

## 2025-01-24 NOTE — ANESTHESIA POSTPROCEDURE EVALUATION
Anesthesia Post Evaluation    Patient: Joe Morgan    Procedure(s) Performed: Procedure(s) (LRB):  ANGIOGRAM, RENAL, BILATERAL, SELECTIVE, WITH S&I (Bilateral)    Final Anesthesia Type: general      Patient location during evaluation: PACU  Patient participation: Yes- Able to Participate  Level of consciousness: awake and alert  Post-procedure vital signs: reviewed and stable  Pain management: adequate  Airway patency: patent    PONV status at discharge: No PONV  Anesthetic complications: no      Cardiovascular status: blood pressure returned to baseline  Respiratory status: unassisted  Hydration status: euvolemic  Follow-up not needed.              Vitals Value Taken Time   /64 01/24/25 0002   Temp 36.7 °C (98 °F) 01/23/25 2345   Pulse 77 01/24/25 0010   Resp 19 01/24/25 0010   SpO2 100 % 01/24/25 0010   Vitals shown include unfiled device data.      No case tracking events are documented in the log.      Pain/Ester Score: Pain Rating Prior to Med Admin: 10 (1/23/2025  8:16 PM)  Pain Rating Post Med Admin: 5 (1/23/2025  8:23 PM)

## 2025-01-24 NOTE — ANESTHESIA PREPROCEDURE EVALUATION
[facility]  Anesthesia Pre-Operative Evaluation         Patient Name: Joe Morgan  YOB: 1960  MRN: [id number]    SUBJECTIVE:     Pre-operative evaluation for IR angiogram Visceral      01/23/2025    Joe Morgan is a 64 y.o. male w/ a significant PMHx of HTN, HLD, DM2, obesity, and chronic pain who now presented from OSH for above procedure. Of note, pt treated for renal hematoma and kidney stone one week ago and re-presented with similar pain today.    Patient now presents for the above procedure(s).      LDA:  None documented     Prev airway: None documented.    Drips: None documented.    Patient Active Problem List   Diagnosis    BMI 37.0-37.9, adult    Obesity    Type 2 diabetes mellitus    Essential hypertension       Review of patient's allergies indicates:   Allergen Reactions    Nubain [nalbuphine] Anaphylaxis    Ativan [lorazepam] Other (See Comments)     Climbs wall         Current Outpatient Medications:  No current facility-administered medications for this encounter.    Current Outpatient Medications:     alprazolam (XANAX) 1 MG tablet, Take 1 tablet (1 mg total) by mouth daily as needed. (Patient not taking: Reported on 1/23/2025), Disp: 30 tablet, Rfl: 2    aspirin 81 MG Chew, Take 81 mg by mouth once daily., Disp: , Rfl:     blood sugar diagnostic Strp, 1 each by Misc.(Non-Drug; Combo Route) route 4 (four) times daily., Disp: 120 each, Rfl: 11    buprenorphine-naloxone 8-2 mg (SUBOXONE) 8-2 mg, Place 3 Film under the tongue once daily., Disp: , Rfl:     ELIQUIS 5 mg Tab, Take 5 mg by mouth 2 (two) times daily., Disp: , Rfl:     flecainide (TAMBOCOR) 50 MG Tab, Take 50 mg by mouth 2 (two) times daily., Disp: , Rfl:     insulin aspart (NOVOLOG) 100 unit/mL injection, INJECT 20 UNITS UNDER SKIN 3 TIMES A DAY BEFORE MEALS, Disp: 40 mL, Rfl: 0    insulin detemir (LEVEMIR FLEXTOUCH) 100 unit/mL (3 mL) SubQ InPn pen, Inject 40 Units into the skin every evening.,  "Disp: 3 Box, Rfl: 5    insulin syringe-needle U-100 0.3 mL 29 gauge x 1/2" Syrg, 1 each by Misc.(Non-Drug; Combo Route) route 3 (three) times daily., Disp: 100 each, Rfl: 11    lisinopril (PRINIVIL,ZESTRIL) 40 MG tablet, Take 1 tablet (40 mg total) by mouth once daily., Disp: 30 tablet, Rfl: 0    metoprolol succinate (TOPROL-XL) 25 MG 24 hr tablet, Take 25 mg by mouth once daily., Disp: , Rfl:     ondansetron (ZOFRAN-ODT) 4 MG TbDL, Take 4 mg by mouth every 6 (six) hours as needed (Nausea)., Disp: , Rfl:     pen needle, diabetic (PEN NEEDLE) 31 gauge x 1/4" Ndle, 1 each by Misc.(Non-Drug; Combo Route) route 3 (three) times daily., Disp: 100 each, Rfl: 5    traMADoL (ULTRAM) 50 mg tablet, Take 1 tablet by mouth every 6 (six) hours as needed., Disp: , Rfl:     Facility-Administered Medications Ordered in Other Encounters:     fentaNYL 50 mcg/mL injection, , Intravenous, PRN, Battley, Jennifer A, CRNA, 25 mcg at 01/23/25 2206    LIDOcaine (PF) 20 mg/ml (2%) injection, , Intravenous, PRN, Battley, Jennifer A, CRNA, 100 mg at 01/23/25 2152    propofol (DIPRIVAN) 10 mg/mL infusion, , Intravenous, PRN, Battley, Jennifer A, CRNA, 140 mg at 01/23/25 2152    rocuronium injection, , Intravenous, PRN, Battley, Jennifer A, CRNA, 40 mg at 01/23/25 2158    sodium chloride 0.9% infusion, , Intravenous, Continuous PRN, Battley, Jennifer A, CRNA, New Bag at 01/23/25 2149    sodium chloride 0.9% infusion, , Intravenous, Continuous PRN, Battley, Jennifer A, CRNA, New Bag at 01/23/25 2151    succinylcholine injection, , Intravenous, PRN, Battley, Jennifer A, CRNA, 120 mg at 01/23/25 2152    Past Surgical History:   Procedure Laterality Date    FRACTURE SURGERY         Social History     Socioeconomic History    Marital status:    Tobacco Use    Smoking status: Never    Smokeless tobacco: Current     Types: Chew   Substance and Sexual Activity    Alcohol use: No    Drug use: No    Sexual activity: Yes     Partners: Male     Social Drivers of Health "     Financial Resource Strain: Patient Declined (1/17/2025)    Received from [facility]    Overall Financial Resource Strain (CARDIA)     Difficulty of Paying Living Expenses: Patient declined   Food Insecurity: Patient Declined (1/17/2025)    Received from [facility] and [facility]    Hunger Vital Sign     Worried About Running Out of Food in the Last Year: Patient declined     Ran Out of Food in the Last Year: Patient declined   Transportation Needs: Patient Declined (1/17/2025)    Received from [facility] and [facility]    PRAPARE - Transportation     Lack of Transportation (Medical): Patient declined     Lack of Transportation (Non-Medical): Patient declined   Physical Activity: Patient Declined (1/17/2025)    Received from [facility] and [facility]    Exercise Vital Sign     Days of Exercise per Week: Patient declined     Minutes of Exercise per Session: Patient declined   Stress: Patient Declined (1/17/2025)    Received from [facility] and [facility]    [employer] - Occupational Stress Questionnaire     Feeling of Stress : Patient declined   Housing Stability: Patient Declined (1/17/2025)    Received from [facility] and [facility]    Housing Stability Vital Sign     Unable to Pay for Housing in the Last Year: Patient declined     Number of Times Moved in the Last Year: 1     Homeless in the Last Year: Patient declined       OBJECTIVE:     Vital Signs Range (Last 24H):  Temp:  [36.6 °C (97.8 °F)-37.2 °C (98.9 °F)]   Pulse:  [78-90]   Resp:  [18-20]   BP: (109-191)/()   SpO2:  [96 %-100 %]       Significant Labs:  Lab Results   Component Value Date    WBC 14.58 (H) 01/23/2025    HGB 10.0 (L) 01/23/2025    HCT 30.4 (L) 01/23/2025     01/23/2025    CHOL 180 06/27/2018    TRIG 90 06/27/2018    HDL 53 06/27/2018    ALT 29  01/23/2025    AST 29 01/23/2025     (L) 01/23/2025    K 4.9 01/23/2025     01/23/2025    CREATININE 1.4 01/23/2025    BUN 25 (H) 01/23/2025    CO2 13 (L) 01/23/2025    MICROALBUR 10.5 06/27/2018       Diagnostic Studies: No relevant studies.    EKG:   No results found for this or any previous visit.    2D ECHO:  TTE:  No results found for this or any previous visit.    ROME:  No results found for this or any previous visit.    ASSESSMENT/PLAN:                                                                                                                  01/23/2025  Joe Morgan is a 64 y.o., male.      Pre-op Assessment    I have reviewed the Patient Summary Reports.     I have reviewed the Nursing Notes. I have reviewed the NPO Status.   I have reviewed the Medications.     Review of Systems  Anesthesia Hx:  No problems with previous Anesthesia   History of prior surgery of interest to airway management or planning:          Denies Family Hx of Anesthesia complications.    Denies Personal Hx of Anesthesia complications.                    Cardiovascular:     Hypertension   Denies MI.  Denies CAD.           hyperlipidemia                               Hepatic/GI:     GERD                Neurological:        Chronic Pain Syndrome                         Endocrine:  Diabetes               Physical Exam  General: Well nourished, Cooperative, Alert and Oriented    Airway:  Mallampati: I   Mouth Opening: Normal  TM Distance: Normal  Tongue: Normal  Neck ROM: Normal ROM    Dental:  Edentulous, Dentures    Chest/Lungs:  Clear to auscultation, Normal Respiratory Rate    Heart:  Rate: Normal  Rhythm: Regular Rhythm        Anesthesia Plan  Type of Anesthesia, risks & benefits discussed:    Anesthesia Type: Gen ETT  Intra-op Monitoring Plan: Standard ASA Monitors  Post Op Pain Control Plan: multimodal analgesia and IV/PO Opioids PRN  Induction:  IV  Airway Plan: Direct, Post-Induction  Informed Consent:  Informed consent signed with the Patient and all parties understand the risks and agree with anesthesia plan.  All questions answered.   ASA Score: 3 Emergent  Day of Surgery Review of History & Physical: H&P Update referred to the surgeon/provider.    Ready For Surgery From Anesthesia Perspective.     .

## 2025-01-24 NOTE — ASSESSMENT & PLAN NOTE
Body mass index is 35.63 kg/m². Morbid obesity complicates all aspects of disease management from diagnostic modalities to treatment. Weight loss encouraged and health benefits explained to patient.

## 2025-01-24 NOTE — CONSULTS
Food & Nutrition  Education    Diet Education: A1C  Time Spent: 2 minutes  Learners: Patient     Nutrition Education provided with handouts: Meal Planning Using the Plate Method, CHO Counting for People with Diabetes    Comments: Patient stated that they have heard the the diabetic diet education before. Patient is aware of A1C >10% and what the lab value means. Patient was ok with RD leaving education materials at bedside. All questions and concerns answered. Dietitian's contact information provided.     Follow-Up: Yes    Please Re-consult as needed    Thanks!   Danita Ayala, MS, RD, LDN

## 2025-01-24 NOTE — TRANSFER OF CARE
Anesthesia Transfer of Care Note    Patient: Joe Morgan    Procedure(s) Performed: Procedure(s) (LRB):  ANGIOGRAM, RENAL, BILATERAL, SELECTIVE, WITH S&I (Bilateral)    Patient location: ICU    Anesthesia Type: general    Transport from OR: Transported from OR on 6-10 L/min O2 by face mask with adequate spontaneous ventilation. Continuous ECG monitoring in transport. Continuous SpO2 monitoring in transport    Post pain: adequate analgesia    Post assessment: no apparent anesthetic complications    Post vital signs: stable    Level of consciousness: awake and alert    Nausea/Vomiting: no nausea/vomiting    Complications: none    Transfer of care protocol was followed      Last vitals: Visit Vitals  /61   Pulse 77   Temp 36.7 °C (98 °F) (Axillary)   Resp (!) 25   Wt 112.6 kg (248 lb 4.8 oz)   SpO2 100%   BMI 35.63 kg/m²

## 2025-01-24 NOTE — SUBJECTIVE & OBJECTIVE
Interval History: AFVSS. C/o abdominal pain. Not well controlled. Hgb 7.1 from 7.0  No gross hematuria  NPO    Review of Systems  Objective:     Temp:  [97.8 °F (36.6 °C)-98.9 °F (37.2 °C)] 98.1 °F (36.7 °C)  Pulse:  [72-90] 86  Resp:  [18-31] 22  SpO2:  [94 %-100 %] 98 %  BP: (109-191)/() 161/70     Body mass index is 35.63 kg/m².           Drains       None                    Physical Exam  Constitutional:       General: He is not in acute distress.     Appearance: Normal appearance.   HENT:      Head: Normocephalic and atraumatic.   Eyes:      Extraocular Movements: Extraocular movements intact.      Pupils: Pupils are equal, round, and reactive to light.   Cardiovascular:      Rate and Rhythm: Normal rate.   Pulmonary:      Effort: Pulmonary effort is normal. No respiratory distress.   Abdominal:      General: Abdomen is flat. There is no distension.      Tenderness: There is abdominal tenderness. There is no right CVA tenderness or left CVA tenderness.   Genitourinary:     Comments: Normal appearing testicles bilaterally. Circumcised   Skin:     Coloration: Skin is not jaundiced.   Neurological:      General: No focal deficit present.      Mental Status: He is alert and oriented to person, place, and time.   Psychiatric:         Mood and Affect: Mood normal.         Behavior: Behavior normal.           Significant Labs:    BMP:  Recent Labs   Lab 01/23/25  1700 01/24/25  0349   * 131*   K 4.9 6.0*    106   CO2 13* 16*   BUN 25* 26*   CREATININE 1.4 1.1   CALCIUM 8.1* 7.5*       CBC:   Recent Labs   Lab 01/23/25  1700 01/24/25  0041 01/24/25  0349   WBC 14.58* 13.20* 13.30*   HGB 10.0* 7.0* 7.1*   HCT 30.4* 22.0* 22.1*    279 269       All pertinent labs results from the past 24 hours have been reviewed.    Significant Imaging:  All pertinent imaging results/findings from the past 24 hours have been reviewed.

## 2025-01-24 NOTE — PROGRESS NOTES
"[facility] ICU  Critical Care Medicine  Progress Note    Patient Name: Joe Morgan  MRN: [id number]  Admission Date: 1/23/2025  Hospital Length of Stay: 1 days  Code Status: Full Code  Attending Provider: Matthew Valdivia*  Primary Care Provider: Johnnie Gibbs MD   Principal Problem: Retroperitoneal hematoma    Subjective:     HPI:  63 y/o M with a medical hx of DM, hip fracture, GERD, HTN, HLD, and paroxysmal Afib who presented to [facility] as a transfer from [facility]. Pt originally presented to [facility] for R flank pain. He states he recently seen at [facility] for a kidney stone and thought pain was related to that.      Hospital Course from [facility]:  "Admitted and treated for UTI and hematoma of the right kidney after he presented with complaints of left flank pain and subjective fever. He had a CT of his abdomen and pelvis that was significant for right kidney pyleonephritis, trace right lung base pleural effusion. He was started on IVF and antibiotics. He continued to have pain and repeat CT was obtained and significant for GB distension, small pleural effusions, left inguinal hernia and interval development of subcapsular hematoma in the right kidney. He had follow up imaging the next day and had slight interval evolution of right subcapsular hemorrhage. He was on eliquis for afib which was stopped and his H/H was monitored. His imaging was also reviewed by Dr. Pina with urology for possible transfer but he recommended the patient continued to be monitored here. Did require 2 units of pRBC and blood cell counts have stabilized. The case was discussed with Dr. Pina who has agreed the patient is stable for discharge. Plan for follow up CT in 1 week and follow up with Dr. Pina after it is complete. Pt was deemed medically stable on day of discharge."    Patient states his pain immediately returned after being released. Pain located in the R lower back " radiating around the R flank and into the R testicle. It is constant, stabbing, severe in nature. Nothing makes it better or worse. He took no medications prior to arrival treat pain. Denies any symptoms on review of systems at this time.    CTA Abdomen showed: Large right perinephric and midline retroperitoneal hematomas with active contrast extravasation. Several areas of cortical hypoattenuation of the right kidney, suspicious for low-grade renal lacerations or infarcts.     IR consulted for emergent embolization. Concern for rupture of R gonadal artery, with concerns patient may lose R testicle. Admitted to MICU for concerns of hemorrhagic shock. Uro consulted      Hospital/ICU Course:  No notes on file    Interval History/Significant Events: NAOE stable H and H, one call for pain control    Review of Systems  Objective:     Vital Signs (Most Recent):  Temp: 99.1 °F (37.3 °C) (01/24/25 1100)  Pulse: 80 (01/24/25 1200)  Resp: (!) 22 (01/24/25 1200)  BP: (!) 145/65 (01/24/25 1200)  SpO2: 97 % (01/24/25 1200) Vital Signs (24h Range):  Temp:  [97.8 °F (36.6 °C)-99.1 °F (37.3 °C)] 99.1 °F (37.3 °C)  Pulse:  [] 80  Resp:  [18-31] 22  SpO2:  [92 %-100 %] 97 %  BP: (109-191)/() 145/65   Weight: 112.6 kg (248 lb 4.8 oz)  Body mass index is 35.63 kg/m².      Intake/Output Summary (Last 24 hours) at 1/24/2025 1307  Last data filed at 1/24/2025 0641  Gross per 24 hour   Intake 1080 ml   Output 900 ml   Net 180 ml          Physical Exam  Vitals and nursing note reviewed.   Constitutional:       Appearance: He is well-developed.   HENT:      Head: Normocephalic.      Mouth/Throat:      Mouth: Mucous membranes are moist.   Eyes:      Pupils: Pupils are equal, round, and reactive to light.   Cardiovascular:      Rate and Rhythm: Normal rate and regular rhythm.      Pulses: Normal pulses.   Pulmonary:      Effort: Pulmonary effort is normal.   Abdominal:      General: Bowel sounds are normal. There is no distension.  "     Palpations: Abdomen is soft.      Tenderness: There is no abdominal tenderness.   Musculoskeletal:         General: Swelling present.      Comments: Swelling right groin at incision site.    Skin:     General: Skin is warm and dry.      Capillary Refill: Capillary refill takes less than 2 seconds.   Neurological:      Mental Status: He is alert and oriented to person, place, and time.   Psychiatric:         Mood and Affect: Mood normal.         Behavior: Behavior normal.         Thought Content: Thought content normal.         Judgment: Judgment normal.            Vents:     Lines/Drains/Airways       Peripheral Intravenous Line  Duration                  Peripheral IV - Single Lumen 20 G Anterior;Distal;Left Forearm -- days         Peripheral IV - Single Lumen 01/23/25 18 G Right Antecubital 1 day                  Significant Labs:    CBC/Anemia Profile:  Recent Labs   Lab 01/23/25  1700 01/24/25  0041 01/24/25  0349   WBC 14.58* 13.20* 13.30*   HGB 10.0* 7.0* 7.1*   HCT 30.4* 22.0* 22.1*    279 269   MCV 83 85 84   RDW 17.4* 17.6* 17.8*        Chemistries:  Recent Labs   Lab 01/23/25  1700 01/24/25  0349 01/24/25  0512   * 131* 134*   K 4.9 6.0* 5.4*    106 107   CO2 13* 16* 18*   BUN 25* 26* 26*   CREATININE 1.4 1.1 1.2   CALCIUM 8.1* 7.5* 7.6*   ALBUMIN 2.0* 1.7*  --    PROT 6.8 5.9*  --    BILITOT 0.6 0.4  --    ALKPHOS 112 84  --    ALT 29 20  --    AST 29 18  --    MG 2.1 2.1  --    PHOS  --  4.8*  --        All pertinent labs within the past 24 hours have been reviewed.    Significant Imaging:  I have reviewed all pertinent imaging results/findings within the past 24 hours.    ABG  No results for input(s): "PH", "PO2", "PCO2", "HCO3", "BE" in the last 168 hours.  Assessment/Plan:     Cardiac/Vascular  Paroxysmal atrial fibrillation  Hx of paroxysmal Afib. Currently in NSR.     Plan:  - Maintain K > 4, Mag > 2 and Ca/iCal WNL to decrease arrhythmogenic potential  - Rate control with " metoprolol succinate 25 mg  - Emr chart review shows both metoprolol and Flecainide at home?? Pt unsure which if any he is taking  - Anticoagulation with Eliquis at home, reports hasn't taken any within past 2 days. Will hold in setting of retroperitoneal bleed  - Maintain on telemetry     Essential hypertension  Takes lisinopril 40 mg and metoprolol succinate 25 mg as home medications. HDS upon admission and s/p embolization.     - will hold lisinopril in setting of LAUREN    Renal/  LAUREN (acute kidney injury)  Creatinine 1.4 on admit, baseline around 0.8    Plan:   Lab Results   Component Value Date    CREATININE 1.4 01/23/2025     Likely 2/2 blood loss in setting of ruptured R gonadal artery.     Plan:  - Strict I&Os and daily weights   - Gentle IVF  - Avoid nephrotoxic agents such as NSAIDs, gadolinium and IV radiocontrast.  - Renally dose meds to current GFR.  - Maintain MAP > 65.  -trend H and H if decreased get CTA    Endocrine  Type 2 diabetes mellitus  Last A1c reviewed-   Lab Results   Component Value Date    LABA1C 10.7 (H) 06/27/2018     Inpatient Antihyperglycemic Regiment:  Antihyperglycemics (From admission, onward)      Start     Stop Route Frequency Ordered    01/24/25 0900  insulin glargine U-100 (Lantus) pen 15 Units         -- SubQ Daily 01/24/25 0056    01/24/25 0715  insulin aspart U-100 pen 6 Units         -- SubQ 3 times daily with meals 01/24/25 0056    01/24/25 0155  insulin aspart U-100 pen 0-5 Units         -- SubQ Before meals & nightly PRN 01/24/25 0056          Blood Sugars (AccuCheck):    Recent Labs     01/23/25  1620   POCTGLUCOSE 297*       Obesity  Body mass index is 35.63 kg/m². Morbid obesity complicates all aspects of disease management from diagnostic modalities to treatment. Weight loss encouraged and health benefits explained to patient.     GI  * Retroperitoneal hematoma  65 y/o M with a medical hx of DM, hip fracture, GERD, HTN, HLD, and paroxysmal Afib who presented to [facility] [place] as a transfer from [facility]. Pt originally presented to [facility] ED for R flank pain. He states he recently seen at [facility] for a kidney stone and thought pain was related to that.      CTA Abdomen showed: Large right perinephric and midline retroperitoneal hematomas with active contrast extravasation. Several areas of cortical hypoattenuation of the right kidney, suspicious for low-grade renal lacerations or infarcts.     IR consulted for emergent embolization. Concern for rupture of R gonadal artery, with concerns patient may lose R testicle. Admitted to MICU for concerns of hemorrhagic shock.     HDS upon admission. Hgb stable at 10, baseline around 14. Now s/p IR embolization.    Plan:  S/P IR embolization  Uro eval for concerns of gondal ischemia, rec monitoring and OP follow up  Will trend hemoglobin and monitor for hemodynamic instability.  Transfuse PRN  Holding anti HTN and AC meds        Critical Care Daily Checklist:    A: Awake: RASS Goal/Actual Goal:    Actual:     B: Spontaneous Breathing Trial Performed?     C: SAT & SBT Coordinated?  Not intubated                      D: Delirium: CAM-ICU     E: Early Mobility Performed? Yes   F: Feeding Goal:    Status:     Current Diet Order   Procedures    Diet Adult Regular      AS: Analgesia/Sedation Buprenorphine and dilaudid   T: Thromboembolic Prophylaxis Held due to bleeding risk   H: HOB > 300 Yes   U: Stress Ulcer Prophylaxis (if needed) none   G: Glucose Control Home insulin   B: Bowel Function     I: Indwelling Catheter (Lines & Hood) Necessity none   D: De-escalation of Antimicrobials/Pharmacotherapies none    Plan for the day/ETD Step down tomorrow     Code Status:  Family/Goals of Care: Full Code         Critical secondary to Patient has a condition that poses threat to life and bodily function: Acute Renal Failure      Critical care was time spent personally by me on the following activities: development of treatment plan with  patient or surrogate and bedside caregivers, discussions with consultants, evaluation of patient's response to treatment, examination of patient, ordering and performing treatments and interventions, ordering and review of laboratory studies, ordering and review of radiographic studies, pulse oximetry, re-evaluation of patient's condition. This critical care time did not overlap with that of any other provider or involve time for any procedures.     Sadi Stratton MD  Critical Care Medicine  [facility]

## 2025-01-24 NOTE — ED TRIAGE NOTES
Joe Morgan, a 64 y.o. male presents to the ED w/ complaint of transfer. Patient transferred from [place] via flight for retroperitoneal hematoma and for IR services    Triage note:  Chief Complaint   Patient presents with    Transfer     Pt transferred from [facility] via [facility] for retroperitoneal hematoma.      Review of patient's allergies indicates:   Allergen Reactions    Nubain [nalbuphine] Anaphylaxis    Ativan [lorazepam] Other (See Comments)     Climbs wall       Past Medical History:   Diagnosis Date    Diabetes mellitus     GERD (gastroesophageal reflux disease)     Hip discomfort     Hip fracture     Hyperlipidemia     Hypertension     Pancreas disorder     Spine pain

## 2025-01-24 NOTE — PROGRESS NOTES
[facility]  Urology  Progress Note    Patient Name: Joe Morgan  MRN: [id number]  Admission Date: 1/23/2025  Hospital Length of Stay: 1 days  Code Status: Full Code   Attending Provider: Matthew Valdivia*   Primary Care Physician: Johnnie Gibbs MD    Subjective:     HPI:  Joe Morgan is a 64 y.o. male with a PMHx of pAF, HTN and T2DM presenting as a transfer from outside ED due to retroperitoneal hematomas with active contrast extravasation on CTA. Underwent angiogram and right gonadal artery embolization with IR. Urology consulted for RP bleed and concerns for testicular atrophy s/p embolization.    On assessment the patient is AFVSS. States that he originally presented [facility] ED for right flank pain and CTA found to have right perinephric and retroperitoneal hematoma. Also passed a presumed kidney stone in the ED yesterday. Has never seen a urologist in the past. Denies fevers, chills, hematuria.     WBC 14.58, Hg at 10.0, received 1 upRBC at OSH. Cr is 1.4 (baseline appears 0.9-1.3). UA nitrite negative, 6 RBC, 47 WBC and few bacteria with 2 squamous cells.     CT GI bleed showed contrast in the collecting system, no obvious hydronephrosis, or stone, right perinephric and retroperitoneal hematoma present.             Interval History: AFVSS. C/o abdominal pain. Not well controlled. Hgb 7.1 from 7.0  No gross hematuria  NPO    Review of Systems  Objective:     Temp:  [97.8 °F (36.6 °C)-98.9 °F (37.2 °C)] 98.1 °F (36.7 °C)  Pulse:  [72-90] 86  Resp:  [18-31] 22  SpO2:  [94 %-100 %] 98 %  BP: (109-191)/() 161/70     Body mass index is 35.63 kg/m².           Drains       None                    Physical Exam  Constitutional:       General: He is not in acute distress.     Appearance: Normal appearance.   HENT:      Head: Normocephalic and atraumatic.   Eyes:      Extraocular Movements: Extraocular movements intact.      Pupils: Pupils are equal, round, and reactive to light.    Cardiovascular:      Rate and Rhythm: Normal rate.   Pulmonary:      Effort: Pulmonary effort is normal. No respiratory distress.   Abdominal:      General: Abdomen is flat. There is no distension.      Tenderness: There is abdominal tenderness. Bilateral flank pain.   Genitourinary:     Comments: Normal appearing testicles bilaterally. Circumcised   Skin:     Coloration: Skin is not jaundiced.   Neurological:      General: No focal deficit present.      Mental Status: He is alert and oriented to person, place, and time.   Psychiatric:         Mood and Affect: Mood normal.         Behavior: Behavior normal.           Significant Labs:    BMP:  Recent Labs   Lab 01/23/25  1700 01/24/25  0349   * 131*   K 4.9 6.0*    106   CO2 13* 16*   BUN 25* 26*   CREATININE 1.4 1.1   CALCIUM 8.1* 7.5*       CBC:   Recent Labs   Lab 01/23/25  1700 01/24/25  0041 01/24/25  0349   WBC 14.58* 13.20* 13.30*   HGB 10.0* 7.0* 7.1*   HCT 30.4* 22.0* 22.1*    279 269       All pertinent labs results from the past 24 hours have been reviewed.    Significant Imaging:  All pertinent imaging results/findings from the past 24 hours have been reviewed.                  Assessment/Plan:     * Retroperitoneal hematoma  Joe Morgan is a 64 y.o. male with a PMHx of pAF, HTN and T2DM presenting as a transfer from outside ED due to retroperitoneal hematomas with active contrast extravasation on CTA. Underwent angiogram and right gonadal artery embolization with IR. Urology consulted for RP bleed and concerns for testicular atrophy s/p embolization.    -- Embolization of gonadal unlikely to cause testicular atrophy due collateral blood supply  -- Serial Hgb  -- Transfuse as necessary to maintain Hg >7.0.  -- If patient decompensates, or requires mass transfusions may require repeat embolization.   -- NPO. If Hgb stable at next cbc okay for a diet  -- Hold AC/AP as feasible.   -- Will need outpatient referral to Urology to  establish care in [place]. Will need abdominal imaging to assess for renal mass        VTE Risk Mitigation (From admission, onward)           Ordered     Reason for No Pharmacological VTE Prophylaxis  Once        Question:  Reasons:  Answer:  Active Bleeding    01/23/25 2338     Place sequential compression device  Until discontinued         01/23/25 2338     IP VTE HIGH RISK PATIENT  Once         01/23/25 2318     Place sequential compression device  Until discontinued         01/23/25 2318                    Gagandeep Orr MD  Urology  [facility] - Medical ICU

## 2025-01-24 NOTE — ASSESSMENT & PLAN NOTE
Hx of paroxysmal Afib. Currently in NSR.     Plan:  - Maintain K > 4, Mag > 2 and Ca/iCal WNL to decrease arrhythmogenic potential  - Rate control with metoprolol succinate 25 mg  - Emr chart review shows both metoprolol and Flecainide at home?? Pt unsure which if any he is taking  - Anticoagulation with Eliquis at home, reports hasn't taken any within past 2 days. Will hold in setting of retroperitoneal bleed  - Maintain on telemetry

## 2025-01-24 NOTE — SUBJECTIVE & OBJECTIVE
Past Medical History:   Diagnosis Date    Diabetes mellitus     GERD (gastroesophageal reflux disease)     Hip discomfort     Hip fracture     Hyperlipidemia     Hypertension     Pancreas disorder     Spine pain        Past Surgical History:   Procedure Laterality Date    FRACTURE SURGERY         Review of patient's allergies indicates:   Allergen Reactions    Nubain [nalbuphine] Anaphylaxis    Ativan [lorazepam] Other (See Comments)     Climbs wall         Family History    None         Tobacco Use    Smoking status: Never    Smokeless tobacco: Current     Types: Chew   Substance and Sexual Activity    Alcohol use: No    Drug use: No    Sexual activity: Yes     Partners: Male       Review of Systems   Constitutional:  Negative for chills and fever.   Genitourinary:  Negative for difficulty urinating and hematuria.       Objective:     Temp:  [97.8 °F (36.6 °C)-98.9 °F (37.2 °C)] 98 °F (36.7 °C)  Pulse:  [77-90] 77  Resp:  [18-25] 25  SpO2:  [96 %-100 %] 100 %  BP: (109-191)/() 136/61  Weight: 112.6 kg (248 lb 4.8 oz)  Body mass index is 35.63 kg/m².           Drains       None                    Physical Exam  Constitutional:       General: He is not in acute distress.     Appearance: Normal appearance.   HENT:      Head: Normocephalic and atraumatic.   Eyes:      Extraocular Movements: Extraocular movements intact.      Pupils: Pupils are equal, round, and reactive to light.   Cardiovascular:      Rate and Rhythm: Normal rate.   Pulmonary:      Effort: Pulmonary effort is normal. No respiratory distress.   Abdominal:      General: Abdomen is flat. There is no distension.      Tenderness: There is abdominal tenderness. There is no right CVA tenderness or left CVA tenderness.   Genitourinary:     Comments: Normal appearing testicles bilaterally. Circumcised   Skin:     Coloration: Skin is not jaundiced.   Neurological:      General: No focal deficit present.      Mental Status: He is alert and oriented to  "person, place, and time.   Psychiatric:         Mood and Affect: Mood normal.         Behavior: Behavior normal.          Significant Labs:    BMP:  Recent Labs   Lab 01/23/25  1700   *   K 4.9      CO2 13*   BUN 25*   CREATININE 1.4   CALCIUM 8.1*       CBC:  Recent Labs   Lab 01/23/25  1700   WBC 14.58*   HGB 10.0*   HCT 30.4*          Blood Culture: No results for input(s): "LABBLOO" in the last 168 hours.  CMP:   Recent Labs   Lab 01/23/25  1700   *   *   K 4.9      CO2 13*   BUN 25*   CREATININE 1.4   CALCIUM 8.1*   MG 2.1     Urine Culture: No results for input(s): "LABURIN" in the last 168 hours.  Urine Studies:   Recent Labs   Lab 01/23/25  1706   COLORU Yellow   APPEARANCEUA Hazy*   PHUR 6.0   SPECGRAV >1.030*   PROTEINUA 2+*   GLUCUA 4+*   KETONESU Negative   BILIRUBINUA Negative   OCCULTUA 3+*   NITRITE Negative   UROBILINOGEN 2.0-3.0*   LEUKOCYTESUR Trace*   RBCUA 6*   WBCUA 47*   BACTERIA Few*   SQUAMEPITHEL 2   HYALINECASTS 0       Significant Imaging:  CT: I have reviewed all results within the past 24 hours and my personal findings are:  as noted in HPI                   "

## 2025-01-24 NOTE — CONSULTS
[facility]  Urology  Consult Note    Patient Name: Joe Morgan  MRN: [id number]  Admission Date: 1/23/2025  Hospital Length of Stay: 1   Code Status: Full Code   Attending Provider: Matthew Valdivia*   Consulting Provider: Frederick Villaseñor DO  Primary Care Physician: Johnnie Gibbs MD  Principal Problem:Retroperitoneal hematoma    Inpatient consult to Urology  Consult performed by: Frederick Villaseñor DO  Consult ordered by: Acosta Reid DO  Reason for consult: RP bleed s/p embolization          Subjective:     HPI:  Joe Morgan is a 64 y.o. male with a PMHx of pAF, HTN and T2DM presenting as a transfer from outside ED due to retroperitoneal hematomas with active contrast extravasation on CTA. Underwent angiogram and right gonadal artery embolization with IR. Urology consulted for RP bleed and concerns for testicular atrophy s/p embolization.    On assessment the patient is AFVSS. States that he originally presented [facility] ED for right flank pain and CTA found to have right perinephric and retroperitoneal hematoma. Also passed a presumed kidney stone in the ED yesterday. Has never seen a urologist in the past. Denies fevers, chills, hematuria.     WBC 14.58, Hg at 10.0, received 1 upRBC at OSH. Cr is 1.4 (baseline appears 0.9-1.3). UA nitrite negative, 6 RBC, 47 WBC and few bacteria with 2 squamous cells.     CT GI bleed showed contrast in the collecting system, no obvious hydronephrosis, or stone, right perinephric and retroperitoneal hematoma present.             Past Medical History:   Diagnosis Date    Diabetes mellitus     GERD (gastroesophageal reflux disease)     Hip discomfort     Hip fracture     Hyperlipidemia     Hypertension     Pancreas disorder     Spine pain        Past Surgical History:   Procedure Laterality Date    FRACTURE SURGERY         Review of patient's allergies indicates:   Allergen Reactions    Nubain [nalbuphine] Anaphylaxis    Ativan [lorazepam] Other (See  "Comments)     Climbs wall         Family History    None         Tobacco Use    Smoking status: Never    Smokeless tobacco: Current     Types: Chew   Substance and Sexual Activity    Alcohol use: No    Drug use: No    Sexual activity: Yes     Partners: Male       Review of Systems   Constitutional:  Negative for chills and fever.   Genitourinary:  Negative for difficulty urinating and hematuria.       Objective:     Temp:  [97.8 °F (36.6 °C)-98.9 °F (37.2 °C)] 98 °F (36.7 °C)  Pulse:  [77-90] 77  Resp:  [18-25] 25  SpO2:  [96 %-100 %] 100 %  BP: (109-191)/() 136/61  Weight: 112.6 kg (248 lb 4.8 oz)  Body mass index is 35.63 kg/m².           Drains       None                    Physical Exam  Constitutional:       General: He is not in acute distress.     Appearance: Normal appearance.   HENT:      Head: Normocephalic and atraumatic.   Eyes:      Extraocular Movements: Extraocular movements intact.      Pupils: Pupils are equal, round, and reactive to light.   Cardiovascular:      Rate and Rhythm: Normal rate.   Pulmonary:      Effort: Pulmonary effort is normal. No respiratory distress.   Abdominal:      General: Abdomen is flat. There is no distension.      Tenderness: There is abdominal tenderness. There is no right CVA tenderness or left CVA tenderness.   Genitourinary:     Comments: Normal appearing testicles bilaterally. Circumcised   Skin:     Coloration: Skin is not jaundiced.   Neurological:      General: No focal deficit present.      Mental Status: He is alert and oriented to person, place, and time.   Psychiatric:         Mood and Affect: Mood normal.         Behavior: Behavior normal.          Significant Labs:    BMP:  Recent Labs   Lab 01/23/25  1700   *   K 4.9      CO2 13*   BUN 25*   CREATININE 1.4   CALCIUM 8.1*       CBC:  Recent Labs   Lab 01/23/25  1700   WBC 14.58*   HGB 10.0*   HCT 30.4*          Blood Culture: No results for input(s): "LABBLOO" in the last 168 " "hours.  CMP:   Recent Labs   Lab 01/23/25  1700   *   *   K 4.9      CO2 13*   BUN 25*   CREATININE 1.4   CALCIUM 8.1*   MG 2.1     Urine Culture: No results for input(s): "LABURIN" in the last 168 hours.  Urine Studies:   Recent Labs   Lab 01/23/25  1706   COLORU Yellow   APPEARANCEUA Hazy*   PHUR 6.0   SPECGRAV >1.030*   PROTEINUA 2+*   GLUCUA 4+*   KETONESU Negative   BILIRUBINUA Negative   OCCULTUA 3+*   NITRITE Negative   UROBILINOGEN 2.0-3.0*   LEUKOCYTESUR Trace*   RBCUA 6*   WBCUA 47*   BACTERIA Few*   SQUAMEPITHEL 2   HYALINECASTS 0       Significant Imaging:  CT: I have reviewed all results within the past 24 hours and my personal findings are:  as noted in HPI                     Assessment and Plan:     * Retroperitoneal hematoma  Joe Morgan is a 64 y.o. male with a PMHx of pAF, HTN and T2DM presenting as a transfer from outside ED due to retroperitoneal hematomas with active contrast extravasation on CTA. Underwent angiogram and right gonadal artery embolization with IR. Urology consulted for RP bleed and concerns for testicular atrophy s/p embolization.    -- Embolization of gonadal unlikely to cause testicular atrophy due collateral blood supply  -- Trend Hg, transfuse as necessary to maintain Hg >7.0.  -- If patient decompensates, or requires mass transfusions may require repeat embolization.   -- NPO until Hg stabilizes   -- Strict bed rest for 24-48 hours.  -- Hold AC/AP as feasible.   -- Will need outpatient referral to Urology to establish care in [place].   -- Urology to follow       VTE Risk Mitigation (From admission, onward)           Ordered     Reason for No Pharmacological VTE Prophylaxis  Once        Question:  Reasons:  Answer:  Active Bleeding    01/23/25 2338     Place sequential compression device  Until discontinued         01/23/25 2338     IP VTE HIGH RISK PATIENT  Once         01/23/25 2318     Place sequential compression device  Until discontinued      "    01/23/25 6281                    Thank you for your consult. I will follow-up with patient. Please contact us if you have any additional questions.    Frederick Villaseñor, DO  Urology  [facility] - [facility]

## 2025-01-24 NOTE — ASSESSMENT & PLAN NOTE
Joe Morgan is a 64 y.o. male with a PMHx of pAF, HTN and T2DM presenting as a transfer from outside ED due to retroperitoneal hematomas with active contrast extravasation on CTA. Underwent angiogram and right gonadal artery embolization with IR. Urology consulted for RP bleed and concerns for testicular atrophy s/p embolization.    -- Embolization of gonadal unlikely to cause testicular atrophy due collateral blood supply  -- Serial Hgb  -- Transfuse as necessary to maintain Hg >7.0.  -- If patient decompensates, or requires mass transfusions may require repeat embolization.   -- NPO. If Hgb stable at next cbc okay for a diet  -- Hold AC/AP as feasible.   -- Will need outpatient referral to Urology to establish care in [place]. Will need abdominal imaging to assess for renal mass

## 2025-01-24 NOTE — PROCEDURES
Radiology Post-Procedure Note    Pre Op Diagnosis: Right subcapsular renal hemorrhage and RP hemorrahge  Post Op Diagnosis: Same    Procedure: Agniogram and embolization    Procedure performed by: Evangelista Stevens MD    Written Informed Consent Obtained: Yes  Specimen Removed: NO  Estimated Blood Loss: Minimal    Findings:   Via rt cfa, right testicular, accessory right renal, right renal and branches selected and angiograms obtained.     Active extravasation from small branch of right testicular artery noted which correlates with CTA findings. Despite several attempts, given diminutive caliber of vessel, it could not be selected for embolization. At this time, pt's wife was called and discussed that in order to stop bleeding, embolization of testicular artery would need to be performed. This can result in loss of the testicle. Pt's wife agreed to proceed with embolization to stop the bleeding.    Interrogation of right renal arteries did not demonstrate active extravasation and therefore no embolization was performed.     Case was d/w Dr. Olivia Clifford and ICU resident. Rec Urology consultation to follow up for possible testicular ischemia/necrosis.     Rec holding AC for now and close monitoring to ensure no right renal bleeding.    The pt and his wife were updated at the end of the procedure.     Patient tolerated procedure well. No complications.    Evangelista Stevens M.D.  Interventional Radiology  Department of Radiology

## 2025-01-24 NOTE — CONSULTS
MICU consulted for patient presenting with retroperitoneal bleed requiring emergent IR embolization. Pt will be admitted to MICU for higher level of care. Full H&P to follow.

## 2025-01-25 PROBLEM — R14.0 ABDOMINAL DISTENTION: Status: ACTIVE | Noted: 2025-01-25

## 2025-01-25 PROBLEM — N17.9 AKI (ACUTE KIDNEY INJURY): Status: RESOLVED | Noted: 2025-01-24 | Resolved: 2025-01-25

## 2025-01-25 LAB
ALBUMIN SERPL BCP-MCNC: 1.7 G/DL (ref 3.5–5.2)
ALP SERPL-CCNC: 81 U/L (ref 40–150)
ALT SERPL W/O P-5'-P-CCNC: 17 U/L (ref 10–44)
ANION GAP SERPL CALC-SCNC: 7 MMOL/L (ref 8–16)
AST SERPL-CCNC: 15 U/L (ref 10–40)
BILIRUB SERPL-MCNC: 0.5 MG/DL (ref 0.1–1)
BUN SERPL-MCNC: 21 MG/DL (ref 8–23)
CALCIUM SERPL-MCNC: 8.2 MG/DL (ref 8.7–10.5)
CHLORIDE SERPL-SCNC: 109 MMOL/L (ref 95–110)
CO2 SERPL-SCNC: 19 MMOL/L (ref 23–29)
CREAT SERPL-MCNC: 0.9 MG/DL (ref 0.5–1.4)
ERYTHROCYTE [DISTWIDTH] IN BLOOD BY AUTOMATED COUNT: 17 % (ref 11.5–14.5)
ERYTHROCYTE [DISTWIDTH] IN BLOOD BY AUTOMATED COUNT: 17.1 % (ref 11.5–14.5)
ERYTHROCYTE [DISTWIDTH] IN BLOOD BY AUTOMATED COUNT: 17.2 % (ref 11.5–14.5)
EST. GFR  (NO RACE VARIABLE): >60 ML/MIN/1.73 M^2
GLUCOSE SERPL-MCNC: 189 MG/DL (ref 70–110)
HCT VFR BLD AUTO: 22.3 % (ref 40–54)
HCT VFR BLD AUTO: 22.4 % (ref 40–54)
HCT VFR BLD AUTO: 22.8 % (ref 40–54)
HGB BLD-MCNC: 7.4 G/DL (ref 14–18)
HGB BLD-MCNC: 7.4 G/DL (ref 14–18)
HGB BLD-MCNC: 7.5 G/DL (ref 14–18)
MAGNESIUM SERPL-MCNC: 2.4 MG/DL (ref 1.6–2.6)
MCH RBC QN AUTO: 28.2 PG (ref 27–31)
MCH RBC QN AUTO: 28.2 PG (ref 27–31)
MCH RBC QN AUTO: 28.4 PG (ref 27–31)
MCHC RBC AUTO-ENTMCNC: 32.9 G/DL (ref 32–36)
MCHC RBC AUTO-ENTMCNC: 33 G/DL (ref 32–36)
MCHC RBC AUTO-ENTMCNC: 33.2 G/DL (ref 32–36)
MCV RBC AUTO: 85 FL (ref 82–98)
MCV RBC AUTO: 86 FL (ref 82–98)
MCV RBC AUTO: 86 FL (ref 82–98)
PHOSPHATE SERPL-MCNC: 3.2 MG/DL (ref 2.7–4.5)
PLATELET # BLD AUTO: 327 K/UL (ref 150–450)
PLATELET # BLD AUTO: 327 K/UL (ref 150–450)
PLATELET # BLD AUTO: 362 K/UL (ref 150–450)
PMV BLD AUTO: 9.1 FL (ref 9.2–12.9)
PMV BLD AUTO: 9.4 FL (ref 9.2–12.9)
PMV BLD AUTO: 9.4 FL (ref 9.2–12.9)
POCT GLUCOSE: 166 MG/DL (ref 70–110)
POCT GLUCOSE: 187 MG/DL (ref 70–110)
POCT GLUCOSE: 189 MG/DL (ref 70–110)
POCT GLUCOSE: 247 MG/DL (ref 70–110)
POTASSIUM SERPL-SCNC: 4.6 MMOL/L (ref 3.5–5.1)
PROT SERPL-MCNC: 6.2 G/DL (ref 6–8.4)
RBC # BLD AUTO: 2.61 M/UL (ref 4.6–6.2)
RBC # BLD AUTO: 2.62 M/UL (ref 4.6–6.2)
RBC # BLD AUTO: 2.66 M/UL (ref 4.6–6.2)
SODIUM SERPL-SCNC: 135 MMOL/L (ref 136–145)
WBC # BLD AUTO: 10.41 K/UL (ref 3.9–12.7)
WBC # BLD AUTO: 11.45 K/UL (ref 3.9–12.7)
WBC # BLD AUTO: 8.98 K/UL (ref 3.9–12.7)

## 2025-01-25 PROCEDURE — 99223 1ST HOSP IP/OBS HIGH 75: CPT | Mod: ,,, | Performed by: UROLOGY

## 2025-01-25 PROCEDURE — 25000003 PHARM REV CODE 250

## 2025-01-25 PROCEDURE — 85027 COMPLETE CBC AUTOMATED: CPT | Mod: 91

## 2025-01-25 PROCEDURE — 99291 CRITICAL CARE FIRST HOUR: CPT | Mod: ,,, | Performed by: INTERNAL MEDICINE

## 2025-01-25 PROCEDURE — 83735 ASSAY OF MAGNESIUM: CPT

## 2025-01-25 PROCEDURE — 63600175 PHARM REV CODE 636 W HCPCS: Mod: TB

## 2025-01-25 PROCEDURE — 63600175 PHARM REV CODE 636 W HCPCS

## 2025-01-25 PROCEDURE — 63600175 PHARM REV CODE 636 W HCPCS: Mod: JZ,TB

## 2025-01-25 PROCEDURE — 25000003 PHARM REV CODE 250: Performed by: INTERNAL MEDICINE

## 2025-01-25 PROCEDURE — 94761 N-INVAS EAR/PLS OXIMETRY MLT: CPT

## 2025-01-25 PROCEDURE — 80053 COMPREHEN METABOLIC PANEL: CPT

## 2025-01-25 PROCEDURE — 84100 ASSAY OF PHOSPHORUS: CPT

## 2025-01-25 PROCEDURE — 20000000 HC ICU ROOM

## 2025-01-25 RX ORDER — HYDROMORPHONE HYDROCHLORIDE 1 MG/ML
1 INJECTION, SOLUTION INTRAMUSCULAR; INTRAVENOUS; SUBCUTANEOUS EVERY 4 HOURS PRN
Status: DISCONTINUED | OUTPATIENT
Start: 2025-01-25 | End: 2025-01-25

## 2025-01-25 RX ORDER — MUPIROCIN 20 MG/G
OINTMENT TOPICAL 2 TIMES DAILY
Status: COMPLETED | OUTPATIENT
Start: 2025-01-25 | End: 2025-01-30

## 2025-01-25 RX ORDER — HYDROMORPHONE HYDROCHLORIDE 1 MG/ML
0.5 INJECTION, SOLUTION INTRAMUSCULAR; INTRAVENOUS; SUBCUTANEOUS ONCE
Status: COMPLETED | OUTPATIENT
Start: 2025-01-25 | End: 2025-01-25

## 2025-01-25 RX ORDER — METHOCARBAMOL 500 MG/1
500 TABLET, FILM COATED ORAL EVERY 6 HOURS PRN
Status: DISCONTINUED | OUTPATIENT
Start: 2025-01-25 | End: 2025-01-28

## 2025-01-25 RX ORDER — LIDOCAINE 50 MG/G
2 PATCH TOPICAL
Status: DISCONTINUED | OUTPATIENT
Start: 2025-01-25 | End: 2025-02-01 | Stop reason: HOSPADM

## 2025-01-25 RX ORDER — ACETAMINOPHEN 325 MG/1
650 TABLET ORAL EVERY 6 HOURS PRN
Status: DISCONTINUED | OUTPATIENT
Start: 2025-01-25 | End: 2025-01-28

## 2025-01-25 RX ORDER — HYDRALAZINE HYDROCHLORIDE 25 MG/1
25 TABLET, FILM COATED ORAL EVERY 8 HOURS
Status: DISCONTINUED | OUTPATIENT
Start: 2025-01-25 | End: 2025-01-25

## 2025-01-25 RX ORDER — LISINOPRIL 20 MG/1
40 TABLET ORAL DAILY
Status: DISCONTINUED | OUTPATIENT
Start: 2025-01-25 | End: 2025-01-26

## 2025-01-25 RX ORDER — LISINOPRIL 20 MG/1
40 TABLET ORAL DAILY
Status: DISCONTINUED | OUTPATIENT
Start: 2025-01-25 | End: 2025-01-25

## 2025-01-25 RX ORDER — LISINOPRIL 20 MG/1
40 TABLET ORAL DAILY
Status: CANCELLED | OUTPATIENT
Start: 2025-01-25

## 2025-01-25 RX ORDER — CLOTRIMAZOLE AND BETAMETHASONE DIPROPIONATE 10; .64 MG/G; MG/G
CREAM TOPICAL 2 TIMES DAILY
Status: DISCONTINUED | OUTPATIENT
Start: 2025-01-26 | End: 2025-01-31

## 2025-01-25 RX ORDER — HYDROMORPHONE HYDROCHLORIDE 1 MG/ML
0.5 INJECTION, SOLUTION INTRAMUSCULAR; INTRAVENOUS; SUBCUTANEOUS EVERY 4 HOURS PRN
Status: DISCONTINUED | OUTPATIENT
Start: 2025-01-25 | End: 2025-01-27

## 2025-01-25 RX ORDER — AMOXICILLIN 250 MG
1 CAPSULE ORAL DAILY
Status: DISCONTINUED | OUTPATIENT
Start: 2025-01-25 | End: 2025-02-01 | Stop reason: HOSPADM

## 2025-01-25 RX ORDER — HYDROMORPHONE HYDROCHLORIDE 1 MG/ML
0.4 INJECTION, SOLUTION INTRAMUSCULAR; INTRAVENOUS; SUBCUTANEOUS EVERY 4 HOURS PRN
Status: DISCONTINUED | OUTPATIENT
Start: 2025-01-25 | End: 2025-01-25

## 2025-01-25 RX ORDER — POLYETHYLENE GLYCOL 3350 17 G/17G
17 POWDER, FOR SOLUTION ORAL 2 TIMES DAILY
Status: DISCONTINUED | OUTPATIENT
Start: 2025-01-25 | End: 2025-01-30

## 2025-01-25 RX ADMIN — MUPIROCIN: 20 OINTMENT TOPICAL at 08:01

## 2025-01-25 RX ADMIN — SENNOSIDES AND DOCUSATE SODIUM 1 TABLET: 50; 8.6 TABLET ORAL at 11:01

## 2025-01-25 RX ADMIN — METHOCARBAMOL 500 MG: 500 TABLET ORAL at 10:01

## 2025-01-25 RX ADMIN — LISINOPRIL 40 MG: 20 TABLET ORAL at 02:01

## 2025-01-25 RX ADMIN — LIDOCAINE 2 PATCH: 50 PATCH CUTANEOUS at 03:01

## 2025-01-25 RX ADMIN — POLYETHYLENE GLYCOL 3350 17 G: 17 POWDER, FOR SOLUTION ORAL at 11:01

## 2025-01-25 RX ADMIN — BUPRENORPHINE AND NALOXONE 3 FILM: 8; 2 FILM BUCCAL; SUBLINGUAL at 11:01

## 2025-01-25 RX ADMIN — METHOCARBAMOL 500 MG: 500 TABLET ORAL at 08:01

## 2025-01-25 RX ADMIN — HYDROMORPHONE HYDROCHLORIDE 0.4 MG: 1 INJECTION, SOLUTION INTRAMUSCULAR; INTRAVENOUS; SUBCUTANEOUS at 02:01

## 2025-01-25 RX ADMIN — HYDROMORPHONE HYDROCHLORIDE 0.5 MG: 1 INJECTION, SOLUTION INTRAMUSCULAR; INTRAVENOUS; SUBCUTANEOUS at 11:01

## 2025-01-25 RX ADMIN — MENTHOL 10%, METHYL SALICYLATE 15%: 10; 15 CREAM TOPICAL at 09:01

## 2025-01-25 RX ADMIN — INSULIN GLARGINE 15 UNITS: 100 INJECTION, SOLUTION SUBCUTANEOUS at 07:01

## 2025-01-25 RX ADMIN — POLYETHYLENE GLYCOL 3350 17 G: 17 POWDER, FOR SOLUTION ORAL at 08:01

## 2025-01-25 RX ADMIN — METOPROLOL SUCCINATE 25 MG: 25 TABLET, EXTENDED RELEASE ORAL at 08:01

## 2025-01-25 RX ADMIN — HYDROMORPHONE HYDROCHLORIDE 1 MG: 1 INJECTION, SOLUTION INTRAMUSCULAR; INTRAVENOUS; SUBCUTANEOUS at 04:01

## 2025-01-25 RX ADMIN — HYDROMORPHONE HYDROCHLORIDE 0.4 MG: 1 INJECTION, SOLUTION INTRAMUSCULAR; INTRAVENOUS; SUBCUTANEOUS at 07:01

## 2025-01-25 RX ADMIN — INSULIN ASPART 1 UNITS: 100 INJECTION, SOLUTION INTRAVENOUS; SUBCUTANEOUS at 08:01

## 2025-01-25 RX ADMIN — HYDROMORPHONE HYDROCHLORIDE 0.5 MG: 1 INJECTION, SOLUTION INTRAMUSCULAR; INTRAVENOUS; SUBCUTANEOUS at 08:01

## 2025-01-25 RX ADMIN — ONDANSETRON 4 MG: 2 INJECTION INTRAMUSCULAR; INTRAVENOUS at 12:01

## 2025-01-25 RX ADMIN — METHOCARBAMOL 500 MG: 500 TABLET ORAL at 05:01

## 2025-01-25 RX ADMIN — MENTHOL 10%, METHYL SALICYLATE 15%: 10; 15 CREAM TOPICAL at 02:01

## 2025-01-25 RX ADMIN — ONDANSETRON 4 MG: 2 INJECTION INTRAMUSCULAR; INTRAVENOUS at 08:01

## 2025-01-25 RX ADMIN — HYDRALAZINE HYDROCHLORIDE 25 MG: 25 TABLET ORAL at 06:01

## 2025-01-25 RX ADMIN — METHOCARBAMOL 500 MG: 500 TABLET ORAL at 03:01

## 2025-01-25 RX ADMIN — HYDRALAZINE HYDROCHLORIDE 25 MG: 25 TABLET ORAL at 01:01

## 2025-01-25 RX ADMIN — LISINOPRIL 40 MG: 20 TABLET ORAL at 11:01

## 2025-01-25 NOTE — ASSESSMENT & PLAN NOTE
Complaints of abdominal distention and mild tenderness since 1/24.  Reports having normal bowel movements every 3-4 days.  Patient reports being able to pass gas.  CTA AP from 01/25 showed decompressed stomach with perigastric soft tissue stranding posteriorly.  No large bowel obstruction noted.  Moderate volume stool throughout the colon.    -follow up KUB  - start MiraLax b.i.d. and senna/docusate

## 2025-01-25 NOTE — ASSESSMENT & PLAN NOTE
Creatinine 1.4 on admit, baseline around 0.8    Plan:   Lab Results   Component Value Date    CREATININE 0.9 01/25/2025     Likely 2/2 blood loss in setting of ruptured R gonadal artery.  Improved    Plan:  - Strict I&Os and daily weights   - Gentle IVF  - Avoid nephrotoxic agents such as NSAIDs, gadolinium and IV radiocontrast.  - Renally dose meds to current GFR.  - Maintain MAP > 65.  - creatinine now at 0.9

## 2025-01-25 NOTE — ASSESSMENT & PLAN NOTE
"63 y/o M with a medical hx of DM, hip fracture, GERD, HTN, HLD, and paroxysmal Afib who presented to [facility] as a transfer from [place] ED. Pt originally presented to [place] ED for R flank pain. He states he recently seen at [facility] for a kidney stone and thought pain was related to that.      CTA Abdomen showed: Large right perinephric and midline retroperitoneal hematomas with active contrast extravasation. Several areas of cortical hypoattenuation of the right kidney, suspicious for low-grade renal lacerations or infarcts.     IR consulted for emergent embolization. Concern for rupture of R gonadal artery, with concerns patient may lose R testicle. Admitted to MICU for concerns of hemorrhagic shock. HDS upon admission. Hgb stable at 10, baseline around 14. Now s/p IR embolization.  Urology consulted, Embolization of gonadal unlikely to cause testicular atrophy due collateral blood supply. Repeat CTA on 11/25 did not show active hemorrhage but redemonstrated large right renal subcapsular hematoma with mass effect suggesting page kidney.  IR consulted for possible drainage of hematoma.  Per IR: "Given dense appearance of hematoma on CT, a percutaneous drain is unlikely to decompress the hematoma. However, recommend right renal ultrasound to evaluate for any lower density regions of the hematoma that may be amendable to drainage."     Plan:  - follow up retroperitoneal U/S to evaluate for possible regions amenable for drainage  - Will trend hemoglobin and monitor for hemodynamic instability.  - Transfuse PRN  - holding AC meds; plan to resume if hemoglobin is stable for more than 24 hours  "

## 2025-01-25 NOTE — ASSESSMENT & PLAN NOTE
Joe Morgan is a 64 y.o. male with a PMHx of pAF, HTN and T2DM presenting as a transfer from outside ED due to retroperitoneal hematomas with active contrast extravasation on CTA. Underwent angiogram and right gonadal artery embolization with IR. Urology consulted for RP bleed and concerns for testicular atrophy s/p embolization.    -- Embolization of gonadal unlikely to cause testicular atrophy due collateral blood supply  -- Serial Hgb  -- Transfuse as necessary to maintain Hg >7.0.  -- If patient decompensates, or requires mass transfusions may require repeat embolization.   -- Okay for diet  -- Hold AC/AP as feasible.   -- Repeat CTA 1/24/25 showed no active bleed  -- Will need outpatient referral to Urology to establish care in [place]. Will need abdominal imaging to assess for renal mass

## 2025-01-25 NOTE — NURSING
IONA Reid DO notified for pt c/o of abdominal and back pain. Abdominal pain 6/10 aching. Upon assessment no discoloration and VSS.  Per DO, will place order for CTA and blood. Will continue to monitor.

## 2025-01-25 NOTE — NURSING
IONA Reid,  notified for pt SBP over 160. HR 80's. No PRN in MAR. Pain is not controlled with current pain medication. Per MD, will place order for dose increase for pain medication and hydralazine PO. Will continue to monitor.     0338: MD notified for muscle spasms in the back. Per MD, will place orders. Will continue to monitor.

## 2025-01-25 NOTE — NURSING
MICU DAILY GOALS     Family/Goals of care/Code Status   Code Status: Full Code    24H Vital Sign Range  Temp:  [96 °F (35.6 °C)-99.1 °F (37.3 °C)]   Pulse:  []   Resp:  [16-34]   BP: (126-184)/(58-82)   SpO2:  [89 %-98 %]      Shift Events (include procedures and significant events)   Hypertensive SBP over 160; hydralazine 25 mg PO given as scheduled. Pain not controlled by 0.2 dilaudid IV; changed dose to 0.4. Muscle spasms in back; PO and lidocaine patch added. C/O abdominal and back; CTA done.     AWAKE RASS: Goal -    Actual - RASS (Paz Agitation-Sedation Scale): alert and calm    Restraint necessity: Not necessary   BREATHE SBT: Not attempted    Coordinate A & B, analgesics/sedatives Pain: managed   SAT: Not intubated   Delirium CAM-ICU:     Early(intubated/ Progressive (non-intubated) Mobility MOVE Screen (INTUBATED ONLY): Not intubated    Activity: Activity Management: Rolling - L1   Feeding/Nutrition Diet order: Diet/Nutrition Received: NPO,     Thrombus DVT prophylaxis: VTE Core Measure: Per order contraindicated for SCDs/Anticoagulants   HOB Elevation Head of Bed (HOB) Positioning: HOB elevated   Ulcer Prophylaxis GI: no   Glucose control managed Glycemic Management: blood glucose monitored   Skin Skin assessment:     Sacrum intact/not altered? Yes  Heels intact/not altered? Yes  Surgical wound? No    CHECK ONE!   (no altered skin or altered skin) and sub boxes:  [] No Altered Skin Integrity Present    []Prevention Measures Documented    [x] Altered Skin Integrity Present or Discovered   [x] LDA present in EPIC, daily doc completed              [] LDA added if not in EPIC (describe wound).                    When describing wound, do not stage, use descriptive words only.    [] Wound Image Taken (required on admit,                   transfer/discharge and every Tuesday)    Wound Care Consulted? No   Bowel Function no issues    Indwelling Catheter Necessity            De-escalation Antibiotics No         VS and assessment per flow sheet, patient progressing towards goals as tolerated, plan of care reviewed with  PATIENT , all concerns addressed, will continue to monitor.

## 2025-01-25 NOTE — PROGRESS NOTES
[facility] - Medical ICU  Urology  Progress Note    Patient Name: Joe Morgan  MRN: [id number]  Admission Date: 1/23/2025  Hospital Length of Stay: 2 days  Code Status: Full Code   Attending Provider: Matthew Valdivia*   Primary Care Physician: Johnnie Gibbs MD    Subjective:     HPI:  Joe Morgan is a 64 y.o. male with a PMHx of pAF, HTN and T2DM presenting as a transfer from outside ED due to retroperitoneal hematomas with active contrast extravasation on CTA. Underwent angiogram and right gonadal artery embolization with IR. Urology consulted for RP bleed and concerns for testicular atrophy s/p embolization.    On assessment the patient is AFVSS. States that he originally presented [facility] ED for right flank pain and CTA found to have right perinephric and retroperitoneal hematoma. Also passed a presumed kidney stone in the ED yesterday. Has never seen a urologist in the past. Denies fevers, chills, hematuria.     WBC 14.58, Hg at 10.0, received 1 upRBC at OSH. Cr is 1.4 (baseline appears 0.9-1.3). UA nitrite negative, 6 RBC, 47 WBC and few bacteria with 2 squamous cells.     CT GI bleed showed contrast in the collecting system, no obvious hydronephrosis, or stone, right perinephric and retroperitoneal hematoma present.             Interval History: AF. CTA showed no active extrav. Responded to 2u prbc. C/o abdominal pain    Review of Systems  Objective:     Temp:  [96 °F (35.6 °C)-99.1 °F (37.3 °C)] 97.8 °F (36.6 °C)  Pulse:  [] 86  Resp:  [16-34] 18  SpO2:  [89 %-98 %] 95 %  BP: (126-187)/(58-86) 187/86     Body mass index is 35.63 kg/m².           Drains       None                    Physical Exam  Constitutional:       General: He is not in acute distress.     Appearance: Normal appearance.   HENT:      Head: Normocephalic and atraumatic.   Eyes:      Extraocular Movements: Extraocular movements intact.      Pupils: Pupils are equal, round, and reactive to light.   Cardiovascular:       Rate and Rhythm: Normal rate.   Pulmonary:      Effort: Pulmonary effort is normal. No respiratory distress.   Abdominal:      General: Abdomen is flat. There is no distension.      Tenderness: There is abdominal tenderness. There is no right CVA tenderness or left CVA tenderness.   Genitourinary:     Comments: Normal appearing testicles bilaterally. Circumcised   Skin:     Coloration: Skin is not jaundiced.   Neurological:      General: No focal deficit present.      Mental Status: He is alert and oriented to person, place, and time.   Psychiatric:         Mood and Affect: Mood normal.         Behavior: Behavior normal.           Significant Labs:    BMP:  Recent Labs   Lab 01/24/25  0349 01/24/25  0512 01/25/25  0325   * 134* 135*   K 6.0* 5.4* 4.6    107 109   CO2 16* 18* 19*   BUN 26* 26* 21   CREATININE 1.1 1.2 0.9   CALCIUM 7.5* 7.6* 8.2*       CBC:   Recent Labs   Lab 01/24/25  2028 01/24/25  2205 01/25/25  0325   WBC 14.58* 13.87* 11.45   HGB 7.1* 7.5* 7.4*   HCT 21.1* 22.4* 22.4*    331 327       All pertinent labs results from the past 24 hours have been reviewed.    Significant Imaging:  All pertinent imaging results/findings from the past 24 hours have been reviewed.                  Assessment/Plan:     * Retroperitoneal hematoma  Joe Morgan is a 64 y.o. male with a PMHx of pAF, HTN and T2DM presenting as a transfer from outside ED due to retroperitoneal hematomas with active contrast extravasation on CTA. Underwent angiogram and right gonadal artery embolization with IR. Urology consulted for RP bleed and concerns for testicular atrophy s/p embolization.    -- Embolization of gonadal unlikely to cause testicular atrophy due collateral blood supply  -- Serial Hgb  -- Transfuse as necessary to maintain Hg >7.0.  -- If patient decompensates, or requires mass transfusions may require repeat embolization.   -- Okay for diet  -- Hold AC/AP as feasible.   -- Repeat CTA 1/24/25  showed no active bleed  -- Will need outpatient referral to Urology to establish care in [place]. Will need abdominal imaging to assess for renal mass        VTE Risk Mitigation (From admission, onward)           Ordered     Reason for No Pharmacological VTE Prophylaxis  Once        Question:  Reasons:  Answer:  Active Bleeding    01/23/25 2338     IP VTE HIGH RISK PATIENT  Once         01/23/25 2318     Place sequential compression device  Until discontinued         01/23/25 2318                    Gagandeep Orr MD  Urology  [facility] - Medical ICU

## 2025-01-25 NOTE — HOSPITAL COURSE
Admitted to MICU due to high risk of decompensation 2/2 active bleed.  On 1/24, hemoglobin continued to trend down s/p 2 units of pRBCs.  Repeat CTA A/P did not show active hemorrhage but redemonstrated large right renal subcapsular hematoma with mass effect suggesting page kidney.  Started on oral hydralazine which was switched to lisinopril.  IR reconsulted for possible drainage of hematoma and recommended retroperitoneal U/S.  Patient's pain is currently uncontrolled. Patient's suboxone was spaced out to 1 film TID for longer pain control throughout the day. Uptitrate films as medically necessary. Hemoglobin trend stable. Restarted lovenox. Ready for stepdown to hospital medicine.

## 2025-01-25 NOTE — CONSULTS
Radiology Consult    Joe Morgan is a 64 y.o. male with a history of right subcapsular renal hematoma and RP hematoma s/p R testicular artery embolization with page kidney, IR consulted for possible drain placement.  Past Medical History:   Diagnosis Date    Diabetes mellitus     GERD (gastroesophageal reflux disease)     Hip discomfort     Hip fracture     Hyperlipidemia     Hypertension     Pancreas disorder     Spine pain      Past Surgical History:   Procedure Laterality Date    ANGIOGRAM, RENAL, BILATERAL, SELECTIVE, WITH S&I Bilateral 1/23/2025    Procedure: ANGIOGRAM, RENAL, BILATERAL, SELECTIVE, WITH S&I;  Surgeon: SurgeonCeleste;  Location: [facility];  Service: Anesthesiology;  Laterality: Bilateral;    FRACTURE SURGERY         Scheduled Meds:    buprenorphine-naloxone 8-2 mg  3 Film Sublingual Daily    hydrALAZINE  25 mg Oral Q8H    insulin aspart U-100  6 Units Subcutaneous TIDWM    insulin glargine U-100  15 Units Subcutaneous Daily    LIDOcaine  2 patch Transdermal Q24H    methyl salicylate-menthol 15-10%   Topical (Top) TID    metoprolol succinate  25 mg Oral Daily     Continuous Infusions:   PRN Meds:  Current Facility-Administered Medications:     0.9%  NaCl infusion (for blood administration), , Intravenous, Q24H PRN    0.9%  NaCl infusion (for blood administration), , Intravenous, Q24H PRN    dextrose 50%, 12.5 g, Intravenous, PRN    dextrose 50%, 25 g, Intravenous, PRN    glucagon (human recombinant), 1 mg, Intramuscular, PRN    glucose, 16 g, Oral, PRN    glucose, 24 g, Oral, PRN    HYDROmorphone, 0.4 mg, Intravenous, Q4H PRN    insulin aspart U-100, 0-5 Units, Subcutaneous, QID (AC + HS) PRN    methocarbamoL, 500 mg, Oral, Q6H PRN    naloxone, 0.02 mg, Intravenous, PRN    ondansetron, 4 mg, Intravenous, Q6H PRN    sodium chloride 0.9%, 10 mL, Intravenous, PRN    Allergies:   Review of patient's allergies indicates:   Allergen Reactions    Nubain [nalbuphine] Anaphylaxis    Ativan  [lorazepam] Other (See Comments)     Climbs wall         Labs:  Recent Labs   Lab 01/24/25  0041   INR 1.2       Recent Labs   Lab 01/25/25  0325   WBC 11.45   HGB 7.4*   HCT 22.4*   MCV 86         Recent Labs   Lab 01/25/25 0325   *   *   K 4.6      CO2 19*   BUN 21   CREATININE 0.9   CALCIUM 8.2*   MG 2.4   ALT 17   AST 15   ALBUMIN 1.7*   BILITOT 0.5         Vitals (Most Recent):  Temp: 97.8 °F (36.6 °C) (01/25/25 0315)  Pulse: 90 (01/25/25 0807)  Resp: 18 (01/25/25 0752)  BP: (!) 164/75 (01/25/25 0807)  SpO2: 95 % (01/25/25 0749)    Plan:   1. Given dense appearance of hematoma on CT, a percutaneous drain is unlikely to decompress the hematoma. However, recommend right renal ultrasound to evaluate for any lower density regions of the hematoma that may be amendable to drainage.       Felix Ribera MD  Interventional Radiology  Department of Radiology

## 2025-01-25 NOTE — ASSESSMENT & PLAN NOTE
Takes lisinopril 40 mg and metoprolol succinate 25 mg as home medications. HDS upon admission and s/p embolization.     - restart lisinopril in the setting of page kidney

## 2025-01-25 NOTE — SUBJECTIVE & OBJECTIVE
Interval History/Significant Events:  Overnight, Hemoglobin stable to 7.4 s/p 2 units yesterday.  Repeat CTA A/P did not show active hemorrhage but redemonstrated large right renal subcapsular hematoma with mass effect suggesting page kidney.  Started on oral hydralazine overnight.  Seen patient this morning.  Reports some abdominal tenderness reports not having a bowel movement in the last 3 days which is normal for him.  Started on bowel regimen    Review of Systems   Gastrointestinal:  Positive for abdominal distention, abdominal pain and constipation. Negative for diarrhea, nausea and vomiting.   All other systems reviewed and are negative.    Objective:     Vital Signs (Most Recent):  Temp: 98.6 °F (37 °C) (01/25/25 1101)  Pulse: 91 (01/25/25 1101)  Resp: 18 (01/25/25 1105)  BP: (!) 153/67 (01/25/25 1100)  SpO2: (!) 91 % (01/25/25 1101) Vital Signs (24h Range):  Temp:  [96 °F (35.6 °C)-98.6 °F (37 °C)] 98.6 °F (37 °C)  Pulse:  [] 91  Resp:  [16-34] 18  SpO2:  [89 %-98 %] 91 %  BP: (130-190)/(58-86) 153/67   Weight: 112.6 kg (248 lb 4.8 oz)  Body mass index is 35.63 kg/m².      Intake/Output Summary (Last 24 hours) at 1/25/2025 1359  Last data filed at 1/25/2025 0600  Gross per 24 hour   Intake 619.25 ml   Output 1425 ml   Net -805.75 ml          Physical Exam  Vitals and nursing note reviewed.   Constitutional:       Appearance: He is well-developed.   HENT:      Head: Normocephalic.      Mouth/Throat:      Mouth: Mucous membranes are moist.   Eyes:      Pupils: Pupils are equal, round, and reactive to light.   Cardiovascular:      Rate and Rhythm: Normal rate and regular rhythm.      Pulses: Normal pulses.   Pulmonary:      Effort: Pulmonary effort is normal.   Abdominal:      General: Bowel sounds are normal. There is distension.      Palpations: Abdomen is soft.      Tenderness: There is abdominal tenderness (Mild generalized tenderness to palpation).   Musculoskeletal:         General: No swelling.       Comments: Swelling right groin at incision site.    Skin:     General: Skin is warm and dry.      Capillary Refill: Capillary refill takes less than 2 seconds.   Neurological:      Mental Status: He is alert and oriented to person, place, and time.   Psychiatric:         Mood and Affect: Mood normal.         Behavior: Behavior normal.         Thought Content: Thought content normal.         Judgment: Judgment normal.            Vents:     Lines/Drains/Airways       Peripheral Intravenous Line  Duration                  Peripheral IV - Single Lumen 01/23/25 18 G Right Antecubital 2 days         Peripheral IV - Single Lumen 01/23/25 2110 20 G Anterior;Distal;Left Forearm 1 day                  Significant Labs:    CBC/Anemia Profile:  Recent Labs   Lab 01/24/25  2205 01/25/25  0325 01/25/25  1131   WBC 13.87* 11.45 10.41   HGB 7.5* 7.4* 7.5*   HCT 22.4* 22.4* 22.8*    327 327   MCV 85 86 86   RDW 17.0* 17.0* 17.1*        Chemistries:  Recent Labs   Lab 01/23/25  1700 01/24/25  0349 01/24/25  0512 01/25/25  0325   * 131* 134* 135*   K 4.9 6.0* 5.4* 4.6    106 107 109   CO2 13* 16* 18* 19*   BUN 25* 26* 26* 21   CREATININE 1.4 1.1 1.2 0.9   CALCIUM 8.1* 7.5* 7.6* 8.2*   ALBUMIN 2.0* 1.7*  --  1.7*   PROT 6.8 5.9*  --  6.2   BILITOT 0.6 0.4  --  0.5   ALKPHOS 112 84  --  81   ALT 29 20  --  17   AST 29 18  --  15   MG 2.1 2.1  --  2.4   PHOS  --  4.8*  --  3.2       All pertinent labs within the past 24 hours have been reviewed.    Significant Imaging:  I have reviewed all pertinent imaging results/findings within the past 24 hours.

## 2025-01-26 LAB
ABO + RH BLD: NORMAL
ALBUMIN SERPL BCP-MCNC: 1.7 G/DL (ref 3.5–5.2)
ALP SERPL-CCNC: 75 U/L (ref 40–150)
ALT SERPL W/O P-5'-P-CCNC: 17 U/L (ref 10–44)
ANION GAP SERPL CALC-SCNC: 6 MMOL/L (ref 8–16)
APTT PPP: 27.7 SEC (ref 21–32)
AST SERPL-CCNC: 22 U/L (ref 10–40)
BACTERIA UR CULT: NO GROWTH
BILIRUB SERPL-MCNC: 0.6 MG/DL (ref 0.1–1)
BLD GP AB SCN CELLS X3 SERPL QL: NORMAL
BLD PROD TYP BPU: NORMAL
BLOOD UNIT EXPIRATION DATE: NORMAL
BLOOD UNIT TYPE CODE: 6200
BLOOD UNIT TYPE: NORMAL
BUN SERPL-MCNC: 15 MG/DL (ref 8–23)
CALCIUM SERPL-MCNC: 8.1 MG/DL (ref 8.7–10.5)
CHLORIDE SERPL-SCNC: 107 MMOL/L (ref 95–110)
CO2 SERPL-SCNC: 22 MMOL/L (ref 23–29)
CODING SYSTEM: NORMAL
CREAT SERPL-MCNC: 0.8 MG/DL (ref 0.5–1.4)
CROSSMATCH INTERPRETATION: NORMAL
DISPENSE STATUS: NORMAL
ERYTHROCYTE [DISTWIDTH] IN BLOOD BY AUTOMATED COUNT: 15.9 % (ref 11.5–14.5)
ERYTHROCYTE [DISTWIDTH] IN BLOOD BY AUTOMATED COUNT: 16.7 % (ref 11.5–14.5)
EST. GFR  (NO RACE VARIABLE): >60 ML/MIN/1.73 M^2
FIBRINOGEN PPP-MCNC: 604 MG/DL (ref 182–400)
GLUCOSE SERPL-MCNC: 154 MG/DL (ref 70–110)
HCT VFR BLD AUTO: 20.9 % (ref 40–54)
HCT VFR BLD AUTO: 25.9 % (ref 40–54)
HGB BLD-MCNC: 6.8 G/DL (ref 14–18)
HGB BLD-MCNC: 8.5 G/DL (ref 14–18)
INR PPP: 1.1 (ref 0.8–1.2)
MAGNESIUM SERPL-MCNC: 2.2 MG/DL (ref 1.6–2.6)
MCH RBC QN AUTO: 28.2 PG (ref 27–31)
MCH RBC QN AUTO: 28.2 PG (ref 27–31)
MCHC RBC AUTO-ENTMCNC: 32.5 G/DL (ref 32–36)
MCHC RBC AUTO-ENTMCNC: 32.8 G/DL (ref 32–36)
MCV RBC AUTO: 86 FL (ref 82–98)
MCV RBC AUTO: 87 FL (ref 82–98)
NUM UNITS TRANS PACKED RBC: NORMAL
PHOSPHATE SERPL-MCNC: 3.1 MG/DL (ref 2.7–4.5)
PLATELET # BLD AUTO: 346 K/UL (ref 150–450)
PLATELET # BLD AUTO: 397 K/UL (ref 150–450)
PMV BLD AUTO: 8.7 FL (ref 9.2–12.9)
PMV BLD AUTO: 8.9 FL (ref 9.2–12.9)
POCT GLUCOSE: 179 MG/DL (ref 70–110)
POCT GLUCOSE: 216 MG/DL (ref 70–110)
POCT GLUCOSE: 244 MG/DL (ref 70–110)
POCT GLUCOSE: 245 MG/DL (ref 70–110)
POCT GLUCOSE: 253 MG/DL (ref 70–110)
POTASSIUM SERPL-SCNC: 4.5 MMOL/L (ref 3.5–5.1)
PROT SERPL-MCNC: 6.2 G/DL (ref 6–8.4)
PROTHROMBIN TIME: 11.9 SEC (ref 9–12.5)
RBC # BLD AUTO: 2.41 M/UL (ref 4.6–6.2)
RBC # BLD AUTO: 3.01 M/UL (ref 4.6–6.2)
SODIUM SERPL-SCNC: 135 MMOL/L (ref 136–145)
SPECIMEN OUTDATE: NORMAL
WBC # BLD AUTO: 10.51 K/UL (ref 3.9–12.7)
WBC # BLD AUTO: 8.35 K/UL (ref 3.9–12.7)

## 2025-01-26 PROCEDURE — 85027 COMPLETE CBC AUTOMATED: CPT | Mod: 91

## 2025-01-26 PROCEDURE — 63600175 PHARM REV CODE 636 W HCPCS

## 2025-01-26 PROCEDURE — 85240 CLOT FACTOR VIII AHG 1 STAGE: CPT

## 2025-01-26 PROCEDURE — 85730 THROMBOPLASTIN TIME PARTIAL: CPT

## 2025-01-26 PROCEDURE — 85384 FIBRINOGEN ACTIVITY: CPT

## 2025-01-26 PROCEDURE — 94761 N-INVAS EAR/PLS OXIMETRY MLT: CPT

## 2025-01-26 PROCEDURE — 20000000 HC ICU ROOM

## 2025-01-26 PROCEDURE — 63600175 PHARM REV CODE 636 W HCPCS: Mod: JZ,TB

## 2025-01-26 PROCEDURE — 27000221 HC OXYGEN, UP TO 24 HOURS

## 2025-01-26 PROCEDURE — 25000003 PHARM REV CODE 250

## 2025-01-26 PROCEDURE — 25000003 PHARM REV CODE 250: Performed by: INTERNAL MEDICINE

## 2025-01-26 PROCEDURE — 36430 TRANSFUSION BLD/BLD COMPNT: CPT

## 2025-01-26 PROCEDURE — 86920 COMPATIBILITY TEST SPIN: CPT

## 2025-01-26 PROCEDURE — 85610 PROTHROMBIN TIME: CPT

## 2025-01-26 PROCEDURE — 99233 SBSQ HOSP IP/OBS HIGH 50: CPT | Mod: ,,, | Performed by: INTERNAL MEDICINE

## 2025-01-26 PROCEDURE — 99900035 HC TECH TIME PER 15 MIN (STAT)

## 2025-01-26 PROCEDURE — P9016 RBC LEUKOCYTES REDUCED: HCPCS

## 2025-01-26 PROCEDURE — 80053 COMPREHEN METABOLIC PANEL: CPT

## 2025-01-26 PROCEDURE — 84100 ASSAY OF PHOSPHORUS: CPT

## 2025-01-26 PROCEDURE — 83735 ASSAY OF MAGNESIUM: CPT

## 2025-01-26 PROCEDURE — 86901 BLOOD TYPING SEROLOGIC RH(D): CPT

## 2025-01-26 RX ORDER — SIMETHICONE 80 MG
1 TABLET,CHEWABLE ORAL 3 TIMES DAILY PRN
Status: DISCONTINUED | OUTPATIENT
Start: 2025-01-26 | End: 2025-02-01 | Stop reason: HOSPADM

## 2025-01-26 RX ORDER — AMLODIPINE BESYLATE 2.5 MG/1
5 TABLET ORAL DAILY
Status: DISCONTINUED | OUTPATIENT
Start: 2025-01-26 | End: 2025-01-26

## 2025-01-26 RX ORDER — AMLODIPINE BESYLATE 2.5 MG/1
5 TABLET ORAL
Status: COMPLETED | OUTPATIENT
Start: 2025-01-26 | End: 2025-01-26

## 2025-01-26 RX ORDER — ALUMINUM HYDROXIDE, MAGNESIUM HYDROXIDE, AND SIMETHICONE 2400; 240; 2400 MG/30ML; MG/30ML; MG/30ML
30 SUSPENSION ORAL
Status: COMPLETED | OUTPATIENT
Start: 2025-01-26 | End: 2025-01-26

## 2025-01-26 RX ORDER — LABETALOL HCL 20 MG/4 ML
10 SYRINGE (ML) INTRAVENOUS EVERY 4 HOURS PRN
Status: DISCONTINUED | OUTPATIENT
Start: 2025-01-26 | End: 2025-01-26

## 2025-01-26 RX ORDER — INSULIN GLARGINE 100 [IU]/ML
17 INJECTION, SOLUTION SUBCUTANEOUS DAILY
Status: DISCONTINUED | OUTPATIENT
Start: 2025-01-26 | End: 2025-01-27

## 2025-01-26 RX ORDER — AMLODIPINE BESYLATE 10 MG/1
10 TABLET ORAL DAILY
Status: DISCONTINUED | OUTPATIENT
Start: 2025-01-27 | End: 2025-02-01 | Stop reason: HOSPADM

## 2025-01-26 RX ORDER — AMOXICILLIN 250 MG
1 CAPSULE ORAL
Status: COMPLETED | OUTPATIENT
Start: 2025-01-26 | End: 2025-01-26

## 2025-01-26 RX ORDER — SIMETHICONE 80 MG
1 TABLET,CHEWABLE ORAL 3 TIMES DAILY PRN
Status: DISCONTINUED | OUTPATIENT
Start: 2025-01-26 | End: 2025-01-26

## 2025-01-26 RX ORDER — HYDROCODONE BITARTRATE AND ACETAMINOPHEN 500; 5 MG/1; MG/1
TABLET ORAL
Status: DISCONTINUED | OUTPATIENT
Start: 2025-01-26 | End: 2025-01-30

## 2025-01-26 RX ORDER — LABETALOL HCL 20 MG/4 ML
10 SYRINGE (ML) INTRAVENOUS EVERY 4 HOURS PRN
Status: DISCONTINUED | OUTPATIENT
Start: 2025-01-26 | End: 2025-02-01 | Stop reason: HOSPADM

## 2025-01-26 RX ADMIN — HYDROMORPHONE HYDROCHLORIDE 0.5 MG: 1 INJECTION, SOLUTION INTRAMUSCULAR; INTRAVENOUS; SUBCUTANEOUS at 04:01

## 2025-01-26 RX ADMIN — HYDROMORPHONE HYDROCHLORIDE 0.5 MG: 1 INJECTION, SOLUTION INTRAMUSCULAR; INTRAVENOUS; SUBCUTANEOUS at 05:01

## 2025-01-26 RX ADMIN — ACETAMINOPHEN 650 MG: 325 TABLET ORAL at 12:01

## 2025-01-26 RX ADMIN — SIMETHICONE 80 MG: 80 TABLET, CHEWABLE ORAL at 09:01

## 2025-01-26 RX ADMIN — INSULIN GLARGINE 17 UNITS: 100 INJECTION, SOLUTION SUBCUTANEOUS at 08:01

## 2025-01-26 RX ADMIN — BUPRENORPHINE AND NALOXONE 3 FILM: 8; 2 FILM BUCCAL; SUBLINGUAL at 08:01

## 2025-01-26 RX ADMIN — MUPIROCIN: 20 OINTMENT TOPICAL at 08:01

## 2025-01-26 RX ADMIN — METHYLNALTREXONE BROMIDE 12 MG: 12 INJECTION, SOLUTION SUBCUTANEOUS at 02:01

## 2025-01-26 RX ADMIN — HYDROMORPHONE HYDROCHLORIDE 0.5 MG: 1 INJECTION, SOLUTION INTRAMUSCULAR; INTRAVENOUS; SUBCUTANEOUS at 08:01

## 2025-01-26 RX ADMIN — POLYETHYLENE GLYCOL 3350 17 G: 17 POWDER, FOR SOLUTION ORAL at 09:01

## 2025-01-26 RX ADMIN — CLOTRIMAZOLE AND BETAMETHASONE DIPROPIONATE: 10; .64 CREAM TOPICAL at 08:01

## 2025-01-26 RX ADMIN — INSULIN ASPART 6 UNITS: 100 INJECTION, SOLUTION INTRAVENOUS; SUBCUTANEOUS at 03:01

## 2025-01-26 RX ADMIN — CLOTRIMAZOLE AND BETAMETHASONE DIPROPIONATE: 10; .64 CREAM TOPICAL at 12:01

## 2025-01-26 RX ADMIN — ACETAMINOPHEN 650 MG: 325 TABLET ORAL at 09:01

## 2025-01-26 RX ADMIN — SIMETHICONE 80 MG: 80 TABLET, CHEWABLE ORAL at 06:01

## 2025-01-26 RX ADMIN — HYDROMORPHONE HYDROCHLORIDE 0.5 MG: 1 INJECTION, SOLUTION INTRAMUSCULAR; INTRAVENOUS; SUBCUTANEOUS at 09:01

## 2025-01-26 RX ADMIN — METHOCARBAMOL 500 MG: 500 TABLET ORAL at 04:01

## 2025-01-26 RX ADMIN — METHOCARBAMOL 500 MG: 500 TABLET ORAL at 10:01

## 2025-01-26 RX ADMIN — LIDOCAINE 2 PATCH: 50 PATCH CUTANEOUS at 04:01

## 2025-01-26 RX ADMIN — AMLODIPINE BESYLATE 5 MG: 2.5 TABLET ORAL at 12:01

## 2025-01-26 RX ADMIN — SENNOSIDES AND DOCUSATE SODIUM 1 TABLET: 50; 8.6 TABLET ORAL at 01:01

## 2025-01-26 RX ADMIN — METHOCARBAMOL 500 MG: 500 TABLET ORAL at 05:01

## 2025-01-26 RX ADMIN — POLYETHYLENE GLYCOL 3350 17 G: 17 POWDER, FOR SOLUTION ORAL at 08:01

## 2025-01-26 RX ADMIN — ALUMINUM HYDROXIDE, MAGNESIUM HYDROXIDE, AND DIMETHICONE 30 ML: 400; 400; 40 SUSPENSION ORAL at 12:01

## 2025-01-26 RX ADMIN — HYDROMORPHONE HYDROCHLORIDE 0.5 MG: 1 INJECTION, SOLUTION INTRAMUSCULAR; INTRAVENOUS; SUBCUTANEOUS at 01:01

## 2025-01-26 RX ADMIN — METOPROLOL SUCCINATE 25 MG: 25 TABLET, EXTENDED RELEASE ORAL at 08:01

## 2025-01-26 RX ADMIN — INSULIN ASPART 1 UNITS: 100 INJECTION, SOLUTION INTRAVENOUS; SUBCUTANEOUS at 10:01

## 2025-01-26 RX ADMIN — CLOTRIMAZOLE AND BETAMETHASONE DIPROPIONATE: 10; .64 CREAM TOPICAL at 09:01

## 2025-01-26 RX ADMIN — SENNOSIDES AND DOCUSATE SODIUM 1 TABLET: 50; 8.6 TABLET ORAL at 08:01

## 2025-01-26 RX ADMIN — AMLODIPINE BESYLATE 5 MG: 2.5 TABLET ORAL at 10:01

## 2025-01-26 RX ADMIN — LABETALOL HYDROCHLORIDE 10 MG: 5 INJECTION, SOLUTION INTRAVENOUS at 06:01

## 2025-01-26 RX ADMIN — HYDROMORPHONE HYDROCHLORIDE 0.5 MG: 1 INJECTION, SOLUTION INTRAMUSCULAR; INTRAVENOUS; SUBCUTANEOUS at 12:01

## 2025-01-26 RX ADMIN — MUPIROCIN: 20 OINTMENT TOPICAL at 09:01

## 2025-01-26 NOTE — PLAN OF CARE
MICU DAILY GOALS     Family/Goals of care/Code Status   Code Status: Full Code    24H Vital Sign Range  Temp:  [97.8 °F (36.6 °C)-98.6 °F (37 °C)]   Pulse:  []   Resp:  [16-34]   BP: (130-190)/(58-86)   SpO2:  [87 %-96 %]      Shift Events (include procedures and significant events)   No acute events throughout shift    AWAKE RASS: Goal -    Actual - RASS (Paz Agitation-Sedation Scale): alert and calm    Restraint necessity: Not necessary   BREATHE SBT: Not intubated    Coordinate A & B, analgesics/sedatives Pain: uncontrolled   SAT: Not intubated   Delirium CAM-ICU:     Early(intubated/ Progressive (non-intubated) Mobility MOVE Screen (INTUBATED ONLY): Not intubated    Activity: Activity Management: Rolling - L1   Feeding/Nutrition Diet order: Diet/Nutrition Received: NPO,     Thrombus DVT prophylaxis: VTE Core Measure: Per order contraindicated for SCDs/Anticoagulants   HOB Elevation Head of Bed (HOB) Positioning: HOB at 30-45 degrees   Ulcer Prophylaxis GI: yes   Glucose control managed Glycemic Management: blood glucose monitored   Skin Skin assessment:     Sacrum intact/not altered? yes  Heels intact/not altered? Yes  Surgical wound? No    CHECK ONE!   (no altered skin or altered skin) and sub boxes:  [] No Altered Skin Integrity Present    []Prevention Measures Documented    [x] Altered Skin Integrity Present or Discovered   [x] LDA present in EPIC, daily doc completed              [] LDA added if not in EPIC (describe wound).                    When describing wound, do not stage, use descriptive words only.    [] Wound Image Taken (required on admit,                   transfer/discharge and every Tuesday)    Wound Care Consulted? No   Bowel Function no issues    Indwelling Catheter Necessity          De-escalation Antibiotics No        VS and assessment per flow sheet, patient progressing towards goals as tolerated, plan of care reviewed with Patient, all concerns addressed, will continue to  monitor.

## 2025-01-26 NOTE — SUBJECTIVE & OBJECTIVE
Interval History: NAOE, hgb drifted slightly this AM receiving unit, continues to have pain, continuing bed rest, clinically stable       Objective:     Temp:  [97.8 °F (36.6 °C)-98.7 °F (37.1 °C)] 98.7 °F (37.1 °C)  Pulse:  [] 80  Resp:  [14-39] 27  SpO2:  [87 %-97 %] 95 %  BP: (137-193)/(61-91) 172/85     Body mass index is 35.63 kg/m².           Drains       None                    Physical Exam  Constitutional:       General: He is not in acute distress.     Appearance: Normal appearance.   HENT:      Head: Normocephalic and atraumatic.   Eyes:      Extraocular Movements: Extraocular movements intact.      Pupils: Pupils are equal, round, and reactive to light.   Cardiovascular:      Rate and Rhythm: Normal rate.   Pulmonary:      Effort: Pulmonary effort is normal. No respiratory distress.   Abdominal:      General: Abdomen is flat. There is no distension.      Tenderness: There is abdominal tenderness. There is no right CVA tenderness or left CVA tenderness.   Genitourinary:     Comments: Normal appearing testicles bilaterally. Circumcised   Skin:     Coloration: Skin is not jaundiced.   Neurological:      General: No focal deficit present.      Mental Status: He is alert and oriented to person, place, and time.   Psychiatric:         Mood and Affect: Mood normal.         Behavior: Behavior normal.           Significant Labs:    BMP:  Recent Labs   Lab 01/24/25  0512 01/25/25  0325 01/26/25  0413   * 135* 135*   K 5.4* 4.6 4.5    109 107   CO2 18* 19* 22*   BUN 26* 21 15   CREATININE 1.2 0.9 0.8   CALCIUM 7.6* 8.2* 8.1*       CBC:   Recent Labs   Lab 01/25/25  1131 01/25/25  1619 01/26/25  0413   WBC 10.41 8.98 8.35   HGB 7.5* 7.4* 6.8*   HCT 22.8* 22.3* 20.9*    362 346       All pertinent labs results from the past 24 hours have been reviewed.    Significant Imaging:  All pertinent imaging results/findings from the past 24 hours have been reviewed.

## 2025-01-26 NOTE — ASSESSMENT & PLAN NOTE
"63 y/o M with a medical hx of DM, hip fracture, GERD, HTN, HLD, and paroxysmal Afib who presented to [facility] as a transfer from [place] ED. Pt originally presented to [place] ED for R flank pain. He states he recently seen at [facility] for a kidney stone and thought pain was related to that.      CTA Abdomen showed: Large right perinephric and midline retroperitoneal hematomas with active contrast extravasation. Several areas of cortical hypoattenuation of the right kidney, suspicious for low-grade renal lacerations or infarcts.     IR consulted for emergent embolization. Concern for rupture of R gonadal artery, with concerns patient may lose R testicle. Admitted to MICU for concerns of hemorrhagic shock. HDS upon admission. Hgb stable at 10, baseline around 14. Now s/p IR embolization.  Urology consulted, Embolization of gonadal unlikely to cause testicular atrophy due collateral blood supply. Repeat CTA on 11/25 did not show active hemorrhage but redemonstrated large right renal subcapsular hematoma with mass effect suggesting page kidney.  IR consulted for possible drainage of hematoma.  Per IR: "Given dense appearance of hematoma on CT, a percutaneous drain is unlikely to decompress the hematoma. However, recommend right renal ultrasound to evaluate for any lower density regions of the hematoma that may be amendable to drainage."     Plan:  - follow up retroperitoneal U/S to evaluate for possible regions amenable for drainage  - Will trend hemoglobin and monitor for hemodynamic instability.  - Transfuse PRN  - holding AC meds; plan to resume if hemoglobin is stable for more than 24 hours  - work up for aquired coagulapathy.   "

## 2025-01-26 NOTE — ASSESSMENT & PLAN NOTE
Joe Morgan is a 64 y.o. male with a PMHx of pAF, HTN and T2DM presenting as a transfer from outside ED due to retroperitoneal hematomas with active contrast extravasation on CTA. Underwent angiogram and right gonadal artery embolization with IR. Urology consulted for RP bleed and concerns for testicular atrophy s/p embolization.    -- Embolization of gonadal unlikely to cause testicular atrophy due collateral blood supply  -- Trend hgb  -- Transfuse as necessary to maintain Hg >7.0.  -- If patient decompensates, or requires mass transfusions may require repeat embolization.   -- Okay for diet  -- Hold AC/AP as feasible.   -- Repeat CTA 1/24/25 showed no active bleed  -- Will need outpatient referral to [facility] to [facility]. Will need abdominal imaging to assess for renal mass  -- recommend tighter BP control  -- Bed rest   -- recommend hematology consult to evaluate for bleeding disorders

## 2025-01-26 NOTE — SUBJECTIVE & OBJECTIVE
Interval History/Significant Events: hemoglobin drop overnight, 1 unit PRBC this AM. Complaining of mild abdominal pain.     Denies Chest Pain,SAMAYOA, LOC, Falls, Trauma, hemoptysis, hematemis, melena, fever, chills, nausea, vomiting, diarrhea. Last bowl movement was 2 days ago was normal.       Review of Systems  Objective:     Vital Signs (Most Recent):  Temp: 98.7 °F (37.1 °C) (01/26/25 1100)  Pulse: 71 (01/26/25 1100)  Resp: 20 (01/26/25 1322)  BP: (!) 189/123 (01/26/25 1100)  SpO2: (!) 93 % (01/26/25 1100) Vital Signs (24h Range):  Temp:  [97.8 °F (36.6 °C)-98.8 °F (37.1 °C)] 98.7 °F (37.1 °C)  Pulse:  [71-90] 71  Resp:  [14-39] 20  SpO2:  [87 %-98 %] 93 %  BP: (151-202)/() 189/123   Weight: 112.6 kg (248 lb 4.8 oz)  Body mass index is 35.63 kg/m².      Intake/Output Summary (Last 24 hours) at 1/26/2025 1403  Last data filed at 1/26/2025 1111  Gross per 24 hour   Intake 1680 ml   Output 2625 ml   Net -945 ml          Physical Exam  Vitals and nursing note reviewed.   Constitutional:       Appearance: He is well-developed.   HENT:      Head: Normocephalic.      Mouth/Throat:      Mouth: Mucous membranes are moist.   Eyes:      Pupils: Pupils are equal, round, and reactive to light.   Cardiovascular:      Rate and Rhythm: Normal rate and regular rhythm.      Pulses: Normal pulses.   Pulmonary:      Effort: Pulmonary effort is normal.   Abdominal:      General: Bowel sounds are normal. There is no distension.      Palpations: Abdomen is soft.      Tenderness: There is no abdominal tenderness. There is no guarding or rebound.      Hernia: No hernia is present.   Musculoskeletal:         General: No swelling.   Skin:     General: Skin is warm and dry.   Neurological:      Mental Status: He is alert.   Psychiatric:         Mood and Affect: Mood normal.         Behavior: Behavior normal.         Thought Content: Thought content normal.         Judgment: Judgment normal.            Vents:     Lines/Drains/Airways        Peripheral Intravenous Line  Duration                  Peripheral IV - Single Lumen 01/23/25 18 G Right Antecubital 3 days         Peripheral IV - Single Lumen 01/23/25 2110 20 G Anterior;Distal;Left Forearm 2 days                  Significant Labs:    CBC/Anemia Profile:  Recent Labs   Lab 01/25/25  1131 01/25/25  1619 01/26/25  0413   WBC 10.41 8.98 8.35   HGB 7.5* 7.4* 6.8*   HCT 22.8* 22.3* 20.9*    362 346   MCV 86 85 87   RDW 17.1* 17.2* 16.7*        Chemistries:  Recent Labs   Lab 01/25/25  0325 01/26/25  0413   * 135*   K 4.6 4.5    107   CO2 19* 22*   BUN 21 15   CREATININE 0.9 0.8   CALCIUM 8.2* 8.1*   ALBUMIN 1.7* 1.7*   PROT 6.2 6.2   BILITOT 0.5 0.6   ALKPHOS 81 75   ALT 17 17   AST 15 22   MG 2.4 2.2   PHOS 3.2 3.1       All pertinent labs within the past 24 hours have been reviewed.    Significant Imaging:  I have reviewed all pertinent imaging results/findings within the past 24 hours.

## 2025-01-26 NOTE — PLAN OF CARE
MICU DAILY GOALS     Family/Goals of care/Code Status   Code Status: Full Code    24H Vital Sign Range  Temp:  [97.8 °F (36.6 °C)-98.6 °F (37 °C)]   Pulse:  []   Resp:  [14-29]   BP: (132-190)/(58-87)   SpO2:  [87 %-97 %]      Shift Events (include procedures and significant events)   H&H-6.8/20.9,set to receive one unit prbc this am to treat    AWAKE RASS: Goal -    Actual - RASS (Paz Agitation-Sedation Scale): alert and calm    Restraint necessity: Not necessary   BREATHE SBT: Not intubated    Coordinate A & B, analgesics/sedatives Pain: managed   SAT: Not intubated   Delirium CAM-ICU:     Early(intubated/ Progressive (non-intubated) Mobility MOVE Screen (INTUBATED ONLY): Not intubated    Activity: Activity Management: Ankle pumps - L1, Arm raise - L1, Leg kicks - L2, Rolling - L1   Feeding/Nutrition Diet order: Diet/Nutrition Received: regular,     Thrombus DVT prophylaxis: VTE Core Measure: Per order contraindicated for SCDs/Anticoagulants   HOB Elevation Head of Bed (HOB) Positioning: HOB at 30-45 degrees   Ulcer Prophylaxis GI: no   Glucose control managed Glycemic Management: blood glucose monitored   Skin Skin assessment:     Sacrum intact/not altered? Yes  Heels intact/not altered? Yes  Surgical wound? No    CHECK ONE!   (no altered skin or altered skin) and sub boxes:  [] No Altered Skin Integrity Present    []Prevention Measures Documented    [x] Altered Skin Integrity Present or Discovered   [x] LDA present in EPIC, daily doc completed              [] LDA added if not in EPIC (describe wound).                    When describing wound, do not stage, use descriptive words only.    [] Wound Image Taken (required on admit,                   transfer/discharge and every Tuesday)    Wound Care Consulted? No   Bowel Function no issues    Indwelling Catheter Necessity            De-escalation Antibiotics NA        VS and assessment per flow sheet, patient progressing towards goals as tolerated, plan  of care reviewed with Joe Morgan, all concerns addressed, will continue to monitor.

## 2025-01-26 NOTE — PROGRESS NOTES
"[facility] ICU  Critical Care Medicine  Progress Note    Patient Name: Joe Morgan  MRN: [id number]  Admission Date: 1/23/2025  Hospital Length of Stay: 3 days  Code Status: Full Code  Attending Provider: Matthew Valdivia*  Primary Care Provider: Johnnie Gibbs MD   Principal Problem: Retroperitoneal hematoma    Subjective:     HPI:  65 y/o M with a medical hx of DM, hip fracture, GERD, HTN, HLD, and paroxysmal Afib who presented to [facility] as a transfer from [facility]. Pt originally presented to [facility] for R flank pain. He states he recently seen at [facility] for a kidney stone and thought pain was related to that.      Hospital Course from [facility]:  "Admitted and treated for UTI and hematoma of the right kidney after he presented with complaints of left flank pain and subjective fever. He had a CT of his abdomen and pelvis that was significant for right kidney pyleonephritis, trace right lung base pleural effusion. He was started on IVF and antibiotics. He continued to have pain and repeat CT was obtained and significant for GB distension, small pleural effusions, left inguinal hernia and interval development of subcapsular hematoma in the right kidney. He had follow up imaging the next day and had slight interval evolution of right subcapsular hemorrhage. He was on eliquis for afib which was stopped and his H/H was monitored. His imaging was also reviewed by Dr. Pina with urology for possible transfer but he recommended the patient continued to be monitored here. Did require 2 units of pRBC and blood cell counts have stabilized. The case was discussed with Dr. Pina who has agreed the patient is stable for discharge. Plan for follow up CT in 1 week and follow up with Dr. Pina after it is complete. Pt was deemed medically stable on day of discharge."    Patient states his pain immediately returned after being released. Pain located in the R lower back " radiating around the R flank and into the R testicle. It is constant, stabbing, severe in nature. Nothing makes it better or worse. He took no medications prior to arrival treat pain. Denies any symptoms on review of systems at this time.    CTA Abdomen showed: Large right perinephric and midline retroperitoneal hematomas with active contrast extravasation. Several areas of cortical hypoattenuation of the right kidney, suspicious for low-grade renal lacerations or infarcts.     IR consulted for emergent embolization. Concern for rupture of R gonadal artery, with concerns patient may lose R testicle. Admitted to MICU for concerns of hemorrhagic shock. Uro consulted      Hospital/ICU Course:  63 y/o M w/ DM, hip fracture, GERD, HTN, HLD, and paroxysmal Afib w/ recent admission for UTI and subcapsular hematoma in the right kidney admitted for active retroperitoneal bleeding s/p embolization with c/f right gonadal artery rupture. Admitted to MICU due to high risk of decompensation 2/2 active bleed.  On 1/24, hemoglobin continued to trend down s/p 2 units of pRBCs.  Repeat CTA A/P did not show active hemorrhage but redemonstrated large right renal subcapsular hematoma with mass effect suggesting page kidney.  Started on oral hydralazine which was switched to lisinopril.  IR reconsulted for possible drainage of hematoma and recommended retroperitoneal U/S.  Hemoglobin trend stable.    Interval History/Significant Events: hemoglobin drop overnight, 1 unit PRBC this AM. Complaining of mild abdominal pain.     Denies Chest Pain,SAMAYOA, LOC, Falls, Trauma, hemoptysis, hematemis, melena, fever, chills, nausea, vomiting, diarrhea. Last bowl movement was 2 days ago was normal.       Review of Systems  Objective:     Vital Signs (Most Recent):  Temp: 98.7 °F (37.1 °C) (01/26/25 1100)  Pulse: 71 (01/26/25 1100)  Resp: 20 (01/26/25 1322)  BP: (!) 189/123 (01/26/25 1100)  SpO2: (!) 93 % (01/26/25 1100) Vital Signs (24h Range):  Temp:   [97.8 °F (36.6 °C)-98.8 °F (37.1 °C)] 98.7 °F (37.1 °C)  Pulse:  [71-90] 71  Resp:  [14-39] 20  SpO2:  [87 %-98 %] 93 %  BP: (151-202)/() 189/123   Weight: 112.6 kg (248 lb 4.8 oz)  Body mass index is 35.63 kg/m².      Intake/Output Summary (Last 24 hours) at 1/26/2025 1403  Last data filed at 1/26/2025 1111  Gross per 24 hour   Intake 1680 ml   Output 2625 ml   Net -945 ml          Physical Exam  Vitals and nursing note reviewed.   Constitutional:       Appearance: He is well-developed.   HENT:      Head: Normocephalic.      Mouth/Throat:      Mouth: Mucous membranes are moist.   Eyes:      Pupils: Pupils are equal, round, and reactive to light.   Cardiovascular:      Rate and Rhythm: Normal rate and regular rhythm.      Pulses: Normal pulses.   Pulmonary:      Effort: Pulmonary effort is normal.   Abdominal:      General: Bowel sounds are normal. There is no distension.      Palpations: Abdomen is soft.      Tenderness: There is no abdominal tenderness. There is no guarding or rebound.      Hernia: No hernia is present.   Musculoskeletal:         General: No swelling.   Skin:     General: Skin is warm and dry.   Neurological:      Mental Status: He is alert.   Psychiatric:         Mood and Affect: Mood normal.         Behavior: Behavior normal.         Thought Content: Thought content normal.         Judgment: Judgment normal.            Vents:     Lines/Drains/Airways       Peripheral Intravenous Line  Duration                  Peripheral IV - Single Lumen 01/23/25 18 G Right Antecubital 3 days         Peripheral IV - Single Lumen 01/23/25 2110 20 G Anterior;Distal;Left Forearm 2 days                  Significant Labs:    CBC/Anemia Profile:  Recent Labs   Lab 01/25/25  1131 01/25/25  1619 01/26/25  0413   WBC 10.41 8.98 8.35   HGB 7.5* 7.4* 6.8*   HCT 22.8* 22.3* 20.9*    362 346   MCV 86 85 87   RDW 17.1* 17.2* 16.7*        Chemistries:  Recent Labs   Lab 01/25/25  0325 01/26/25  0413   *  "135*   K 4.6 4.5    107   CO2 19* 22*   BUN 21 15   CREATININE 0.9 0.8   CALCIUM 8.2* 8.1*   ALBUMIN 1.7* 1.7*   PROT 6.2 6.2   BILITOT 0.5 0.6   ALKPHOS 81 75   ALT 17 17   AST 15 22   MG 2.4 2.2   PHOS 3.2 3.1       All pertinent labs within the past 24 hours have been reviewed.    Significant Imaging:  I have reviewed all pertinent imaging results/findings within the past 24 hours.    ABG  No results for input(s): "PH", "PO2", "PCO2", "HCO3", "BE" in the last 168 hours.  Assessment/Plan:     Cardiac/Vascular  Paroxysmal atrial fibrillation  Hx of paroxysmal Afib. Currently in NSR.     Plan:  - Maintain K > 4, Mag > 2 and Ca/iCal WNL to decrease arrhythmogenic potential  - Rate control with metoprolol succinate 25 mg  - Emr chart review shows both metoprolol and Flecainide at home?? Pt unsure which if any he is taking  - Anticoagulation with Eliquis at home, reports hasn't taken any within past 2 days. Will hold in setting of retroperitoneal bleed  - Maintain on telemetry     Essential hypertension  Takes lisinopril 40 mg and metoprolol succinate 25 mg as home medications. HDS upon admission and s/p embolization.     - restart lisinopril in the setting of page kidney  - add amlodipine for tighter bp control in the setting of RP bleed.     Endocrine  Type 2 diabetes mellitus  Last A1c reviewed-   Lab Results   Component Value Date    LABA1C 10.7 (H) 06/27/2018     Inpatient Antihyperglycemic Regiment:  Antihyperglycemics (From admission, onward)      Start     Stop Route Frequency Ordered    01/24/25 0900  insulin glargine U-100 (Lantus) pen 15 Units         -- SubQ Daily 01/24/25 0056    01/24/25 0715  insulin aspart U-100 pen 6 Units         -- SubQ 3 times daily with meals 01/24/25 0056    01/24/25 0155  insulin aspart U-100 pen 0-5 Units         -- SubQ Before meals & nightly PRN 01/24/25 0056          Blood Sugars (AccuCheck):    Recent Labs     01/23/25  1620   POCTGLUCOSE 297*       Obesity  Body mass " "index is 35.63 kg/m². Morbid obesity complicates all aspects of disease management from diagnostic modalities to treatment. Weight loss encouraged and health benefits explained to patient.     GI  * Retroperitoneal hematoma  63 y/o M with a medical hx of DM, hip fracture, GERD, HTN, HLD, and paroxysmal Afib who presented to [facility] as a transfer from [facility]. Pt originally presented to [facility] for R flank pain. He states he recently seen at [facility] for a kidney stone and thought pain was related to that.      CTA Abdomen showed: Large right perinephric and midline retroperitoneal hematomas with active contrast extravasation. Several areas of cortical hypoattenuation of the right kidney, suspicious for low-grade renal lacerations or infarcts.     IR consulted for emergent embolization. Concern for rupture of R gonadal artery, with concerns patient may lose R testicle. Admitted to MICU for concerns of hemorrhagic shock. HDS upon admission. Hgb stable at 10, baseline around 14. Now s/p IR embolization.  Urology consulted, Embolization of gonadal unlikely to cause testicular atrophy due collateral blood supply. Repeat CTA on 11/25 did not show active hemorrhage but redemonstrated large right renal subcapsular hematoma with mass effect suggesting page kidney.  IR consulted for possible drainage of hematoma.  Per IR: "Given dense appearance of hematoma on CT, a percutaneous drain is unlikely to decompress the hematoma. However, recommend right renal ultrasound to evaluate for any lower density regions of the hematoma that may be amendable to drainage."     Plan:  - follow up retroperitoneal U/S to evaluate for possible regions amenable for drainage  - Will trend hemoglobin and monitor for hemodynamic instability.  - Transfuse PRN  - holding AC meds; plan to resume if hemoglobin is stable for more than 24 hours  - work up for aquired coagulapathy.     Abdominal distention  Complaints of abdominal " distention and mild tenderness since 1/24.  Reports having normal bowel movements every 3-4 days.  Patient reports being able to pass gas.  CTA AP from 01/25 showed decompressed stomach with perigastric soft tissue stranding posteriorly.  No large bowel obstruction noted.  Moderate volume stool throughout the colon.    -follow up KUB  - start MiraLax b.i.d. and senna/docusate       Critical Care Daily Checklist:    A: Awake: RASS Goal/Actual Goal:    Actual:     B: Spontaneous Breathing Trial Performed?     C: SAT & SBT Coordinated?  Not intubated                      D: Delirium: CAM-ICU     E: Early Mobility Performed? No   F: Feeding Goal:    Status:     Current Diet Order   Procedures    Diet Adult Regular Consistent Carbohydrate     Order Specific Question:   Additional Diet Options:     Answer:   Consistent Carbohydrate      AS: Analgesia/Sedation Bupronorphine with dilauded for break through   T: Thromboembolic Prophylaxis contrindicated   H: HOB > 300 Yes   U: Stress Ulcer Prophylaxis (if needed) Not intubated   G: Glucose Control Increased glargine today   B: Bowel Function  senna   I: Indwelling Catheter (Lines & Hood) Necessity piv   D: De-escalation of Antimicrobials/Pharmacotherapies none    Plan for the day/ETD Trend H and H    Code Status:  Family/Goals of Care: Full Code         Critical secondary to Patient has a condition that poses threat to life and bodily function: Acute Renal Failure  Patient is currently receiving parenteral controlled substances: Dilaudid      Critical care was time spent personally by me on the following activities: development of treatment plan with patient or surrogate and bedside caregivers, discussions with consultants, evaluation of patient's response to treatment, examination of patient, ordering and performing treatments and interventions, ordering and review of laboratory studies, ordering and review of radiographic studies, pulse oximetry, re-evaluation of  patient's condition. This critical care time did not overlap with that of any other provider or involve time for any procedures.     Sadi Stratton MD  Critical Care Medicine  [facility] - Medical ICU

## 2025-01-26 NOTE — ASSESSMENT & PLAN NOTE
Takes lisinopril 40 mg and metoprolol succinate 25 mg as home medications. HDS upon admission and s/p embolization.     - restart lisinopril in the setting of page kidney  - add amlodipine for tighter bp control in the setting of RP bleed.

## 2025-01-26 NOTE — PROGRESS NOTES
[facility] - Medical ICU  Urology  Progress Note    Patient Name: Joe Morgan  MRN: [id number]  Admission Date: 1/23/2025  Hospital Length of Stay: 3 days  Code Status: Full Code   Attending Provider: Matthew Valdivia*   Primary Care Physician: Johnnie Gibbs MD    Subjective:     HPI:  Joe Morgan is a 64 y.o. male with a PMHx of pAF, HTN and T2DM presenting as a transfer from outside ED due to retroperitoneal hematomas with active contrast extravasation on CTA. Underwent angiogram and right gonadal artery embolization with IR. Urology consulted for RP bleed and concerns for testicular atrophy s/p embolization.    On assessment the patient is AFVSS. States that he originally presented [facility] ED for right flank pain and CTA found to have right perinephric and retroperitoneal hematoma. Also passed a presumed kidney stone in the ED yesterday. Has never seen a urologist in the past. Denies fevers, chills, hematuria.     WBC 14.58, Hg at 10.0, received 1 upRBC at OSH. Cr is 1.4 (baseline appears 0.9-1.3). UA nitrite negative, 6 RBC, 47 WBC and few bacteria with 2 squamous cells.     CT GI bleed showed contrast in the collecting system, no obvious hydronephrosis, or stone, right perinephric and retroperitoneal hematoma present.             Interval History: NAOE, hgb drifted slightly this AM receiving unit, continues to have pain, continuing bed rest, clinically stable       Objective:     Temp:  [97.8 °F (36.6 °C)-98.7 °F (37.1 °C)] 98.7 °F (37.1 °C)  Pulse:  [] 80  Resp:  [14-39] 27  SpO2:  [87 %-97 %] 95 %  BP: (137-193)/(61-91) 172/85     Body mass index is 35.63 kg/m².           Drains       None                    Physical Exam  Constitutional:       General: He is not in acute distress.     Appearance: Normal appearance.   HENT:      Head: Normocephalic and atraumatic.   Eyes:      Extraocular Movements: Extraocular movements intact.      Pupils: Pupils are equal, round, and reactive to  light.   Cardiovascular:      Rate and Rhythm: Normal rate.   Pulmonary:      Effort: Pulmonary effort is normal. No respiratory distress.   Abdominal:      General: Abdomen is flat. There is no distension.      Tenderness: There is abdominal tenderness. There is no right CVA tenderness or left CVA tenderness.   Genitourinary:     Comments: Normal appearing testicles bilaterally. Circumcised   Skin:     Coloration: Skin is not jaundiced.   Neurological:      General: No focal deficit present.      Mental Status: He is alert and oriented to person, place, and time.   Psychiatric:         Mood and Affect: Mood normal.         Behavior: Behavior normal.           Significant Labs:    BMP:  Recent Labs   Lab 01/24/25  0512 01/25/25  0325 01/26/25  0413   * 135* 135*   K 5.4* 4.6 4.5    109 107   CO2 18* 19* 22*   BUN 26* 21 15   CREATININE 1.2 0.9 0.8   CALCIUM 7.6* 8.2* 8.1*       CBC:   Recent Labs   Lab 01/25/25  1131 01/25/25  1619 01/26/25  0413   WBC 10.41 8.98 8.35   HGB 7.5* 7.4* 6.8*   HCT 22.8* 22.3* 20.9*    362 346       All pertinent labs results from the past 24 hours have been reviewed.    Significant Imaging:  All pertinent imaging results/findings from the past 24 hours have been reviewed.                  Assessment/Plan:     * Retroperitoneal hematoma  Joe Morgan is a 64 y.o. male with a PMHx of pAF, HTN and T2DM presenting as a transfer from outside ED due to retroperitoneal hematomas with active contrast extravasation on CTA. Underwent angiogram and right gonadal artery embolization with IR. Urology consulted for RP bleed and concerns for testicular atrophy s/p embolization.    -- Embolization of gonadal unlikely to cause testicular atrophy due collateral blood supply  -- Trend hgb  -- Transfuse as necessary to maintain Hg >7.0.  -- If patient decompensates, or requires mass transfusions may require repeat embolization.   -- Okay for diet  -- Hold AC/AP as feasible.   --  Repeat CTA 1/24/25 showed no active bleed  -- Will need outpatient referral to Urology to establish care in [place]. Will need abdominal imaging to assess for renal mass  -- recommend tighter BP control  -- Bed rest   -- recommend hematology consult to evaluate for bleeding disorders         VTE Risk Mitigation (From admission, onward)           Ordered     Reason for No Pharmacological VTE Prophylaxis  Once        Question:  Reasons:  Answer:  Active Bleeding    01/23/25 2338     IP VTE HIGH RISK PATIENT  Once         01/23/25 2318     Place sequential compression device  Until discontinued         01/23/25 2318                    Librado Mahmood MD  Urology  [facility] - Medical ICU

## 2025-01-26 NOTE — PLAN OF CARE
MICU DAILY GOALS     Family/Goals of care/Code Status   Code Status: Full Code    24H Vital Sign Range  Temp:  [98.2 °F (36.8 °C)-98.8 °F (37.1 °C)]   Pulse:  [68-90]   Resp:  [14-39]   BP: (151-202)/()   SpO2:  [89 %-98 %]      Shift Events (include procedures and significant events)   No acute events throughout shift    AWAKE RASS: Goal -    Actual - RASS (Paz Agitation-Sedation Scale): alert and calm    Restraint necessity: Not necessary   BREATHE SBT: Not intubated    Coordinate A & B, analgesics/sedatives Pain: managed   SAT: Not intubated   Delirium CAM-ICU:     Early(intubated/ Progressive (non-intubated) Mobility MOVE Screen (INTUBATED ONLY): Not intubated    Activity: Activity Management: Sitting at edge of bed - L2   Feeding/Nutrition Diet order: Diet/Nutrition Received: consistent carb/diabetic diet,     Thrombus DVT prophylaxis: VTE Core Measure: Per order contraindicated for SCDs/Anticoagulants   HOB Elevation Head of Bed (HOB) Positioning: HOB at 30-45 degrees   Ulcer Prophylaxis GI: no   Glucose control managed Glycemic Management: blood glucose monitored   Skin Skin assessment:     Sacrum intact/not altered? Yes  Heels intact/not altered? Yes  Surgical wound? Yes    CHECK ONE!   (no altered skin or altered skin) and sub boxes:  [] No Altered Skin Integrity Present    []Prevention Measures Documented    [x] Altered Skin Integrity Present or Discovered   [x] LDA present in EPIC, daily doc completed              [] LDA added if not in EPIC (describe wound).                    When describing wound, do not stage, use descriptive words only.    [] Wound Image Taken (required on admit,                   transfer/discharge and every Tuesday)    Wound Care Consulted? No   Bowel Function constipation    Indwelling Catheter Necessity          De-escalation Antibiotics Yes        VS and assessment per flow sheet, patient progressing towards goals as tolerated, plan of care reviewed with family, all  concerns addressed, will continue to monitor.

## 2025-01-27 LAB
ALBUMIN SERPL BCP-MCNC: 2 G/DL (ref 3.5–5.2)
ALP SERPL-CCNC: 89 U/L (ref 40–150)
ALT SERPL W/O P-5'-P-CCNC: 23 U/L (ref 10–44)
ANION GAP SERPL CALC-SCNC: 8 MMOL/L (ref 8–16)
AST SERPL-CCNC: 24 U/L (ref 10–40)
BACTERIA #/AREA URNS AUTO: NORMAL /HPF
BILIRUB SERPL-MCNC: 0.7 MG/DL (ref 0.1–1)
BILIRUB UR QL STRIP: NEGATIVE
BLD PROD TYP BPU: NORMAL
BLD PROD TYP BPU: NORMAL
BLOOD UNIT EXPIRATION DATE: NORMAL
BLOOD UNIT EXPIRATION DATE: NORMAL
BLOOD UNIT TYPE CODE: 6200
BLOOD UNIT TYPE CODE: 6200
BLOOD UNIT TYPE: NORMAL
BLOOD UNIT TYPE: NORMAL
BUN SERPL-MCNC: 14 MG/DL (ref 8–23)
CALCIUM SERPL-MCNC: 9.1 MG/DL (ref 8.7–10.5)
CHLORIDE SERPL-SCNC: 103 MMOL/L (ref 95–110)
CLARITY UR REFRACT.AUTO: CLEAR
CO2 SERPL-SCNC: 24 MMOL/L (ref 23–29)
CODING SYSTEM: NORMAL
CODING SYSTEM: NORMAL
COLOR UR AUTO: COLORLESS
CREAT SERPL-MCNC: 0.8 MG/DL (ref 0.5–1.4)
CROSSMATCH INTERPRETATION: NORMAL
CROSSMATCH INTERPRETATION: NORMAL
DISPENSE STATUS: NORMAL
DISPENSE STATUS: NORMAL
ERYTHROCYTE [DISTWIDTH] IN BLOOD BY AUTOMATED COUNT: 15.9 % (ref 11.5–14.5)
ERYTHROCYTE [DISTWIDTH] IN BLOOD BY AUTOMATED COUNT: 16 % (ref 11.5–14.5)
EST. GFR  (NO RACE VARIABLE): >60 ML/MIN/1.73 M^2
FACT VIII ACT/NOR PPP: 491 % (ref 60–170)
FACT VIII ACT/NOR PPP: >500 % (ref 60–170)
GLUCOSE SERPL-MCNC: 202 MG/DL (ref 70–110)
GLUCOSE UR QL STRIP: ABNORMAL
HCT VFR BLD AUTO: 26.4 % (ref 40–54)
HCT VFR BLD AUTO: 27.4 % (ref 40–54)
HGB BLD-MCNC: 8.6 G/DL (ref 14–18)
HGB BLD-MCNC: 9 G/DL (ref 14–18)
HGB UR QL STRIP: ABNORMAL
KETONES UR QL STRIP: NEGATIVE
LEUKOCYTE ESTERASE UR QL STRIP: NEGATIVE
MAGNESIUM SERPL-MCNC: 2.2 MG/DL (ref 1.6–2.6)
MCH RBC QN AUTO: 28.2 PG (ref 27–31)
MCH RBC QN AUTO: 28.5 PG (ref 27–31)
MCHC RBC AUTO-ENTMCNC: 32.6 G/DL (ref 32–36)
MCHC RBC AUTO-ENTMCNC: 32.8 G/DL (ref 32–36)
MCV RBC AUTO: 87 FL (ref 82–98)
MCV RBC AUTO: 87 FL (ref 82–98)
MICROSCOPIC COMMENT: NORMAL
NITRITE UR QL STRIP: NEGATIVE
PH UR STRIP: 8 [PH] (ref 5–8)
PHOSPHATE SERPL-MCNC: 3.8 MG/DL (ref 2.7–4.5)
PLATELET # BLD AUTO: 405 K/UL (ref 150–450)
PLATELET # BLD AUTO: 450 K/UL (ref 150–450)
PMV BLD AUTO: 8.7 FL (ref 9.2–12.9)
PMV BLD AUTO: 8.9 FL (ref 9.2–12.9)
POCT GLUCOSE: 200 MG/DL (ref 70–110)
POCT GLUCOSE: 209 MG/DL (ref 70–110)
POCT GLUCOSE: 251 MG/DL (ref 70–110)
POCT GLUCOSE: 268 MG/DL (ref 70–110)
POTASSIUM SERPL-SCNC: 4.8 MMOL/L (ref 3.5–5.1)
PROT SERPL-MCNC: 7.3 G/DL (ref 6–8.4)
PROT UR QL STRIP: ABNORMAL
RBC # BLD AUTO: 3.05 M/UL (ref 4.6–6.2)
RBC # BLD AUTO: 3.16 M/UL (ref 4.6–6.2)
RBC #/AREA URNS AUTO: 1 /HPF (ref 0–4)
SODIUM SERPL-SCNC: 135 MMOL/L (ref 136–145)
SP GR UR STRIP: 1.01 (ref 1–1.03)
SQUAMOUS #/AREA URNS AUTO: 1 /HPF
TRANS ERYTHROCYTES VOL PATIENT: NORMAL ML
TRANS ERYTHROCYTES VOL PATIENT: NORMAL ML
URN SPEC COLLECT METH UR: ABNORMAL
WBC # BLD AUTO: 8.49 K/UL (ref 3.9–12.7)
WBC # BLD AUTO: 8.91 K/UL (ref 3.9–12.7)
WBC #/AREA URNS AUTO: 2 /HPF (ref 0–5)
YEAST UR QL AUTO: NORMAL

## 2025-01-27 PROCEDURE — 85240 CLOT FACTOR VIII AHG 1 STAGE: CPT

## 2025-01-27 PROCEDURE — 81001 URINALYSIS AUTO W/SCOPE: CPT

## 2025-01-27 PROCEDURE — 83735 ASSAY OF MAGNESIUM: CPT

## 2025-01-27 PROCEDURE — 25000003 PHARM REV CODE 250: Performed by: STUDENT IN AN ORGANIZED HEALTH CARE EDUCATION/TRAINING PROGRAM

## 2025-01-27 PROCEDURE — 25000003 PHARM REV CODE 250

## 2025-01-27 PROCEDURE — 63600175 PHARM REV CODE 636 W HCPCS

## 2025-01-27 PROCEDURE — 11000001 HC ACUTE MED/SURG PRIVATE ROOM

## 2025-01-27 PROCEDURE — 80053 COMPREHEN METABOLIC PANEL: CPT

## 2025-01-27 PROCEDURE — 85246 CLOT FACTOR VIII VW ANTIGEN: CPT

## 2025-01-27 PROCEDURE — 25000003 PHARM REV CODE 250: Performed by: INTERNAL MEDICINE

## 2025-01-27 PROCEDURE — 85027 COMPLETE CBC AUTOMATED: CPT | Mod: 91

## 2025-01-27 PROCEDURE — 99291 CRITICAL CARE FIRST HOUR: CPT | Mod: ,,, | Performed by: INTERNAL MEDICINE

## 2025-01-27 PROCEDURE — 84100 ASSAY OF PHOSPHORUS: CPT

## 2025-01-27 PROCEDURE — 94761 N-INVAS EAR/PLS OXIMETRY MLT: CPT

## 2025-01-27 PROCEDURE — 63600175 PHARM REV CODE 636 W HCPCS: Mod: JZ,TB

## 2025-01-27 RX ORDER — LISINOPRIL 20 MG/1
40 TABLET ORAL DAILY
Status: DISCONTINUED | OUTPATIENT
Start: 2025-01-27 | End: 2025-02-01 | Stop reason: HOSPADM

## 2025-01-27 RX ORDER — HYDROMORPHONE HYDROCHLORIDE 1 MG/ML
0.5 INJECTION, SOLUTION INTRAMUSCULAR; INTRAVENOUS; SUBCUTANEOUS ONCE
Status: COMPLETED | OUTPATIENT
Start: 2025-01-27 | End: 2025-01-27

## 2025-01-27 RX ORDER — HYDRALAZINE HYDROCHLORIDE 20 MG/ML
10 INJECTION INTRAMUSCULAR; INTRAVENOUS ONCE
Status: COMPLETED | OUTPATIENT
Start: 2025-01-27 | End: 2025-01-27

## 2025-01-27 RX ORDER — HYDROMORPHONE HYDROCHLORIDE 1 MG/ML
0.5 INJECTION, SOLUTION INTRAMUSCULAR; INTRAVENOUS; SUBCUTANEOUS EVERY 6 HOURS PRN
Status: DISCONTINUED | OUTPATIENT
Start: 2025-01-27 | End: 2025-01-28

## 2025-01-27 RX ORDER — INSULIN GLARGINE 100 [IU]/ML
19 INJECTION, SOLUTION SUBCUTANEOUS NIGHTLY
Status: DISCONTINUED | OUTPATIENT
Start: 2025-01-27 | End: 2025-01-28

## 2025-01-27 RX ORDER — OXYBUTYNIN CHLORIDE 5 MG/1
5 TABLET, EXTENDED RELEASE ORAL DAILY
Status: DISCONTINUED | OUTPATIENT
Start: 2025-01-27 | End: 2025-02-01 | Stop reason: HOSPADM

## 2025-01-27 RX ORDER — INSULIN ASPART 100 [IU]/ML
0-10 INJECTION, SOLUTION INTRAVENOUS; SUBCUTANEOUS
Status: DISCONTINUED | OUTPATIENT
Start: 2025-01-27 | End: 2025-01-29

## 2025-01-27 RX ORDER — OXYCODONE HYDROCHLORIDE 5 MG/1
5 TABLET ORAL EVERY 6 HOURS
Status: DISCONTINUED | OUTPATIENT
Start: 2025-01-27 | End: 2025-01-28

## 2025-01-27 RX ORDER — OXYBUTYNIN CHLORIDE 5 MG/1
5 TABLET ORAL ONCE
Status: COMPLETED | OUTPATIENT
Start: 2025-01-27 | End: 2025-01-27

## 2025-01-27 RX ADMIN — INSULIN ASPART 3 UNITS: 100 INJECTION, SOLUTION INTRAVENOUS; SUBCUTANEOUS at 05:01

## 2025-01-27 RX ADMIN — OXYCODONE 5 MG: 5 TABLET ORAL at 11:01

## 2025-01-27 RX ADMIN — OXYCODONE 5 MG: 5 TABLET ORAL at 10:01

## 2025-01-27 RX ADMIN — POLYETHYLENE GLYCOL 3350 17 G: 17 POWDER, FOR SOLUTION ORAL at 08:01

## 2025-01-27 RX ADMIN — HYDROMORPHONE HYDROCHLORIDE 0.5 MG: 1 INJECTION, SOLUTION INTRAMUSCULAR; INTRAVENOUS; SUBCUTANEOUS at 06:01

## 2025-01-27 RX ADMIN — LISINOPRIL 40 MG: 20 TABLET ORAL at 09:01

## 2025-01-27 RX ADMIN — OXYBUTYNIN CHLORIDE 5 MG: 5 TABLET ORAL at 06:01

## 2025-01-27 RX ADMIN — HYDROMORPHONE HYDROCHLORIDE 0.5 MG: 1 INJECTION, SOLUTION INTRAMUSCULAR; INTRAVENOUS; SUBCUTANEOUS at 09:01

## 2025-01-27 RX ADMIN — METHOCARBAMOL 500 MG: 500 TABLET ORAL at 03:01

## 2025-01-27 RX ADMIN — AMLODIPINE BESYLATE 10 MG: 10 TABLET ORAL at 08:01

## 2025-01-27 RX ADMIN — INSULIN ASPART 6 UNITS: 100 INJECTION, SOLUTION INTRAVENOUS; SUBCUTANEOUS at 07:01

## 2025-01-27 RX ADMIN — LIDOCAINE 2 PATCH: 50 PATCH CUTANEOUS at 04:01

## 2025-01-27 RX ADMIN — HYDROMORPHONE HYDROCHLORIDE 0.5 MG: 1 INJECTION, SOLUTION INTRAMUSCULAR; INTRAVENOUS; SUBCUTANEOUS at 02:01

## 2025-01-27 RX ADMIN — CLOTRIMAZOLE AND BETAMETHASONE DIPROPIONATE: 10; .64 CREAM TOPICAL at 08:01

## 2025-01-27 RX ADMIN — INSULIN ASPART 1 UNITS: 100 INJECTION, SOLUTION INTRAVENOUS; SUBCUTANEOUS at 09:01

## 2025-01-27 RX ADMIN — SENNOSIDES AND DOCUSATE SODIUM 1 TABLET: 50; 8.6 TABLET ORAL at 08:01

## 2025-01-27 RX ADMIN — INSULIN ASPART 2 UNITS: 100 INJECTION, SOLUTION INTRAVENOUS; SUBCUTANEOUS at 07:01

## 2025-01-27 RX ADMIN — HYDRALAZINE HYDROCHLORIDE 10 MG: 20 INJECTION, SOLUTION INTRAMUSCULAR; INTRAVENOUS at 05:01

## 2025-01-27 RX ADMIN — METHOCARBAMOL 500 MG: 500 TABLET ORAL at 06:01

## 2025-01-27 RX ADMIN — METHOCARBAMOL 500 MG: 500 TABLET ORAL at 12:01

## 2025-01-27 RX ADMIN — METOPROLOL SUCCINATE 25 MG: 25 TABLET, EXTENDED RELEASE ORAL at 08:01

## 2025-01-27 RX ADMIN — BUPRENORPHINE AND NALOXONE 3 FILM: 8; 2 FILM BUCCAL; SUBLINGUAL at 08:01

## 2025-01-27 RX ADMIN — INSULIN GLARGINE 19 UNITS: 100 INJECTION, SOLUTION SUBCUTANEOUS at 09:01

## 2025-01-27 RX ADMIN — METHOCARBAMOL 500 MG: 500 TABLET ORAL at 09:01

## 2025-01-27 RX ADMIN — OXYCODONE 5 MG: 5 TABLET ORAL at 05:01

## 2025-01-27 RX ADMIN — HYDROMORPHONE HYDROCHLORIDE 0.5 MG: 1 INJECTION, SOLUTION INTRAMUSCULAR; INTRAVENOUS; SUBCUTANEOUS at 04:01

## 2025-01-27 RX ADMIN — MUPIROCIN: 20 OINTMENT TOPICAL at 09:01

## 2025-01-27 RX ADMIN — MUPIROCIN: 20 OINTMENT TOPICAL at 08:01

## 2025-01-27 RX ADMIN — HYDROMORPHONE HYDROCHLORIDE 0.5 MG: 1 INJECTION, SOLUTION INTRAMUSCULAR; INTRAVENOUS; SUBCUTANEOUS at 01:01

## 2025-01-27 RX ADMIN — OXYBUTYNIN CHLORIDE 5 MG: 5 TABLET, EXTENDED RELEASE ORAL at 12:01

## 2025-01-27 RX ADMIN — INSULIN GLARGINE 17 UNITS: 100 INJECTION, SOLUTION SUBCUTANEOUS at 08:01

## 2025-01-27 RX ADMIN — POLYETHYLENE GLYCOL 3350 17 G: 17 POWDER, FOR SOLUTION ORAL at 09:01

## 2025-01-27 NOTE — PLAN OF CARE
MICU DAILY GOALS     Family/Goals of care/Code Status   Code Status: Full Code    24H Vital Sign Range  Temp:  [97.8 °F (36.6 °C)-98.8 °F (37.1 °C)]   Pulse:  [60-99]   Resp:  [12-39]   BP: (155-202)/()   SpO2:  [90 %-98 %]      Shift Events (include procedures and significant events)   No acute events throughout shift    AWAKE RASS: Goal -    Actual - RASS (Paz Agitation-Sedation Scale): alert and calm    Restraint necessity: Not necessary   BREATHE SBT: Not intubated    Coordinate A & B, analgesics/sedatives Pain: managed   SAT: Not intubated   Delirium CAM-ICU:     Early(intubated/ Progressive (non-intubated) Mobility MOVE Screen (INTUBATED ONLY): Not intubated    Activity: Activity Management: Sitting at edge of bed - L2   Feeding/Nutrition Diet order: Diet/Nutrition Received: consistent carb/diabetic diet,     Thrombus DVT prophylaxis: VTE Core Measure: Per order contraindicated for SCDs/Anticoagulants   HOB Elevation Head of Bed (HOB) Positioning: HOB at 30-45 degrees   Ulcer Prophylaxis GI: no   Glucose control managed Glycemic Management: blood glucose monitored   Skin Skin assessment:     Sacrum intact/not altered? Yes  Heels intact/not altered? Yes  Surgical wound?Yes      CHECK ONE!   (no altered skin or altered skin) and sub boxes:  [] No Altered Skin Integrity Present    []Prevention Measures Documented    [x] Altered Skin Integrity Present or Discovered   [x] LDA present in EPIC, daily doc completed              [] LDA added if not in EPIC (describe wound).                    When describing wound, do not stage, use descriptive words only.    [] Wound Image Taken (required on admit,                   transfer/discharge and every Tuesday)    Wound Care Consulted? No   Bowel Function constipation    Indwelling Catheter Necessity            De-escalation Antibiotics NA        VS and assessment per flow sheet, patient progressing towards goals as tolerated, plan of care reviewed with Chalo Morgan  all concerns addressed, will continue to monitor.

## 2025-01-27 NOTE — PROGRESS NOTES
[facility] - Medical ICU  Urology  Progress Note    Patient Name: Joe Morgan  MRN: [id number]  Admission Date: 1/23/2025  Hospital Length of Stay: 4 days  Code Status: Full Code   Attending Provider: Ron Mark MD   Primary Care Physician: Johnnie iGbbs MD    Subjective:     HPI:  Joe Morgan is a 64 y.o. male with a PMHx of pAF, HTN and T2DM presenting as a transfer from outside ED due to retroperitoneal hematomas with active contrast extravasation on CTA. Underwent angiogram and right gonadal artery embolization with IR. Urology consulted for RP bleed and concerns for testicular atrophy s/p embolization.    On assessment the patient is AFVSS. States that he originally presented [facility] ED for right flank pain and CTA found to have right perinephric and retroperitoneal hematoma. Also passed a presumed kidney stone in the ED yesterday. Has never seen a urologist in the past. Denies fevers, chills, hematuria.     WBC 14.58, Hg at 10.0, received 1 upRBC at OSH. Cr is 1.4 (baseline appears 0.9-1.3). UA nitrite negative, 6 RBC, 47 WBC and few bacteria with 2 squamous cells.     CT GI bleed showed contrast in the collecting system, no obvious hydronephrosis, or stone, right perinephric and retroperitoneal hematoma present.             Interval History: NAOE, Hgb has been stable since transfusion yesterday morning, does endorse some pain with urination today of what seems like bladder spasms, will try dose of ditropan and get UA      Objective:     Temp:  [97.8 °F (36.6 °C)-98.8 °F (37.1 °C)] 97.8 °F (36.6 °C)  Pulse:  [60-99] 67  Resp:  [12-39] 15  SpO2:  [90 %-98 %] 97 %  BP: (155-202)/() 160/78     Body mass index is 35.63 kg/m².           Drains       None                    Physical Exam  Constitutional:       General: He is not in acute distress.     Appearance: Normal appearance.   HENT:      Head: Normocephalic and atraumatic.   Eyes:      Extraocular Movements: Extraocular movements  intact.      Pupils: Pupils are equal, round, and reactive to light.   Cardiovascular:      Rate and Rhythm: Normal rate.   Pulmonary:      Effort: Pulmonary effort is normal. No respiratory distress.   Abdominal:      General: Abdomen is flat. There is no distension.      Tenderness: There is abdominal tenderness. There is no right CVA tenderness or left CVA tenderness.   Genitourinary:     Comments: Normal appearing testicles bilaterally. Circumcised   Skin:     Coloration: Skin is not jaundiced.   Neurological:      General: No focal deficit present.      Mental Status: He is alert and oriented to person, place, and time.   Psychiatric:         Mood and Affect: Mood normal.         Behavior: Behavior normal.           Significant Labs:    BMP:  Recent Labs   Lab 01/25/25  0325 01/26/25  0413 01/27/25  0308   * 135* 135*   K 4.6 4.5 4.8    107 103   CO2 19* 22* 24   BUN 21 15 14   CREATININE 0.9 0.8 0.8   CALCIUM 8.2* 8.1* 9.1       CBC:   Recent Labs   Lab 01/26/25  0413 01/26/25  1544 01/27/25  0308   WBC 8.35 10.51 8.91   HGB 6.8* 8.5* 8.6*   HCT 20.9* 25.9* 26.4*    397 405       All pertinent labs results from the past 24 hours have been reviewed.    Significant Imaging:  All pertinent imaging results/findings from the past 24 hours have been reviewed.                  Assessment/Plan:     * Retroperitoneal hematoma  Joe Morgan is a 64 y.o. male with a PMHx of pAF, HTN and T2DM presenting as a transfer from outside ED due to retroperitoneal hematomas with active contrast extravasation on CTA. Underwent angiogram and right gonadal artery embolization with IR. Urology consulted for RP bleed and concerns for testicular atrophy s/p embolization.    -- Embolization of gonadal unlikely to cause testicular atrophy due collateral blood supply  -- Trend hgb  -- Transfuse as necessary to maintain Hg >7.0.  -- If patient decompensates, or requires mass transfusions may require repeat  embolization.   -- Okay for diet  -- Hold AC/AP as feasible.   -- Repeat CTA 1/24/25 showed no active bleed  -- Will need outpatient referral to Urology to establish care in [place]. Will need abdominal imaging to assess for renal mass  -- recommend tighter BP control  -- okay for OOB when Hgb stable for 24 hours  -- ok for ditropan for bladder spasms  -- UA/UCx   -- recommend hematology consult to evaluate for bleeding disorders         VTE Risk Mitigation (From admission, onward)           Ordered     Reason for No Pharmacological VTE Prophylaxis  Once        Question:  Reasons:  Answer:  Active Bleeding    01/23/25 2338     IP VTE HIGH RISK PATIENT  Once         01/23/25 2318     Place sequential compression device  Until discontinued         01/23/25 2318                    Librado Mahmood MD  Urology  [facility] - Medical ICU

## 2025-01-27 NOTE — ASSESSMENT & PLAN NOTE
Joe Morgan is a 64 y.o. male with a PMHx of pAF, HTN and T2DM presenting as a transfer from outside ED due to retroperitoneal hematomas with active contrast extravasation on CTA. Underwent angiogram and right gonadal artery embolization with IR. Urology consulted for RP bleed and concerns for testicular atrophy s/p embolization.    -- Embolization of gonadal unlikely to cause testicular atrophy due collateral blood supply  -- Trend hgb  -- Transfuse as necessary to maintain Hg >7.0.  -- If patient decompensates, or requires mass transfusions may require repeat embolization.   -- Okay for diet  -- Hold AC/AP as feasible.   -- Repeat CTA 1/24/25 showed no active bleed  -- Will need outpatient referral to Urology to establish care in [facility]. Will need abdominal imaging to assess for renal mass  -- recommend tighter BP control  -- okay for OOB when Hgb stable for 24 hours  -- ok for ditropan for bladder spasms  -- UA/UCx   -- recommend hematology consult to evaluate for bleeding disorders

## 2025-01-27 NOTE — PLAN OF CARE
Hospital medicine Team I will assume care when the patient is downgraded from ICU to a floor bed and is moved to that bed.

## 2025-01-27 NOTE — PLAN OF CARE
MICU DAILY GOALS     Family/Goals of care/Code Status   Code Status: Full Code    24H Vital Sign Range  Temp:  [97.8 °F (36.6 °C)-98.8 °F (37.1 °C)]   Pulse:  [60-99]   Resp:  [12-26]   BP: (146-189)/(67-90)   SpO2:  [92 %-100 %]      Shift Events (include procedures and significant events)   No acute events throughout shift Stepdown in place. -180s this afternoon, 10mg hydralazine given with improve to 140-150s. Per pt, pain improving with oxy 5mg this morning but not in the afternoon, Dr. Ortiz notified, one time 0.5mg dilaudid given    AWAKE RASS: Goal -    Actual - RASS (Paz Agitation-Sedation Scale): alert and calm    Restraint necessity: Not necessary   BREATHE SBT: Not intubated    Coordinate A & B, analgesics/sedatives Pain: managed   SAT: Not intubated   Delirium CAM-ICU:     Early(intubated/ Progressive (non-intubated) Mobility MOVE Screen (INTUBATED ONLY): Not intubated    Activity: Activity Management: Ambulated to bathroom - L4   Feeding/Nutrition Diet order: Diet/Nutrition Received: consistent carb/diabetic diet,     Thrombus DVT prophylaxis: VTE Core Measure: Per order contraindicated for SCDs/Anticoagulants   HOB Elevation Head of Bed (HOB) Positioning: HOB at 30-45 degrees   Ulcer Prophylaxis GI: yes   Glucose control managed Glycemic Management: blood glucose monitored   Skin Skin assessment:     Sacrum intact/not altered? Yes  Heels intact/not altered? Yes  Surgical wound? No    CHECK ONE!   (no altered skin or altered skin) and sub boxes:  [] No Altered Skin Integrity Present    []Prevention Measures Documented    [x] Altered Skin Integrity Present or Discovered   [x] LDA present in EPIC, daily doc completed              [] LDA added if not in EPIC (describe wound).                    When describing wound, do not stage, use descriptive words only.    [] Wound Image Taken (required on admit,                   transfer/discharge and every Tuesday)    Wound Care Consulted? No   Bowel  Function no issues    Indwelling Catheter Necessity         no   De-escalation Antibiotics No        VS and assessment per flow sheet, patient progressing towards goals as tolerated, plan of care reviewed with  pt , all concerns addressed, will continue to monitor.

## 2025-01-27 NOTE — SUBJECTIVE & OBJECTIVE
Interval History/Significant Events: Complaining of persistent right flank pain and painful bladder spasms when urination is ceased.      Review of Systems   All other systems reviewed and are negative.    Objective:     Vital Signs (Most Recent):  Temp: 98.3 °F (36.8 °C) (01/27/25 1105)  Pulse: 69 (01/27/25 1215)  Resp: 16 (01/27/25 1215)  BP: (!) 169/78 (01/27/25 1215)  SpO2: 99 % (01/27/25 1215) Vital Signs (24h Range):  Temp:  [97.8 °F (36.6 °C)-98.7 °F (37.1 °C)] 98.3 °F (36.8 °C)  Pulse:  [60-99] 69  Resp:  [12-26] 16  SpO2:  [92 %-100 %] 99 %  BP: (155-192)/(67-90) 169/78   Weight: 112.6 kg (248 lb 4.8 oz)  Body mass index is 35.63 kg/m².      Intake/Output Summary (Last 24 hours) at 1/27/2025 1415  Last data filed at 1/27/2025 0837  Gross per 24 hour   Intake 990 ml   Output 5470 ml   Net -4480 ml          Physical Exam  Vitals and nursing note reviewed.   Constitutional:       Appearance: He is well-developed.   HENT:      Head: Normocephalic.      Mouth/Throat:      Mouth: Mucous membranes are moist.   Eyes:      Pupils: Pupils are equal, round, and reactive to light.   Cardiovascular:      Rate and Rhythm: Normal rate and regular rhythm.      Pulses: Normal pulses.   Pulmonary:      Effort: Pulmonary effort is normal.   Abdominal:      General: Bowel sounds are normal. There is no distension.      Palpations: Abdomen is soft.      Tenderness: There is no abdominal tenderness. There is no guarding or rebound.      Hernia: No hernia is present.   Musculoskeletal:         General: No swelling.   Skin:     General: Skin is warm and dry.   Neurological:      Mental Status: He is alert.   Psychiatric:         Mood and Affect: Mood normal.         Behavior: Behavior normal.         Thought Content: Thought content normal.         Judgment: Judgment normal.        Vents:     Lines/Drains/Airways       Peripheral Intravenous Line  Duration                  Peripheral IV - Single Lumen 01/23/25 2110 20 G  Anterior;Distal;Left Forearm 3 days         Peripheral IV - Single Lumen 01/26/25 1726 18 G 1 in Anterior;Right Upper Arm <1 day         Peripheral IV - Single Lumen 01/27/25 1209 20 G 1 3/4 in Anterior;Left;Proximal Forearm <1 day                  Significant Labs:    CBC/Anemia Profile:  Recent Labs   Lab 01/26/25  0413 01/26/25  1544 01/27/25  0308   WBC 8.35 10.51 8.91   HGB 6.8* 8.5* 8.6*   HCT 20.9* 25.9* 26.4*    397 405   MCV 87 86 87   RDW 16.7* 15.9* 15.9*        Chemistries:  Recent Labs   Lab 01/26/25  0413 01/27/25  0308   * 135*   K 4.5 4.8    103   CO2 22* 24   BUN 15 14   CREATININE 0.8 0.8   CALCIUM 8.1* 9.1   ALBUMIN 1.7* 2.0*   PROT 6.2 7.3   BILITOT 0.6 0.7   ALKPHOS 75 89   ALT 17 23   AST 22 24   MG 2.2 2.2   PHOS 3.1 3.8       All pertinent labs within the past 24 hours have been reviewed.    Significant Imaging:  I have reviewed all pertinent imaging results/findings within the past 24 hours.

## 2025-01-27 NOTE — SUBJECTIVE & OBJECTIVE
Interval History: NAOE, Hgb has been stable since transfusion yesterday morning, does endorse some pain with urination today of what seems like bladder spasms, will try dose of ditropan and get UA      Objective:     Temp:  [97.8 °F (36.6 °C)-98.8 °F (37.1 °C)] 97.8 °F (36.6 °C)  Pulse:  [60-99] 67  Resp:  [12-39] 15  SpO2:  [90 %-98 %] 97 %  BP: (155-202)/() 160/78     Body mass index is 35.63 kg/m².           Drains       None                    Physical Exam  Constitutional:       General: He is not in acute distress.     Appearance: Normal appearance.   HENT:      Head: Normocephalic and atraumatic.   Eyes:      Extraocular Movements: Extraocular movements intact.      Pupils: Pupils are equal, round, and reactive to light.   Cardiovascular:      Rate and Rhythm: Normal rate.   Pulmonary:      Effort: Pulmonary effort is normal. No respiratory distress.   Abdominal:      General: Abdomen is flat. There is no distension.      Tenderness: There is abdominal tenderness. There is no right CVA tenderness or left CVA tenderness.   Genitourinary:     Comments: Normal appearing testicles bilaterally. Circumcised   Skin:     Coloration: Skin is not jaundiced.   Neurological:      General: No focal deficit present.      Mental Status: He is alert and oriented to person, place, and time.   Psychiatric:         Mood and Affect: Mood normal.         Behavior: Behavior normal.           Significant Labs:    BMP:  Recent Labs   Lab 01/25/25  0325 01/26/25  0413 01/27/25  0308   * 135* 135*   K 4.6 4.5 4.8    107 103   CO2 19* 22* 24   BUN 21 15 14   CREATININE 0.9 0.8 0.8   CALCIUM 8.2* 8.1* 9.1       CBC:   Recent Labs   Lab 01/26/25  0413 01/26/25  1544 01/27/25  0308   WBC 8.35 10.51 8.91   HGB 6.8* 8.5* 8.6*   HCT 20.9* 25.9* 26.4*    397 405       All pertinent labs results from the past 24 hours have been reviewed.    Significant Imaging:  All pertinent imaging results/findings from the past 24  hours have been reviewed.

## 2025-01-27 NOTE — PROGRESS NOTES
Urology Progress Note    Joe Morgan is a 64 y.o. male with a PMHx of pAF, HTN and T2DM presenting as a transfer from outside ED due to retroperitoneal hematomas with active contrast extravasation on CTA. Underwent angiogram and right gonadal artery embolization with IR. Urology consulted for RP bleed and concerns for testicular atrophy s/p embolization.     -- Embolization of gonadal unlikely to cause testicular atrophy due collateral blood supply  -- If patient decompensates, or requires mass transfusions may require repeat embolization.  Hgb currently stable and repeat CTA 1/24/25 showed no active bleed.  -- recommend tight BP control  -- ok for ditropan for bladder spasms  -- Will place outpatient referral to Urology to establish care in [place]. Will likely need abdominal imaging to assess for renal mass  - urology to sign off at this time       Please call with further questions or concerns.    Yadira Buenrostro MD, PGY-3   [facility]

## 2025-01-28 LAB
ALBUMIN SERPL BCP-MCNC: 2.1 G/DL (ref 3.5–5.2)
ALP SERPL-CCNC: 82 U/L (ref 40–150)
ALT SERPL W/O P-5'-P-CCNC: 20 U/L (ref 10–44)
ANION GAP SERPL CALC-SCNC: 10 MMOL/L (ref 8–16)
AST SERPL-CCNC: 20 U/L (ref 10–40)
BILIRUB SERPL-MCNC: 0.6 MG/DL (ref 0.1–1)
BUN SERPL-MCNC: 18 MG/DL (ref 8–23)
CALCIUM SERPL-MCNC: 9 MG/DL (ref 8.7–10.5)
CHLORIDE SERPL-SCNC: 103 MMOL/L (ref 95–110)
CO2 SERPL-SCNC: 20 MMOL/L (ref 23–29)
CREAT SERPL-MCNC: 0.8 MG/DL (ref 0.5–1.4)
ERYTHROCYTE [DISTWIDTH] IN BLOOD BY AUTOMATED COUNT: 15.9 % (ref 11.5–14.5)
EST. GFR  (NO RACE VARIABLE): >60 ML/MIN/1.73 M^2
GLUCOSE SERPL-MCNC: 164 MG/DL (ref 70–110)
HCT VFR BLD AUTO: 26.6 % (ref 40–54)
HGB BLD-MCNC: 8.7 G/DL (ref 14–18)
MAGNESIUM SERPL-MCNC: 2 MG/DL (ref 1.6–2.6)
MCH RBC QN AUTO: 28.8 PG (ref 27–31)
MCHC RBC AUTO-ENTMCNC: 32.7 G/DL (ref 32–36)
MCV RBC AUTO: 88 FL (ref 82–98)
PHOSPHATE SERPL-MCNC: 3.4 MG/DL (ref 2.7–4.5)
PLATELET # BLD AUTO: 371 K/UL (ref 150–450)
PMV BLD AUTO: 9.2 FL (ref 9.2–12.9)
POCT GLUCOSE: 168 MG/DL (ref 70–110)
POCT GLUCOSE: 182 MG/DL (ref 70–110)
POCT GLUCOSE: 189 MG/DL (ref 70–110)
POCT GLUCOSE: 233 MG/DL (ref 70–110)
POCT GLUCOSE: 256 MG/DL (ref 70–110)
POCT GLUCOSE: 262 MG/DL (ref 70–110)
POTASSIUM SERPL-SCNC: 4.4 MMOL/L (ref 3.5–5.1)
PROT SERPL-MCNC: 7.1 G/DL (ref 6–8.4)
RBC # BLD AUTO: 3.02 M/UL (ref 4.6–6.2)
SODIUM SERPL-SCNC: 133 MMOL/L (ref 136–145)
WBC # BLD AUTO: 7.62 K/UL (ref 3.9–12.7)

## 2025-01-28 PROCEDURE — 85027 COMPLETE CBC AUTOMATED: CPT

## 2025-01-28 PROCEDURE — 84100 ASSAY OF PHOSPHORUS: CPT

## 2025-01-28 PROCEDURE — 63600175 PHARM REV CODE 636 W HCPCS

## 2025-01-28 PROCEDURE — 80053 COMPREHEN METABOLIC PANEL: CPT

## 2025-01-28 PROCEDURE — 25000003 PHARM REV CODE 250

## 2025-01-28 PROCEDURE — 25000003 PHARM REV CODE 250: Performed by: INTERNAL MEDICINE

## 2025-01-28 PROCEDURE — 83735 ASSAY OF MAGNESIUM: CPT

## 2025-01-28 PROCEDURE — 11000001 HC ACUTE MED/SURG PRIVATE ROOM

## 2025-01-28 PROCEDURE — 99233 SBSQ HOSP IP/OBS HIGH 50: CPT | Mod: ,,, | Performed by: INTERNAL MEDICINE

## 2025-01-28 RX ORDER — OXYCODONE HYDROCHLORIDE 10 MG/1
10 TABLET ORAL EVERY 6 HOURS
Status: DISCONTINUED | OUTPATIENT
Start: 2025-01-28 | End: 2025-01-28

## 2025-01-28 RX ORDER — HYDRALAZINE HYDROCHLORIDE 25 MG/1
25 TABLET, FILM COATED ORAL EVERY 8 HOURS
Status: DISCONTINUED | OUTPATIENT
Start: 2025-01-28 | End: 2025-02-01 | Stop reason: HOSPADM

## 2025-01-28 RX ORDER — BUPRENORPHINE AND NALOXONE 8; 2 MG/1; MG/1
2 FILM, SOLUBLE BUCCAL; SUBLINGUAL
Status: DISCONTINUED | OUTPATIENT
Start: 2025-01-29 | End: 2025-01-29

## 2025-01-28 RX ORDER — INSULIN GLARGINE 100 [IU]/ML
24 INJECTION, SOLUTION SUBCUTANEOUS NIGHTLY
Status: DISCONTINUED | OUTPATIENT
Start: 2025-01-28 | End: 2025-01-29

## 2025-01-28 RX ORDER — BUPRENORPHINE AND NALOXONE 8; 2 MG/1; MG/1
2 FILM, SOLUBLE BUCCAL; SUBLINGUAL 3 TIMES DAILY
Status: COMPLETED | OUTPATIENT
Start: 2025-01-28 | End: 2025-01-28

## 2025-01-28 RX ORDER — ACETAMINOPHEN 500 MG
1000 TABLET ORAL ONCE
Status: COMPLETED | OUTPATIENT
Start: 2025-01-28 | End: 2025-01-28

## 2025-01-28 RX ORDER — ACETAMINOPHEN 500 MG
1000 TABLET ORAL EVERY 12 HOURS PRN
Status: DISCONTINUED | OUTPATIENT
Start: 2025-01-28 | End: 2025-02-01 | Stop reason: HOSPADM

## 2025-01-28 RX ORDER — BUPRENORPHINE AND NALOXONE 8; 2 MG/1; MG/1
2 FILM, SOLUBLE BUCCAL; SUBLINGUAL 3 TIMES DAILY
Status: DISCONTINUED | OUTPATIENT
Start: 2025-01-28 | End: 2025-01-28

## 2025-01-28 RX ORDER — BUPRENORPHINE AND NALOXONE 8; 2 MG/1; MG/1
1 FILM, SOLUBLE BUCCAL; SUBLINGUAL 3 TIMES DAILY
Status: DISCONTINUED | OUTPATIENT
Start: 2025-01-28 | End: 2025-01-28

## 2025-01-28 RX ORDER — METHOCARBAMOL 500 MG/1
500 TABLET, FILM COATED ORAL EVERY 6 HOURS
Status: DISCONTINUED | OUTPATIENT
Start: 2025-01-28 | End: 2025-02-01 | Stop reason: HOSPADM

## 2025-01-28 RX ORDER — HYDROMORPHONE HYDROCHLORIDE 1 MG/ML
0.5 INJECTION, SOLUTION INTRAMUSCULAR; INTRAVENOUS; SUBCUTANEOUS ONCE
Status: COMPLETED | OUTPATIENT
Start: 2025-01-28 | End: 2025-01-28

## 2025-01-28 RX ORDER — ACETAMINOPHEN 500 MG
1000 TABLET ORAL ONCE
Status: DISCONTINUED | OUTPATIENT
Start: 2025-01-28 | End: 2025-01-28

## 2025-01-28 RX ADMIN — METHOCARBAMOL 500 MG: 500 TABLET ORAL at 11:01

## 2025-01-28 RX ADMIN — ACETAMINOPHEN 1000 MG: 500 TABLET ORAL at 06:01

## 2025-01-28 RX ADMIN — HYDROMORPHONE HYDROCHLORIDE 0.5 MG: 1 INJECTION, SOLUTION INTRAMUSCULAR; INTRAVENOUS; SUBCUTANEOUS at 08:01

## 2025-01-28 RX ADMIN — OXYCODONE HYDROCHLORIDE 10 MG: 10 TABLET ORAL at 11:01

## 2025-01-28 RX ADMIN — INSULIN GLARGINE 24 UNITS: 100 INJECTION, SOLUTION SUBCUTANEOUS at 09:01

## 2025-01-28 RX ADMIN — BUPRENORPHINE AND NALOXONE 2 FILM: 8; 2 FILM BUCCAL; SUBLINGUAL at 09:01

## 2025-01-28 RX ADMIN — METHOCARBAMOL 500 MG: 500 TABLET ORAL at 05:01

## 2025-01-28 RX ADMIN — MUPIROCIN: 20 OINTMENT TOPICAL at 09:01

## 2025-01-28 RX ADMIN — BUPRENORPHINE AND NALOXONE 1 FILM: 8; 2 FILM BUCCAL; SUBLINGUAL at 02:01

## 2025-01-28 RX ADMIN — INSULIN ASPART 2 UNITS: 100 INJECTION, SOLUTION INTRAVENOUS; SUBCUTANEOUS at 11:01

## 2025-01-28 RX ADMIN — BUPRENORPHINE AND NALOXONE 3 FILM: 8; 2 FILM BUCCAL; SUBLINGUAL at 08:01

## 2025-01-28 RX ADMIN — HYDRALAZINE HYDROCHLORIDE 25 MG: 25 TABLET ORAL at 11:01

## 2025-01-28 RX ADMIN — OXYBUTYNIN CHLORIDE 5 MG: 5 TABLET, EXTENDED RELEASE ORAL at 08:01

## 2025-01-28 RX ADMIN — MUPIROCIN: 20 OINTMENT TOPICAL at 08:01

## 2025-01-28 RX ADMIN — HYDROMORPHONE HYDROCHLORIDE 0.5 MG: 1 INJECTION, SOLUTION INTRAMUSCULAR; INTRAVENOUS; SUBCUTANEOUS at 06:01

## 2025-01-28 RX ADMIN — AMLODIPINE BESYLATE 10 MG: 10 TABLET ORAL at 08:01

## 2025-01-28 RX ADMIN — HYDROMORPHONE HYDROCHLORIDE 0.5 MG: 1 INJECTION, SOLUTION INTRAMUSCULAR; INTRAVENOUS; SUBCUTANEOUS at 02:01

## 2025-01-28 RX ADMIN — HYDRALAZINE HYDROCHLORIDE 25 MG: 25 TABLET ORAL at 09:01

## 2025-01-28 RX ADMIN — LISINOPRIL 40 MG: 20 TABLET ORAL at 08:01

## 2025-01-28 RX ADMIN — INSULIN ASPART 3 UNITS: 100 INJECTION, SOLUTION INTRAVENOUS; SUBCUTANEOUS at 05:01

## 2025-01-28 RX ADMIN — INSULIN ASPART 1 UNITS: 100 INJECTION, SOLUTION INTRAVENOUS; SUBCUTANEOUS at 09:01

## 2025-01-28 RX ADMIN — METOPROLOL SUCCINATE 25 MG: 25 TABLET, EXTENDED RELEASE ORAL at 08:01

## 2025-01-28 RX ADMIN — OXYCODONE 5 MG: 5 TABLET ORAL at 05:01

## 2025-01-28 NOTE — ASSESSMENT & PLAN NOTE
Last A1c reviewed-   Lab Results   Component Value Date    LABA1C 10.7 (H) 06/27/2018    HGBA1C 10.1 (H) 01/24/2025     Inpatient Antihyperglycemic Regiment:  Antihyperglycemics (From admission, onward)    Start     Stop Route Frequency Ordered    01/27/25 2100  insulin glargine U-100 (Lantus) pen 19 Units         -- SubQ Nightly 01/27/25 1214    01/27/25 1210  insulin aspart U-100 pen 0-10 Units         -- SubQ Before meals & nightly PRN 01/27/25 1211        Blood Sugars (AccuCheck):    Recent Labs     01/27/25  1159 01/27/25  1724 01/27/25  2118 01/28/25  0314 01/28/25  0558 01/28/25  1123   POCTGLUCOSE 200* 268* 251* 168* 182* 233*

## 2025-01-28 NOTE — PLAN OF CARE
MICU DAILY GOALS     Family/Goals of care/Code Status   Code Status: Full Code    24H Vital Sign Range  Temp:  [97.8 °F (36.6 °C)-98.8 °F (37.1 °C)]   Pulse:  []   Resp:  [9-32]   BP: (129-189)/(62-89)   SpO2:  [90 %-100 %]      Shift Events (include procedures and significant events)   No acute events throughout shift. Still c/o persistent back pain. PVC seen on EKG - team aware. Abd firm with LUQ tender to touch -team notified, Xray ordered    AWAKE RASS: Goal -    Actual - RASS (Paz Agitation-Sedation Scale): alert and calm    Restraint necessity: Not necessary   BREATHE SBT: Not intubated    Coordinate A & B, analgesics/sedatives Pain: uncontrolled   SAT: Not intubated   Delirium CAM-ICU:     Early(intubated/ Progressive (non-intubated) Mobility MOVE Screen (INTUBATED ONLY): Not intubated    Activity: Activity Management: Sitting at edge of bed - L2   Feeding/Nutrition Diet order: Diet/Nutrition Received: consistent carb/diabetic diet,     Thrombus DVT prophylaxis: VTE Core Measure: Per order contraindicated for SCDs/Anticoagulants   HOB Elevation Head of Bed (HOB) Positioning: HOB at 30-45 degrees   Ulcer Prophylaxis GI: yes   Glucose control managed Glycemic Management: blood glucose monitored   Skin Skin assessment:     Sacrum intact/not altered? Yes  Heels intact/not altered? Yes  Surgical wound? No    CHECK ONE!   (no altered skin or altered skin) and sub boxes:  [] No Altered Skin Integrity Present    []Prevention Measures Documented    [x] Altered Skin Integrity Present or Discovered   [x] LDA present in EPIC, daily doc completed              [] LDA added if not in EPIC (describe wound).                    When describing wound, do not stage, use descriptive words only.    [] Wound Image Taken (required on admit,                   transfer/discharge and every Tuesday)    Wound Care Consulted? No   Bowel Function no issues    Indwelling Catheter Necessity         no   De-escalation Antibiotics  No        VS and assessment per flow sheet, patient progressing towards goals as tolerated, plan of care reviewed with  pt , all concerns addressed, will continue to monitor.

## 2025-01-28 NOTE — ASSESSMENT & PLAN NOTE
"65 y/o M with a medical hx of DM, hip fracture, GERD, HTN, HLD, and paroxysmal Afib who presented to [facility] as a transfer from [place] ED. Pt originally presented to [place] ED for R flank pain. He states he recently seen at [facility] for a kidney stone and thought pain was related to that.      CTA Abdomen showed: Large right perinephric and midline retroperitoneal hematomas with active contrast extravasation. Several areas of cortical hypoattenuation of the right kidney, suspicious for low-grade renal lacerations or infarcts.     IR consulted for emergent embolization. Concern for rupture of R gonadal artery, with concerns patient may lose R testicle. Admitted to MICU for concerns of hemorrhagic shock. HDS upon admission. Hgb stable at 10, baseline around 14. Now s/p IR embolization.  Urology consulted, Embolization of gonadal unlikely to cause testicular atrophy due collateral blood supply. Repeat CTA on 11/25 did not show active hemorrhage but redemonstrated large right renal subcapsular hematoma with mass effect suggesting page kidney.  IR consulted for possible drainage of hematoma.  Per IR: "Given dense appearance of hematoma on CT, a percutaneous drain is unlikely to decompress the hematoma. However, recommend right renal ultrasound to evaluate for any lower density regions of the hematoma that may be amendable to drainage."     Plan:  - Renal US : Large right subcapsular hematoma. No hydronephrosis. Elevated resistive index possibly secondary to compression of renal parenchyma.   - Will trend hemoglobin and monitor for hemodynamic instability.  - Transfuse PRN  - holding AC meds; plan to resume if hemoglobin is stable for more than 24 hours  - work up for aquired coagulapathy.   "

## 2025-01-28 NOTE — PROGRESS NOTES
"[facility] - [facility] ICU  Critical Care Medicine  Progress Note    Patient Name: Joe Morgan  MRN: [id number]  Admission Date: 1/23/2025  Hospital Length of Stay: 5 days  Code Status: Full Code  Attending Provider: Ron Mark MD  Primary Care Provider: Johnnie Gibbs MD   Principal Problem: Retroperitoneal hematoma    Subjective:     HPI:  65 y/o M with a medical hx of DM, hip fracture, GERD, HTN, HLD, and paroxysmal Afib who presented to [facility] as a transfer from [facility]. Pt originally presented to [facility] for R flank pain. He states he recently seen at [facility] for a kidney stone and thought pain was related to that.      Hospital Course from [facility]:  "Admitted and treated for UTI and hematoma of the right kidney after he presented with complaints of left flank pain and subjective fever. He had a CT of his abdomen and pelvis that was significant for right kidney pyleonephritis, trace right lung base pleural effusion. He was started on IVF and antibiotics. He continued to have pain and repeat CT was obtained and significant for GB distension, small pleural effusions, left inguinal hernia and interval development of subcapsular hematoma in the right kidney. He had follow up imaging the next day and had slight interval evolution of right subcapsular hemorrhage. He was on eliquis for afib which was stopped and his H/H was monitored. His imaging was also reviewed by Dr. Pina with urology for possible transfer but he recommended the patient continued to be monitored here. Did require 2 units of pRBC and blood cell counts have stabilized. The case was discussed with Dr. Pina who has agreed the patient is stable for discharge. Plan for follow up CT in 1 week and follow up with Dr. Pina after it is complete. Pt was deemed medically stable on day of discharge."    Patient states his pain immediately returned after being released. Pain located in the R lower back " radiating around the R flank and into the R testicle. It is constant, stabbing, severe in nature. Nothing makes it better or worse. He took no medications prior to arrival treat pain. Denies any symptoms on review of systems at this time.    CTA Abdomen showed: Large right perinephric and midline retroperitoneal hematomas with active contrast extravasation. Several areas of cortical hypoattenuation of the right kidney, suspicious for low-grade renal lacerations or infarcts.     IR consulted for emergent embolization. Concern for rupture of R gonadal artery, with concerns patient may lose R testicle. Admitted to MICU for concerns of hemorrhagic shock. Uro consulted      Hospital/ICU Course:  Admitted to MICU due to high risk of decompensation 2/2 active bleed.  On 1/24, hemoglobin continued to trend down s/p 2 units of pRBCs.  Repeat CTA A/P did not show active hemorrhage but redemonstrated large right renal subcapsular hematoma with mass effect suggesting page kidney.  Started on oral hydralazine which was switched to lisinopril.  IR reconsulted for possible drainage of hematoma and recommended retroperitoneal U/S.  Hemoglobin trend stable.    Interval History/Significant Events: Complaining of persistent right flank pain and painful bladder spasms when urination is ceased.      Review of Systems   All other systems reviewed and are negative.    Objective:     Vital Signs (Most Recent):  Temp: 98.3 °F (36.8 °C) (01/27/25 1105)  Pulse: 69 (01/27/25 1215)  Resp: 16 (01/27/25 1215)  BP: (!) 169/78 (01/27/25 1215)  SpO2: 99 % (01/27/25 1215) Vital Signs (24h Range):  Temp:  [97.8 °F (36.6 °C)-98.7 °F (37.1 °C)] 98.3 °F (36.8 °C)  Pulse:  [60-99] 69  Resp:  [12-26] 16  SpO2:  [92 %-100 %] 99 %  BP: (155-192)/(67-90) 169/78   Weight: 112.6 kg (248 lb 4.8 oz)  Body mass index is 35.63 kg/m².      Intake/Output Summary (Last 24 hours) at 1/27/2025 1415  Last data filed at 1/27/2025 0837  Gross per 24 hour   Intake 990 ml    Output 5470 ml   Net -4480 ml          Physical Exam  Vitals and nursing note reviewed.   Constitutional:       Appearance: He is well-developed.   HENT:      Head: Normocephalic.      Mouth/Throat:      Mouth: Mucous membranes are moist.   Eyes:      Pupils: Pupils are equal, round, and reactive to light.   Cardiovascular:      Rate and Rhythm: Normal rate and regular rhythm.      Pulses: Normal pulses.   Pulmonary:      Effort: Pulmonary effort is normal.   Abdominal:      General: Bowel sounds are normal. There is no distension.      Palpations: Abdomen is soft.      Tenderness: There is no abdominal tenderness. There is no guarding or rebound.      Hernia: No hernia is present.   Musculoskeletal:         General: No swelling.   Skin:     General: Skin is warm and dry.   Neurological:      Mental Status: He is alert.   Psychiatric:         Mood and Affect: Mood normal.         Behavior: Behavior normal.         Thought Content: Thought content normal.         Judgment: Judgment normal.        Vents:     Lines/Drains/Airways       Peripheral Intravenous Line  Duration                  Peripheral IV - Single Lumen 01/23/25 2110 20 G Anterior;Distal;Left Forearm 3 days         Peripheral IV - Single Lumen 01/26/25 1726 18 G 1 in Anterior;Right Upper Arm <1 day         Peripheral IV - Single Lumen 01/27/25 1209 20 G 1 3/4 in Anterior;Left;Proximal Forearm <1 day                  Significant Labs:    CBC/Anemia Profile:  Recent Labs   Lab 01/26/25  0413 01/26/25  1544 01/27/25  0308   WBC 8.35 10.51 8.91   HGB 6.8* 8.5* 8.6*   HCT 20.9* 25.9* 26.4*    397 405   MCV 87 86 87   RDW 16.7* 15.9* 15.9*        Chemistries:  Recent Labs   Lab 01/26/25  0413 01/27/25  0308   * 135*   K 4.5 4.8    103   CO2 22* 24   BUN 15 14   CREATININE 0.8 0.8   CALCIUM 8.1* 9.1   ALBUMIN 1.7* 2.0*   PROT 6.2 7.3   BILITOT 0.6 0.7   ALKPHOS 75 89   ALT 17 23   AST 22 24   MG 2.2 2.2   PHOS 3.1 3.8       All pertinent  "labs within the past 24 hours have been reviewed.    Significant Imaging:  I have reviewed all pertinent imaging results/findings within the past 24 hours.    ABG  No results for input(s): "PH", "PO2", "PCO2", "HCO3", "BE" in the last 168 hours.  Assessment/Plan:     Cardiac/Vascular  Paroxysmal atrial fibrillation  Hx of paroxysmal Afib. Currently in NSR.     Plan:  - Maintain K > 4, Mag > 2 and Ca/iCal WNL to decrease arrhythmogenic potential  - Rate control with metoprolol succinate 25 mg  - Emr chart review shows both metoprolol and Flecainide at home?? Pt unsure which if any he is taking  - Anticoagulation with Eliquis at home, reports hasn't taken any within past 2 days. Will hold in setting of retroperitoneal bleed  - Maintain on telemetry     Essential hypertension  Takes lisinopril 40 mg and metoprolol succinate 25 mg as home medications. HDS upon admission and s/p embolization.     - restart lisinopril in the setting of page kidney  - add amlodipine for tighter bp control in the setting of RP bleed.     Endocrine  Type 2 diabetes mellitus  Last A1c reviewed-   Lab Results   Component Value Date    LABA1C 10.7 (H) 06/27/2018     Inpatient Antihyperglycemic Regiment:  Antihyperglycemics (From admission, onward)      Start     Stop Route Frequency Ordered    01/24/25 0900  insulin glargine U-100 (Lantus) pen 15 Units         -- SubQ Daily 01/24/25 0056    01/24/25 0715  insulin aspart U-100 pen 6 Units         -- SubQ 3 times daily with meals 01/24/25 0056    01/24/25 0155  insulin aspart U-100 pen 0-5 Units         -- SubQ Before meals & nightly PRN 01/24/25 0056          Blood Sugars (AccuCheck):    Recent Labs     01/23/25  1620   POCTGLUCOSE 297*       Obesity  Body mass index is 35.63 kg/m². Morbid obesity complicates all aspects of disease management from diagnostic modalities to treatment. Weight loss encouraged and health benefits explained to patient.     GI  * Retroperitoneal hematoma  63 y/o M with " "a medical hx of DM, hip fracture, GERD, HTN, HLD, and paroxysmal Afib who [facility] [place] [facility] from [place] [facility]. Pt originally presented to [place] ED for R flank pain. He states he recently seen at [facility] for a kidney stone and thought pain was related to that.      CTA Abdomen showed: Large right perinephric and midline retroperitoneal hematomas with active contrast extravasation. Several areas of cortical hypoattenuation of the right kidney, suspicious for low-grade renal lacerations or infarcts.     IR consulted for emergent embolization. Concern for rupture of R gonadal artery, with concerns patient may lose R testicle. Admitted to MICU for concerns of hemorrhagic shock. HDS upon admission. Hgb stable at 10, baseline around 14. Now s/p IR embolization.  Urology consulted, Embolization of gonadal unlikely to cause testicular atrophy due collateral blood supply. Repeat CTA on 11/25 did not show active hemorrhage but redemonstrated large right renal subcapsular hematoma with mass effect suggesting page kidney.  IR consulted for possible drainage of hematoma.  Per IR: "Given dense appearance of hematoma on CT, a percutaneous drain is unlikely to decompress the hematoma. However, recommend right renal ultrasound to evaluate for any lower density regions of the hematoma that may be amendable to drainage."     Plan:  - follow up retroperitoneal U/S to evaluate for possible regions amenable for drainage  - Will trend hemoglobin and monitor for hemodynamic instability.  - Transfuse PRN  - holding AC meds; plan to resume if hemoglobin is stable for more than 24 hours  - work up for aquired coagulapathy.     Abdominal distention  Complaints of abdominal distention and mild tenderness since 1/24.  Reports having normal bowel movements every 3-4 days.  Patient reports being able to pass gas.  CTA AP from 01/25 showed decompressed stomach with perigastric soft tissue stranding posteriorly.  No " large bowel obstruction noted.  Moderate volume stool throughout the colon.    -follow up KUB  - start MiraLax b.i.d. and senna/docusate       Critical Care Daily Checklist:    A: Awake: RASS Goal/Actual Goal:    Actual:     B: Spontaneous Breathing Trial Performed?     C: SAT & SBT Coordinated?  N/a                      D: Delirium: CAM-ICU     E: Early Mobility Performed? Yes   F: Feeding Goal:    Status:     Current Diet Order   Procedures    Diet Adult Regular Consistent Carbohydrate     Order Specific Question:   Additional Diet Options:     Answer:   Consistent Carbohydrate      AS: Analgesia/Sedation Oxy 10mg q6 ; dilaudid 0.5 prnq6   T: Thromboembolic Prophylaxis contraindicated   H: HOB > 300 Yes   U: Stress Ulcer Prophylaxis (if needed) none   G: Glucose Control 182   B: Bowel Function Stool Occurrence: 1   I: Indwelling Catheter (Lines & Hood) Necessity none   D: De-escalation of Antimicrobials/Pharmacotherapies planned    Plan for the day/ETD stepdown    Code Status:  Family/Goals of Care: Full Code         Critical secondary to Patient has a condition that poses threat to life and bodily function: [facility]      Critical care was time spent personally by me on the following activities: development of treatment plan with patient or surrogate and bedside caregivers, discussions with consultants, evaluation of patient's response to treatment, examination of patient, ordering and performing treatments and interventions, ordering and review of laboratory studies, ordering and review of radiographic studies, pulse oximetry, re-evaluation of patient's condition. This critical care time did not overlap with that of any other provider or involve time for any procedures.     Javier Ortiz MD  Critical Care Medicine  [facility] - Medical ICU

## 2025-01-28 NOTE — PROGRESS NOTES
"[facility] - [facility] ICU  Critical Care Medicine  Progress Note    Patient Name: Joe Morgan  MRN: [id number]  Admission Date: 1/23/2025  Hospital Length of Stay: 5 days  Code Status: Full Code  Attending Provider: Ron Mark MD  Primary Care Provider: Johnnie Gibbs MD   Principal Problem: Retroperitoneal hematoma    Subjective:     HPI:  63 y/o M with a medical hx of DM, hip fracture, GERD, HTN, HLD, and paroxysmal Afib who presented to [facility] as a transfer from [facility]. Pt originally presented to [facility] for R flank pain. He states he recently seen at [facility] for a kidney stone and thought pain was related to that.      Hospital Course from [facility]:  "Admitted and treated for UTI and hematoma of the right kidney after he presented with complaints of left flank pain and subjective fever. He had a CT of his abdomen and pelvis that was significant for right kidney pyleonephritis, trace right lung base pleural effusion. He was started on IVF and antibiotics. He continued to have pain and repeat CT was obtained and significant for GB distension, small pleural effusions, left inguinal hernia and interval development of subcapsular hematoma in the right kidney. He had follow up imaging the next day and had slight interval evolution of right subcapsular hemorrhage. He was on eliquis for afib which was stopped and his H/H was monitored. His imaging was also reviewed by Dr. Pina with urology for possible transfer but he recommended the patient continued to be monitored here. Did require 2 units of pRBC and blood cell counts have stabilized. The case was discussed with Dr. Pina who has agreed the patient is stable for discharge. Plan for follow up CT in 1 week and follow up with Dr. Pina after it is complete. Pt was deemed medically stable on day of discharge."    Patient states his pain immediately returned after being released. Pain located in the R lower back " "radiating around the R flank and into the R testicle. It is constant, stabbing, severe in nature. Nothing makes it better or worse. He took no medications prior to arrival treat pain. Denies any symptoms on review of systems at this time.    CTA Abdomen showed: Large right perinephric and midline retroperitoneal hematomas with active contrast extravasation. Several areas of cortical hypoattenuation of the right kidney, suspicious for low-grade renal lacerations or infarcts.     IR consulted for emergent embolization. Concern for rupture of R gonadal artery, with concerns patient may lose R testicle. Admitted to MICU for concerns of hemorrhagic shock. Uro consulted      Hospital/ICU Course:  Admitted to MICU due to high risk of decompensation 2/2 active bleed.  On 1/24, hemoglobin continued to trend down s/p 2 units of pRBCs.  Repeat CTA A/P did not show active hemorrhage but redemonstrated large right renal subcapsular hematoma with mass effect suggesting page kidney.  Started on oral hydralazine which was switched to lisinopril.  IR reconsulted for possible drainage of hematoma and recommended retroperitoneal U/S.  Patient's pain is currently uncontrolled. Patient's suboxone was spaced out to 1 film TID for longer pain control throughout the day. Uptitrate films as medically necessary. Hemoglobin trend stable. Ready for stepdown to hospital medicine.     No new subjective & objective note has been filed under this hospital service since the last note was generated.      ABG  No results for input(s): "PH", "PO2", "PCO2", "HCO3", "BE" in the last 168 hours.  Assessment/Plan:     Cardiac/Vascular  Paroxysmal atrial fibrillation  Hx of paroxysmal Afib. Currently in NSR.     Plan:  - Maintain K > 4, Mag > 2 and Ca/iCal WNL to decrease arrhythmogenic potential  - Rate control with metoprolol succinate 25 mg  - Emr chart review shows both metoprolol and Flecainide at home?? Pt unsure which if any he is taking  - " Anticoagulation with Eliquis at home, reports hasn't taken any within past 2 days. Will hold in setting of retroperitoneal bleed  - Maintain on telemetry     Essential hypertension  Takes lisinopril 40 mg and metoprolol succinate 25 mg as home medications. HDS upon admission and s/p embolization.     - restart lisinopril  - add amlodipine for tighter bp control in the setting of RP bleed.   - add hydral 25mg q8    Vitals:    01/28/25 0500 01/28/25 0625 01/28/25 0700 01/28/25 0800   BP: (!) 175/81 (!) 159/80 (!) 177/82 (!) 168/72    01/28/25 0810 01/28/25 0905 01/28/25 1000 01/28/25 1100   BP: (!) 168/72 (!) 177/85 130/62 (!) 172/75    01/28/25 1105 01/28/25 1300   BP: (!) 172/75 (!) 154/72        Endocrine  Type 2 diabetes mellitus  Last A1c reviewed-   Lab Results   Component Value Date    LABA1C 10.7 (H) 06/27/2018    HGBA1C 10.1 (H) 01/24/2025     Inpatient Antihyperglycemic Regiment:  Antihyperglycemics (From admission, onward)      Start     Stop Route Frequency Ordered    01/27/25 2100  insulin glargine U-100 (Lantus) pen 19 Units         -- SubQ Nightly 01/27/25 1214    01/27/25 1210  insulin aspart U-100 pen 0-10 Units         -- SubQ Before meals & nightly PRN 01/27/25 1211          Blood Sugars (AccuCheck):    Recent Labs     01/27/25  1159 01/27/25  1724 01/27/25  2118 01/28/25  0314 01/28/25  0558 01/28/25  1123   POCTGLUCOSE 200* 268* 251* 168* 182* 233*         Obesity  Body mass index is 35.63 kg/m². Morbid obesity complicates all aspects of disease management from diagnostic modalities to treatment. Weight loss encouraged and health benefits explained to patient.     GI  * Retroperitoneal hematoma  65 y/o M with a medical hx of DM, hip fracture, GERD, HTN, HLD, and paroxysmal Afib who presented to [facility] as a transfer from [place] [facility]. Pt originally presented to [place] ED for R flank pain. He states he recently seen at [facility] for a kidney stone and thought pain was related to  "that.      CTA Abdomen showed: Large right perinephric and midline retroperitoneal hematomas with active contrast extravasation. Several areas of cortical hypoattenuation of the right kidney, suspicious for low-grade renal lacerations or infarcts.     IR consulted for emergent embolization. Concern for rupture of R gonadal artery, with concerns patient may lose R testicle. Admitted to MICU for concerns of hemorrhagic shock. HDS upon admission. Hgb stable at 10, baseline around 14. Now s/p IR embolization.  Urology consulted, Embolization of gonadal unlikely to cause testicular atrophy due collateral blood supply. Repeat CTA on 11/25 did not show active hemorrhage but redemonstrated large right renal subcapsular hematoma with mass effect suggesting page kidney.  IR consulted for possible drainage of hematoma.  Per IR: "Given dense appearance of hematoma on CT, a percutaneous drain is unlikely to decompress the hematoma. However, recommend right renal ultrasound to evaluate for any lower density regions of the hematoma that may be amendable to drainage."     Plan:  - Renal US : Large right subcapsular hematoma. No hydronephrosis. Elevated resistive index possibly secondary to compression of renal parenchyma.   - Will trend hemoglobin and monitor for hemodynamic instability.  - Transfuse PRN  - holding AC meds; plan to resume if hemoglobin is stable for more than 24 hours  - work up for aquired coagulapathy.     Abdominal distention  Complaints of abdominal distention and mild tenderness since 1/24.  Reports having normal bowel movements every 3-4 days.  Patient reports being able to pass gas.  CTA AP from 01/25 showed decompressed stomach with perigastric soft tissue stranding posteriorly.  No large bowel obstruction noted.  Moderate volume stool throughout the colon.    - start MiraLax b.i.d. and senna/docusate       Critical Care Daily Checklist:    A: Awake: RASS Goal/Actual Goal:    Actual:     B: Spontaneous " Breathing Trial Performed?     C: SAT & SBT Coordinated?  no                      D: Delirium: CAM-ICU     E: Early Mobility Performed? Yes   F: Feeding Goal:    Status:     Current Diet Order   Procedures    Diet Adult Regular Consistent Carbohydrate     Order Specific Question:   Additional Diet Options:     Answer:   Consistent Carbohydrate      AS: Analgesia/Sedation Suboxone tid   T: Thromboembolic Prophylaxis contraindicated   H: HOB > 300 Yes   U: Stress Ulcer Prophylaxis (if needed) none   G: Glucose Control SS + glargine 24   B: Bowel Function Stool Occurrence: 1   I: Indwelling Catheter (Lines & Hood) Necessity PIV   D: De-escalation of Antimicrobials/Pharmacotherapies Not applicable    Plan for the day/ETD stepdown    Code Status:  Family/Goals of Care: Full Code       Critical secondary to Patient has a condition that poses threat to life and bodily function: [facility]      Critical care was time spent personally by me on the following activities: development of treatment plan with patient or surrogate and bedside caregivers, discussions with consultants, evaluation of patient's response to treatment, examination of patient, ordering and performing treatments and interventions, ordering and review of laboratory studies, ordering and review of radiographic studies, pulse oximetry, re-evaluation of patient's condition. This critical care time did not overlap with that of any other provider or involve time for any procedures.     Javier Ortiz MD  Critical Care Medicine  [facility] - [facility] ICU

## 2025-01-28 NOTE — PLAN OF CARE
MICU DAILY GOALS     Family/Goals of care/Code Status   Code Status: Full Code    24H Vital Sign Range  Temp:  [97.8 °F (36.6 °C)-98.8 °F (37.1 °C)]   Pulse:  []   Resp:  [9-32]   BP: (139-189)/(63-89)   SpO2:  [90 %-100 %]      Shift Events (include procedures and significant events)   No acute events throughout shift    AWAKE RASS: Goal -    Actual - RASS (Paz Agitation-Sedation Scale): alert and calm    Restraint necessity: Not necessary   BREATHE SBT: Not intubated    Coordinate A & B, analgesics/sedatives Pain: managed   SAT: Not intubated   Delirium CAM-ICU:     Early(intubated/ Progressive (non-intubated) Mobility MOVE Screen (INTUBATED ONLY): Not intubated    Activity: Activity Management: Rolling - L1   Feeding/Nutrition Diet order: Diet/Nutrition Received: consistent carb/diabetic diet,     Thrombus DVT prophylaxis: VTE Core Measure: Per order contraindicated for SCDs/Anticoagulants   HOB Elevation Head of Bed (HOB) Positioning: HOB at 30-45 degrees   Ulcer Prophylaxis GI: yes   Glucose control managed Glycemic Management: blood glucose monitored   Skin Skin assessment:     Sacrum intact/not altered? Yes  Heels intact/not altered? Yes  Surgical wound? No    CHECK ONE!   (no altered skin or altered skin) and sub boxes:  [] No Altered Skin Integrity Present    []Prevention Measures Documented    [] Altered Skin Integrity Present or Discovered   [] LDA present in EPIC, daily doc completed              [] LDA added if not in EPIC (describe wound).                    When describing wound, do not stage, use descriptive words only.    [] Wound Image Taken (required on admit,                   transfer/discharge and every Tuesday)    Wound Care Consulted? No   Bowel Function no issues    Indwelling Catheter Necessity            De-escalation Antibiotics No        VS and assessment per flow sheet, patient progressing towards goals as tolerated, plan of care reviewed with  pt , all concerns addressed.

## 2025-01-28 NOTE — ASSESSMENT & PLAN NOTE
Complaints of abdominal distention and mild tenderness since 1/24.  Reports having normal bowel movements every 3-4 days.  Patient reports being able to pass gas.  CTA AP from 01/25 showed decompressed stomach with perigastric soft tissue stranding posteriorly.  No large bowel obstruction noted.  Moderate volume stool throughout the colon.    - start MiraLax b.i.d. and senna/docusate

## 2025-01-28 NOTE — PHYSICIAN QUERY
Please clarify if there is any clinical correlation between retroperitoneal hematoma and eliquis. Are the conditions:   Clinically undetermined

## 2025-01-28 NOTE — ASSESSMENT & PLAN NOTE
Takes lisinopril 40 mg and metoprolol succinate 25 mg as home medications. HDS upon admission and s/p embolization.     - restart lisinopril  - add amlodipine for tighter bp control in the setting of RP bleed.   - add hydral 25mg q8    Vitals:    01/28/25 0500 01/28/25 0625 01/28/25 0700 01/28/25 0800   BP: (!) 175/81 (!) 159/80 (!) 177/82 (!) 168/72    01/28/25 0810 01/28/25 0905 01/28/25 1000 01/28/25 1100   BP: (!) 168/72 (!) 177/85 130/62 (!) 172/75    01/28/25 1105 01/28/25 1300   BP: (!) 172/75 (!) 154/72

## 2025-01-29 LAB
ABO + RH BLD: NORMAL
ALBUMIN SERPL BCP-MCNC: 2.2 G/DL (ref 3.5–5.2)
ALP SERPL-CCNC: 89 U/L (ref 40–150)
ALT SERPL W/O P-5'-P-CCNC: 22 U/L (ref 10–44)
ANION GAP SERPL CALC-SCNC: 8 MMOL/L (ref 8–16)
AST SERPL-CCNC: 25 U/L (ref 10–40)
BACTERIA BLD CULT: NORMAL
BACTERIA BLD CULT: NORMAL
BILIRUB SERPL-MCNC: 0.6 MG/DL (ref 0.1–1)
BLD GP AB SCN CELLS X3 SERPL QL: NORMAL
BUN SERPL-MCNC: 23 MG/DL (ref 8–23)
CALCIUM SERPL-MCNC: 8.9 MG/DL (ref 8.7–10.5)
CHLORIDE SERPL-SCNC: 100 MMOL/L (ref 95–110)
CO2 SERPL-SCNC: 23 MMOL/L (ref 23–29)
CREAT SERPL-MCNC: 0.9 MG/DL (ref 0.5–1.4)
ERYTHROCYTE [DISTWIDTH] IN BLOOD BY AUTOMATED COUNT: 15.9 % (ref 11.5–14.5)
EST. GFR  (NO RACE VARIABLE): >60 ML/MIN/1.73 M^2
GLUCOSE SERPL-MCNC: 122 MG/DL (ref 70–110)
HCT VFR BLD AUTO: 28.8 % (ref 40–54)
HGB BLD-MCNC: 9.3 G/DL (ref 14–18)
MAGNESIUM SERPL-MCNC: 2 MG/DL (ref 1.6–2.6)
MCH RBC QN AUTO: 28.4 PG (ref 27–31)
MCHC RBC AUTO-ENTMCNC: 32.3 G/DL (ref 32–36)
MCV RBC AUTO: 88 FL (ref 82–98)
PHOSPHATE SERPL-MCNC: 3.7 MG/DL (ref 2.7–4.5)
PLATELET # BLD AUTO: 475 K/UL (ref 150–450)
PMV BLD AUTO: 8.9 FL (ref 9.2–12.9)
POCT GLUCOSE: 165 MG/DL (ref 70–110)
POCT GLUCOSE: 277 MG/DL (ref 70–110)
POCT GLUCOSE: 282 MG/DL (ref 70–110)
POCT GLUCOSE: 283 MG/DL (ref 70–110)
POTASSIUM SERPL-SCNC: 4.4 MMOL/L (ref 3.5–5.1)
PROT SERPL-MCNC: 7.7 G/DL (ref 6–8.4)
RBC # BLD AUTO: 3.28 M/UL (ref 4.6–6.2)
SODIUM SERPL-SCNC: 131 MMOL/L (ref 136–145)
SPECIMEN OUTDATE: NORMAL
VWF AG ACT/NOR PPP IA: 282 % (ref 55–200)
WBC # BLD AUTO: 9.62 K/UL (ref 3.9–12.7)

## 2025-01-29 PROCEDURE — 84100 ASSAY OF PHOSPHORUS: CPT

## 2025-01-29 PROCEDURE — 63600175 PHARM REV CODE 636 W HCPCS: Performed by: INTERNAL MEDICINE

## 2025-01-29 PROCEDURE — 86920 COMPATIBILITY TEST SPIN: CPT

## 2025-01-29 PROCEDURE — 25000003 PHARM REV CODE 250

## 2025-01-29 PROCEDURE — 63600175 PHARM REV CODE 636 W HCPCS

## 2025-01-29 PROCEDURE — 94761 N-INVAS EAR/PLS OXIMETRY MLT: CPT

## 2025-01-29 PROCEDURE — 80053 COMPREHEN METABOLIC PANEL: CPT

## 2025-01-29 PROCEDURE — 25000003 PHARM REV CODE 250: Performed by: INTERNAL MEDICINE

## 2025-01-29 PROCEDURE — 63600175 PHARM REV CODE 636 W HCPCS: Mod: TB,JZ

## 2025-01-29 PROCEDURE — 83735 ASSAY OF MAGNESIUM: CPT

## 2025-01-29 PROCEDURE — 86900 BLOOD TYPING SEROLOGIC ABO: CPT

## 2025-01-29 PROCEDURE — 11000001 HC ACUTE MED/SURG PRIVATE ROOM

## 2025-01-29 PROCEDURE — 85027 COMPLETE CBC AUTOMATED: CPT

## 2025-01-29 PROCEDURE — 99233 SBSQ HOSP IP/OBS HIGH 50: CPT | Mod: ,,, | Performed by: INTERNAL MEDICINE

## 2025-01-29 RX ORDER — IBUPROFEN 200 MG
16 TABLET ORAL
Status: DISCONTINUED | OUTPATIENT
Start: 2025-01-29 | End: 2025-02-01 | Stop reason: HOSPADM

## 2025-01-29 RX ORDER — GLUCAGON 1 MG
1 KIT INJECTION
Status: DISCONTINUED | OUTPATIENT
Start: 2025-01-29 | End: 2025-02-01 | Stop reason: HOSPADM

## 2025-01-29 RX ORDER — OXYBUTYNIN CHLORIDE 5 MG/1
5 TABLET, EXTENDED RELEASE ORAL DAILY
Qty: 10 TABLET | Refills: 0 | OUTPATIENT
Start: 2025-01-30 | End: 2025-02-09

## 2025-01-29 RX ORDER — AMLODIPINE BESYLATE 10 MG/1
10 TABLET ORAL DAILY
Qty: 90 TABLET | Refills: 3 | OUTPATIENT
Start: 2025-01-30 | End: 2026-01-30

## 2025-01-29 RX ORDER — INSULIN ASPART 100 [IU]/ML
0-10 INJECTION, SOLUTION INTRAVENOUS; SUBCUTANEOUS
Status: DISCONTINUED | OUTPATIENT
Start: 2025-01-29 | End: 2025-02-01 | Stop reason: HOSPADM

## 2025-01-29 RX ORDER — GLUCAGON 1 MG
1 KIT INJECTION
Status: CANCELLED | OUTPATIENT
Start: 2025-01-29

## 2025-01-29 RX ORDER — METHOCARBAMOL 500 MG/1
500 TABLET, FILM COATED ORAL EVERY 6 HOURS
Qty: 40 TABLET | Refills: 0 | OUTPATIENT
Start: 2025-01-29 | End: 2025-02-08

## 2025-01-29 RX ORDER — BUPRENORPHINE AND NALOXONE 8; 2 MG/1; MG/1
1 FILM, SOLUBLE BUCCAL; SUBLINGUAL EVERY 6 HOURS
Status: DISCONTINUED | OUTPATIENT
Start: 2025-01-29 | End: 2025-01-30

## 2025-01-29 RX ORDER — IBUPROFEN 200 MG
24 TABLET ORAL
Status: CANCELLED | OUTPATIENT
Start: 2025-01-29

## 2025-01-29 RX ORDER — HYDRALAZINE HYDROCHLORIDE 25 MG/1
25 TABLET, FILM COATED ORAL EVERY 8 HOURS
Qty: 90 TABLET | Refills: 11 | OUTPATIENT
Start: 2025-01-29 | End: 2026-01-29

## 2025-01-29 RX ORDER — HYDROCODONE BITARTRATE AND ACETAMINOPHEN 500; 5 MG/1; MG/1
TABLET ORAL
Status: DISCONTINUED | OUTPATIENT
Start: 2025-01-29 | End: 2025-02-01 | Stop reason: HOSPADM

## 2025-01-29 RX ORDER — IBUPROFEN 200 MG
16 TABLET ORAL
Status: CANCELLED | OUTPATIENT
Start: 2025-01-29

## 2025-01-29 RX ORDER — BUPRENORPHINE AND NALOXONE 8; 2 MG/1; MG/1
1 FILM, SOLUBLE BUCCAL; SUBLINGUAL EVERY 6 HOURS
Qty: 120 FILM | Refills: 0 | OUTPATIENT
Start: 2025-01-29 | End: 2025-02-28

## 2025-01-29 RX ORDER — IBUPROFEN 200 MG
24 TABLET ORAL
Status: DISCONTINUED | OUTPATIENT
Start: 2025-01-29 | End: 2025-02-01 | Stop reason: HOSPADM

## 2025-01-29 RX ORDER — BUPRENORPHINE AND NALOXONE 8; 2 MG/1; MG/1
2 FILM, SOLUBLE BUCCAL; SUBLINGUAL ONCE
Status: COMPLETED | OUTPATIENT
Start: 2025-01-29 | End: 2025-01-29

## 2025-01-29 RX ORDER — HYDROMORPHONE HYDROCHLORIDE 1 MG/ML
0.5 INJECTION, SOLUTION INTRAMUSCULAR; INTRAVENOUS; SUBCUTANEOUS ONCE
Status: COMPLETED | OUTPATIENT
Start: 2025-01-29 | End: 2025-01-29

## 2025-01-29 RX ORDER — BUPRENORPHINE AND NALOXONE 8; 2 MG/1; MG/1
2 FILM, SOLUBLE BUCCAL; SUBLINGUAL DAILY
Status: DISCONTINUED | OUTPATIENT
Start: 2025-01-29 | End: 2025-01-29

## 2025-01-29 RX ORDER — INSULIN GLARGINE 100 [IU]/ML
27 INJECTION, SOLUTION SUBCUTANEOUS NIGHTLY
Status: DISCONTINUED | OUTPATIENT
Start: 2025-01-29 | End: 2025-01-30

## 2025-01-29 RX ADMIN — METHOCARBAMOL 500 MG: 500 TABLET ORAL at 05:01

## 2025-01-29 RX ADMIN — HYDROMORPHONE HYDROCHLORIDE 0.5 MG: 1 INJECTION, SOLUTION INTRAMUSCULAR; INTRAVENOUS; SUBCUTANEOUS at 09:01

## 2025-01-29 RX ADMIN — AMLODIPINE BESYLATE 10 MG: 10 TABLET ORAL at 09:01

## 2025-01-29 RX ADMIN — HYDRALAZINE HYDROCHLORIDE 25 MG: 25 TABLET ORAL at 05:01

## 2025-01-29 RX ADMIN — ACETAMINOPHEN 1000 MG: 500 TABLET ORAL at 03:01

## 2025-01-29 RX ADMIN — BUPRENORPHINE AND NALOXONE 1 FILM: 8; 2 FILM BUCCAL; SUBLINGUAL at 11:01

## 2025-01-29 RX ADMIN — LISINOPRIL 40 MG: 20 TABLET ORAL at 09:01

## 2025-01-29 RX ADMIN — INSULIN GLARGINE 27 UNITS: 100 INJECTION, SOLUTION SUBCUTANEOUS at 09:01

## 2025-01-29 RX ADMIN — HYDRALAZINE HYDROCHLORIDE 25 MG: 25 TABLET ORAL at 09:01

## 2025-01-29 RX ADMIN — BUPRENORPHINE AND NALOXONE 1 FILM: 8; 2 FILM BUCCAL; SUBLINGUAL at 05:01

## 2025-01-29 RX ADMIN — HYDRALAZINE HYDROCHLORIDE 25 MG: 25 TABLET ORAL at 02:01

## 2025-01-29 RX ADMIN — OXYBUTYNIN CHLORIDE 5 MG: 5 TABLET, EXTENDED RELEASE ORAL at 09:01

## 2025-01-29 RX ADMIN — METHOCARBAMOL 500 MG: 500 TABLET ORAL at 11:01

## 2025-01-29 RX ADMIN — INSULIN ASPART 3 UNITS: 100 INJECTION, SOLUTION INTRAVENOUS; SUBCUTANEOUS at 11:01

## 2025-01-29 RX ADMIN — MUPIROCIN: 20 OINTMENT TOPICAL at 09:01

## 2025-01-29 RX ADMIN — INSULIN ASPART 3 UNITS: 100 INJECTION, SOLUTION INTRAVENOUS; SUBCUTANEOUS at 05:01

## 2025-01-29 RX ADMIN — CLOTRIMAZOLE AND BETAMETHASONE DIPROPIONATE: 10; .64 CREAM TOPICAL at 09:01

## 2025-01-29 RX ADMIN — INSULIN ASPART 3 UNITS: 100 INJECTION, SOLUTION INTRAVENOUS; SUBCUTANEOUS at 09:01

## 2025-01-29 RX ADMIN — METOPROLOL SUCCINATE 25 MG: 25 TABLET, EXTENDED RELEASE ORAL at 09:01

## 2025-01-29 RX ADMIN — BUPRENORPHINE AND NALOXONE 2 FILM: 8; 2 FILM BUCCAL; SUBLINGUAL at 03:01

## 2025-01-29 NOTE — PLAN OF CARE
MICU DAILY GOALS     Family/Goals of care/Code Status   Code Status: Full Code    24H Vital Sign Range  Temp:  [97.9 °F (36.6 °C)-98.4 °F (36.9 °C)]   Pulse:  [61-95]   Resp:  [10-33]   BP: (136-207)/(66-91)   SpO2:  [93 %-99 %]      Shift Events (include procedures and significant events)   No acute events throughout shift    AWAKE RASS: Goal -    Actual - RASS (Paz Agitation-Sedation Scale): alert and calm    Restraint necessity: Not necessary   BREATHE SBT: Not intubated    Coordinate A & B, analgesics/sedatives Pain: managed   SAT: Not intubated   Delirium CAM-ICU:     Early(intubated/ Progressive (non-intubated) Mobility MOVE Screen (INTUBATED ONLY): Not intubated    Activity: Activity Management: Ambulated to bathroom - L4   Feeding/Nutrition Diet order: Diet/Nutrition Received: consistent carb/diabetic diet,     Thrombus DVT prophylaxis: VTE Core Measure: (SCDs) Sequential compression device initiated/maintained   HOB Elevation Head of Bed (HOB) Positioning: HOB elevated   Ulcer Prophylaxis GI: yes   Glucose control managed Glycemic Management: blood glucose monitored   Skin Skin assessment:     Sacrum intact/not altered? Yes  Heels intact/not altered? Yes  Surgical wound? No    CHECK ONE!   (no altered skin or altered skin) and sub boxes:  [] No Altered Skin Integrity Present    []Prevention Measures Documented    [] Altered Skin Integrity Present or Discovered   [] LDA present in EPIC, daily doc completed              [] LDA added if not in EPIC (describe wound).                    When describing wound, do not stage, use descriptive words only.    [] Wound Image Taken (required on admit,                   transfer/discharge and every Tuesday)    Wound Care Consulted? No   Bowel Function no issues    Indwelling Catheter Necessity            De-escalation Antibiotics No        VS and assessment per flow sheet, patient progressing towards goals as tolerated, plan of care reviewed with family, all  concerns addressed, will continue to monitor.    Problem: Adult Inpatient Plan of Care  Goal: Plan of Care Review  Outcome: Progressing  Goal: Patient-Specific Goal (Individualized)  Outcome: Progressing  Goal: Absence of Hospital-Acquired Illness or Injury  Outcome: Progressing  Goal: Optimal Comfort and Wellbeing  Outcome: Progressing  Goal: Readiness for Transition of Care  Outcome: Progressing     Problem: Diabetes Comorbidity  Goal: Blood Glucose Level Within Targeted Range  Outcome: Progressing     Problem: Wound  Goal: Optimal Coping  Outcome: Progressing  Goal: Optimal Functional Ability  Outcome: Progressing  Goal: Absence of Infection Signs and Symptoms  Outcome: Progressing  Goal: Improved Oral Intake  Outcome: Progressing  Goal: Optimal Pain Control and Function  Outcome: Progressing  Goal: Skin Health and Integrity  Outcome: Progressing  Goal: Optimal Wound Healing  Outcome: Progressing     Problem: Acute Kidney Injury/Impairment  Goal: Fluid and Electrolyte Balance  Outcome: Progressing  Goal: Improved Oral Intake  Outcome: Progressing  Goal: Effective Renal Function  Outcome: Progressing     Problem: Skin Injury Risk Increased  Goal: Skin Health and Integrity  Outcome: Progressing     Problem: Fall Injury Risk  Goal: Absence of Fall and Fall-Related Injury  Outcome: Progressing

## 2025-01-29 NOTE — ASSESSMENT & PLAN NOTE
Last A1c reviewed-   Lab Results   Component Value Date    LABA1C 10.7 (H) 06/27/2018    HGBA1C 10.1 (H) 01/24/2025     Inpatient Antihyperglycemic Regiment:  Antihyperglycemics (From admission, onward)    Start     Stop Route Frequency Ordered    01/28/25 2100  insulin glargine U-100 (Lantus) pen 24 Units         -- SubQ Nightly 01/28/25 1347    01/27/25 1210  insulin aspart U-100 pen 0-10 Units         -- SubQ Before meals & nightly PRN 01/27/25 1211        Blood Sugars (AccuCheck):    Recent Labs     01/28/25  0558 01/28/25  1123 01/28/25  1715 01/28/25  2124 01/28/25  2346 01/29/25  0548   POCTGLUCOSE 182* 233* 256* 262* 189* 165*

## 2025-01-29 NOTE — PROGRESS NOTES
"[facility] ICU  Critical Care Medicine  Progress Note    Patient Name: Joe Morgan  MRN: [id number]  Admission Date: 1/23/2025  Hospital Length of Stay: 6 days  Code Status: Full Code  Attending Provider: Ron Mark MD  Primary Care Provider: Johnnei Gibbs MD   Principal Problem: Retroperitoneal hematoma    Subjective:     HPI:  63 y/o M with a medical hx of DM, hip fracture, GERD, HTN, HLD, and paroxysmal Afib who presented to [facility] as a transfer from [facility]. Pt originally presented to [facility] for R flank pain. He states he recently seen at [facility] for a kidney stone and thought pain was related to that.      Hospital Course from [facility]:  "Admitted and treated for UTI and hematoma of the right kidney after he presented with complaints of left flank pain and subjective fever. He had a CT of his abdomen and pelvis that was significant for right kidney pyleonephritis, trace right lung base pleural effusion. He was started on IVF and antibiotics. He continued to have pain and repeat CT was obtained and significant for GB distension, small pleural effusions, left inguinal hernia and interval development of subcapsular hematoma in the right kidney. He had follow up imaging the next day and had slight interval evolution of right subcapsular hemorrhage. He was on eliquis for afib which was stopped and his H/H was monitored. His imaging was also reviewed by Dr. Pina with urology for possible transfer but he recommended the patient continued to be monitored here. Did require 2 units of pRBC and blood cell counts have stabilized. The case was discussed with Dr. Pina who has agreed the patient is stable for discharge. Plan for follow up CT in 1 week and follow up with Dr. Pina after it is complete. Pt was deemed medically stable on day of discharge."    Patient states his pain immediately returned after being released. Pain located in the R lower back " "radiating around the R flank and into the R testicle. It is constant, stabbing, severe in nature. Nothing makes it better or worse. He took no medications prior to arrival treat pain. Denies any symptoms on review of systems at this time.    CTA Abdomen showed: Large right perinephric and midline retroperitoneal hematomas with active contrast extravasation. Several areas of cortical hypoattenuation of the right kidney, suspicious for low-grade renal lacerations or infarcts.     IR consulted for emergent embolization. Concern for rupture of R gonadal artery, with concerns patient may lose R testicle. Admitted to MICU for concerns of hemorrhagic shock. Uro consulted      Hospital/ICU Course:  Admitted to MICU due to high risk of decompensation 2/2 active bleed.  On 1/24, hemoglobin continued to trend down s/p 2 units of pRBCs.  Repeat CTA A/P did not show active hemorrhage but redemonstrated large right renal subcapsular hematoma with mass effect suggesting page kidney.  Started on oral hydralazine which was switched to lisinopril.  IR reconsulted for possible drainage of hematoma and recommended retroperitoneal U/S.  Patient's pain is currently uncontrolled. Patient's suboxone was spaced out to 1 film TID for longer pain control throughout the day. Uptitrate films as medically necessary. Hemoglobin trend stable. Ready for stepdown to hospital medicine.     No new subjective & objective note has been filed under this hospital service since the last note was generated.      ABG  No results for input(s): "PH", "PO2", "PCO2", "HCO3", "BE" in the last 168 hours.  Assessment/Plan:     Cardiac/Vascular  Paroxysmal atrial fibrillation  Hx of paroxysmal Afib. Currently in NSR.     Plan:  - Maintain K > 4, Mag > 2 and Ca/iCal WNL to decrease arrhythmogenic potential  - Rate control with metoprolol succinate 25 mg  - Emr chart review shows both metoprolol and Flecainide at home?? Pt unsure which if any he is taking  - " Anticoagulation with Eliquis at home, reports hasn't taken any within past 2 days. Will hold in setting of retroperitoneal bleed  - Maintain on telemetry     Essential hypertension  Takes lisinopril 40 mg and metoprolol succinate 25 mg as home medications. HDS upon admission and s/p embolization.     - restart lisinopril  - add amlodipine for tighter bp control in the setting of RP bleed.   - add hydral 25mg q8    Vitals:    01/28/25 2100 01/28/25 2200 01/28/25 2300 01/29/25 0000   BP: (!) 151/70 (!) 150/70 (!) 150/69 (!) 172/79    01/29/25 0100 01/29/25 0213 01/29/25 0300 01/29/25 0400   BP: (!) 158/85 (!) 207/91 (!) 160/79 (!) 178/85    01/29/25 0500 01/29/25 0701   BP: 136/66 (!) 146/69        Endocrine  Type 2 diabetes mellitus  Last A1c reviewed-   Lab Results   Component Value Date    LABA1C 10.7 (H) 06/27/2018    HGBA1C 10.1 (H) 01/24/2025     Inpatient Antihyperglycemic Regiment:  Antihyperglycemics (From admission, onward)      Start     Stop Route Frequency Ordered    01/28/25 2100  insulin glargine U-100 (Lantus) pen 24 Units         -- SubQ Nightly 01/28/25 1347    01/27/25 1210  insulin aspart U-100 pen 0-10 Units         -- SubQ Before meals & nightly PRN 01/27/25 1211          Blood Sugars (AccuCheck):    Recent Labs     01/28/25  0558 01/28/25  1123 01/28/25  1715 01/28/25  2124 01/28/25  2346 01/29/25  0548   POCTGLUCOSE 182* 233* 256* 262* 189* 165*         Obesity  Body mass index is 35.63 kg/m². Morbid obesity complicates all aspects of disease management from diagnostic modalities to treatment. Weight loss encouraged and health benefits explained to patient.     GI  * Retroperitoneal hematoma  65 y/o M with a medical hx of DM, hip fracture, GERD, HTN, HLD, and paroxysmal Afib who presented to [facility] as a transfer from [place] ED. Pt originally presented to [place] ED for R flank pain. He states he recently seen at [facility] for a kidney stone and thought pain was related to  "that.      CTA Abdomen showed: Large right perinephric and midline retroperitoneal hematomas with active contrast extravasation. Several areas of cortical hypoattenuation of the right kidney, suspicious for low-grade renal lacerations or infarcts.     IR consulted for emergent embolization. Concern for rupture of R gonadal artery, with concerns patient may lose R testicle. Admitted to MICU for concerns of hemorrhagic shock. HDS upon admission. Hgb stable at 10, baseline around 14. Now s/p IR embolization.  Urology consulted, Embolization of gonadal unlikely to cause testicular atrophy due collateral blood supply. Repeat CTA on 11/25 did not show active hemorrhage but redemonstrated large right renal subcapsular hematoma with mass effect suggesting page kidney.  IR consulted for possible drainage of hematoma.  Per IR: "Given dense appearance of hematoma on CT, a percutaneous drain is unlikely to decompress the hematoma. However, recommend right renal ultrasound to evaluate for any lower density regions of the hematoma that may be amendable to drainage."     Plan:  - Renal US : Large right subcapsular hematoma. No hydronephrosis. Elevated resistive index possibly secondary to compression of renal parenchyma.   - Will trend hemoglobin and monitor for hemodynamic instability.  - Transfuse PRN  - holding AC meds; plan to resume if hemoglobin is stable for more than 24 hours  - work up for aquired coagulapathy.     Abdominal distention  Complaints of abdominal distention and mild tenderness since 1/24.  Reports having normal bowel movements every 3-4 days.  Patient reports being able to pass gas.  CTA AP from 01/25 showed decompressed stomach with perigastric soft tissue stranding posteriorly.  No large bowel obstruction noted.  Moderate volume stool throughout the colon.    - start MiraLax b.i.d. and senna/docusate       Critical Care Daily Checklist:    A: Awake: RASS Goal/Actual Goal:    Actual:     B: Spontaneous " Breathing Trial Performed?     C: SAT & SBT Coordinated?  N/a                      D: Delirium: CAM-ICU     E: Early Mobility Performed? Yes   F: Feeding Goal:    Status:     Current Diet Order   Procedures    Diet Adult Regular Consistent Carbohydrate     Order Specific Question:   Additional Diet Options:     Answer:   Consistent Carbohydrate      AS: Analgesia/Sedation Suboxone TID   T: Thromboembolic Prophylaxis none   H: HOB > 300 Yes   U: Stress Ulcer Prophylaxis (if needed) none   G: Glucose Control Glargine 24u w/ SS   B: Bowel Function Stool Occurrence: 1   I: Indwelling Catheter (Lines & Hood) Necessity PIV   D: De-escalation of Antimicrobials/Pharmacotherapies none    Plan for the day/ETD Plan for discharge with PT/OT eval      Code Status:  Family/Goals of Care: Full Code       Critical secondary to Patient is currently receiving parenteral controlled substances: suboxone      Critical care was time spent personally by me on the following activities: development of treatment plan with patient or surrogate and bedside caregivers, discussions with consultants, evaluation of patient's response to treatment, examination of patient, ordering and performing treatments and interventions, ordering and review of laboratory studies, ordering and review of radiographic studies, pulse oximetry, re-evaluation of patient's condition. This critical care time did not overlap with that of any other provider or involve time for any procedures.     Javier Ortiz MD  Critical Care Medicine  [facility] - Medical ICU

## 2025-01-29 NOTE — PLAN OF CARE
MICU DAILY GOALS     Family/Goals of care/Code Status   Code Status: Full Code    24H Vital Sign Range  Temp:  [97.9 °F (36.6 °C)-98.8 °F (37.1 °C)]   Pulse:  [61-95]   Resp:  [10-33]   BP: (129-207)/(62-91)   SpO2:  [90 %-99 %]      Shift Events (include procedures and significant events)   No acute events throughout shift    AWAKE RASS: Goal -    Actual - RASS (Paz Agitation-Sedation Scale): alert and calm    Restraint necessity: Not necessary   BREATHE SBT: Not intubated    Coordinate A & B, analgesics/sedatives Pain: managed   SAT: Not intubated   Delirium CAM-ICU:     Early(intubated/ Progressive (non-intubated) Mobility MOVE Screen (INTUBATED ONLY): Not intubated    Activity: Activity Management: Rolling - L1   Feeding/Nutrition Diet order: Diet/Nutrition Received: consistent carb/diabetic diet,     Thrombus DVT prophylaxis: VTE Core Measure: Per order contraindicated for SCDs/Anticoagulants   HOB Elevation Head of Bed (HOB) Positioning: HOB at 30 degrees   Ulcer Prophylaxis GI: yes   Glucose control managed Glycemic Management: blood glucose monitored   Skin Skin assessment:     Sacrum intact/not altered? Yes  Heels intact/not altered? Yes  Surgical wound? Yes    CHECK ONE!   (no altered skin or altered skin) and sub boxes:  [] No Altered Skin Integrity Present    []Prevention Measures Documented    [x] Altered Skin Integrity Present or Discovered   [] LDA present in EPIC, daily doc completed              [] LDA added if not in EPIC (describe wound).                    When describing wound, do not stage, use descriptive words only.    [] Wound Image Taken (required on admit,                   transfer/discharge and every Tuesday)    Wound Care Consulted? No   Bowel Function no issues    Indwelling Catheter Necessity            De-escalation Antibiotics No        VS and assessment per flow sheet, patient progressing towards goals as tolerated, plan of care reviewed with  pt , all concerns addressed.

## 2025-01-29 NOTE — ASSESSMENT & PLAN NOTE
Takes lisinopril 40 mg and metoprolol succinate 25 mg as home medications. HDS upon admission and s/p embolization.     - restart lisinopril  - add amlodipine for tighter bp control in the setting of RP bleed.   - add hydral 25mg q8    Vitals:    01/28/25 2100 01/28/25 2200 01/28/25 2300 01/29/25 0000   BP: (!) 151/70 (!) 150/70 (!) 150/69 (!) 172/79    01/29/25 0100 01/29/25 0213 01/29/25 0300 01/29/25 0400   BP: (!) 158/85 (!) 207/91 (!) 160/79 (!) 178/85    01/29/25 0500 01/29/25 0701   BP: 136/66 (!) 146/69

## 2025-01-29 NOTE — ASSESSMENT & PLAN NOTE
"63 y/o M with a medical hx of DM, hip fracture, GERD, HTN, HLD, and paroxysmal Afib who presented to [facility] as a transfer from [place] [facility]. Pt originally presented to [place] ED for R flank pain. He states he recently seen at [facility] for a kidney stone and thought pain was related to that.      CTA Abdomen showed: Large right perinephric and midline retroperitoneal hematomas with active contrast extravasation. Several areas of cortical hypoattenuation of the right kidney, suspicious for low-grade renal lacerations or infarcts.     IR consulted for emergent embolization. Concern for rupture of R gonadal artery, with concerns patient may lose R testicle. Admitted to MICU for concerns of hemorrhagic shock. HDS upon admission. Hgb stable at 10, baseline around 14. Now s/p IR embolization.  Urology consulted, Embolization of gonadal unlikely to cause testicular atrophy due collateral blood supply. Repeat CTA on 11/25 did not show active hemorrhage but redemonstrated large right renal subcapsular hematoma with mass effect suggesting page kidney.  IR consulted for possible drainage of hematoma.  Per IR: "Given dense appearance of hematoma on CT, a percutaneous drain is unlikely to decompress the hematoma. However, recommend right renal ultrasound to evaluate for any lower density regions of the hematoma that may be amendable to drainage."     Plan:  - Renal US : Large right subcapsular hematoma. No hydronephrosis. Elevated resistive index possibly secondary to compression of renal parenchyma.   - Will trend hemoglobin and monitor for hemodynamic instability.  - Transfuse PRN  - holding AC meds; plan to resume if hemoglobin is stable for more than 24 hours  - work up for aquired coagulapathy.   "

## 2025-01-30 PROBLEM — D62 ACUTE BLOOD LOSS ANEMIA: Status: ACTIVE | Noted: 2025-01-30

## 2025-01-30 PROBLEM — N32.89 BLADDER SPASMS: Status: ACTIVE | Noted: 2025-01-30

## 2025-01-30 LAB
ALBUMIN SERPL BCP-MCNC: 2.1 G/DL (ref 3.5–5.2)
ALP SERPL-CCNC: 79 U/L (ref 40–150)
ALT SERPL W/O P-5'-P-CCNC: 20 U/L (ref 10–44)
ANION GAP SERPL CALC-SCNC: 7 MMOL/L (ref 8–16)
AST SERPL-CCNC: 16 U/L (ref 10–40)
BASOPHILS # BLD AUTO: 0.05 K/UL (ref 0–0.2)
BASOPHILS NFR BLD: 0.6 % (ref 0–1.9)
BILIRUB SERPL-MCNC: 0.4 MG/DL (ref 0.1–1)
BLD PROD TYP BPU: NORMAL
BLOOD UNIT EXPIRATION DATE: NORMAL
BLOOD UNIT TYPE CODE: 6200
BLOOD UNIT TYPE: NORMAL
BUN SERPL-MCNC: 25 MG/DL (ref 8–23)
CALCIUM SERPL-MCNC: 8.6 MG/DL (ref 8.7–10.5)
CHLORIDE SERPL-SCNC: 103 MMOL/L (ref 95–110)
CO2 SERPL-SCNC: 21 MMOL/L (ref 23–29)
CODING SYSTEM: NORMAL
CREAT SERPL-MCNC: 0.8 MG/DL (ref 0.5–1.4)
CROSSMATCH INTERPRETATION: NORMAL
DIFFERENTIAL METHOD BLD: ABNORMAL
DISPENSE STATUS: NORMAL
EOSINOPHIL # BLD AUTO: 0.1 K/UL (ref 0–0.5)
EOSINOPHIL NFR BLD: 1.5 % (ref 0–8)
ERYTHROCYTE [DISTWIDTH] IN BLOOD BY AUTOMATED COUNT: 15.9 % (ref 11.5–14.5)
EST. GFR  (NO RACE VARIABLE): >60 ML/MIN/1.73 M^2
GLUCOSE SERPL-MCNC: 179 MG/DL (ref 70–110)
HCT VFR BLD AUTO: 27.7 % (ref 40–54)
HGB BLD-MCNC: 8.7 G/DL (ref 14–18)
IMM GRANULOCYTES # BLD AUTO: 0.12 K/UL (ref 0–0.04)
IMM GRANULOCYTES NFR BLD AUTO: 1.4 % (ref 0–0.5)
LYMPHOCYTES # BLD AUTO: 0.8 K/UL (ref 1–4.8)
LYMPHOCYTES NFR BLD: 9 % (ref 18–48)
MAGNESIUM SERPL-MCNC: 2.1 MG/DL (ref 1.6–2.6)
MCH RBC QN AUTO: 27.5 PG (ref 27–31)
MCHC RBC AUTO-ENTMCNC: 31.4 G/DL (ref 32–36)
MCV RBC AUTO: 88 FL (ref 82–98)
MONOCYTES # BLD AUTO: 0.7 K/UL (ref 0.3–1)
MONOCYTES NFR BLD: 8 % (ref 4–15)
NEUTROPHILS # BLD AUTO: 6.9 K/UL (ref 1.8–7.7)
NEUTROPHILS NFR BLD: 79.5 % (ref 38–73)
NRBC BLD-RTO: 0 /100 WBC
PHOSPHATE SERPL-MCNC: 3.2 MG/DL (ref 2.7–4.5)
PLATELET # BLD AUTO: 486 K/UL (ref 150–450)
PMV BLD AUTO: 8.8 FL (ref 9.2–12.9)
POCT GLUCOSE: 171 MG/DL (ref 70–110)
POCT GLUCOSE: 206 MG/DL (ref 70–110)
POCT GLUCOSE: 250 MG/DL (ref 70–110)
POCT GLUCOSE: 298 MG/DL (ref 70–110)
POTASSIUM SERPL-SCNC: 4.1 MMOL/L (ref 3.5–5.1)
PROT SERPL-MCNC: 7.3 G/DL (ref 6–8.4)
RBC # BLD AUTO: 3.16 M/UL (ref 4.6–6.2)
SODIUM SERPL-SCNC: 131 MMOL/L (ref 136–145)
TRANS ERYTHROCYTES VOL PATIENT: NORMAL ML
WBC # BLD AUTO: 8.65 K/UL (ref 3.9–12.7)

## 2025-01-30 PROCEDURE — 25000003 PHARM REV CODE 250

## 2025-01-30 PROCEDURE — 63600175 PHARM REV CODE 636 W HCPCS

## 2025-01-30 PROCEDURE — 80053 COMPREHEN METABOLIC PANEL: CPT

## 2025-01-30 PROCEDURE — 20600001 HC STEP DOWN PRIVATE ROOM

## 2025-01-30 PROCEDURE — 85025 COMPLETE CBC W/AUTO DIFF WBC: CPT | Performed by: INTERNAL MEDICINE

## 2025-01-30 PROCEDURE — 99233 SBSQ HOSP IP/OBS HIGH 50: CPT | Mod: ,,, | Performed by: INTERNAL MEDICINE

## 2025-01-30 PROCEDURE — 94761 N-INVAS EAR/PLS OXIMETRY MLT: CPT

## 2025-01-30 PROCEDURE — 25000003 PHARM REV CODE 250: Performed by: INTERNAL MEDICINE

## 2025-01-30 PROCEDURE — 84100 ASSAY OF PHOSPHORUS: CPT

## 2025-01-30 PROCEDURE — 97161 PT EVAL LOW COMPLEX 20 MIN: CPT

## 2025-01-30 PROCEDURE — 83735 ASSAY OF MAGNESIUM: CPT

## 2025-01-30 PROCEDURE — 97165 OT EVAL LOW COMPLEX 30 MIN: CPT

## 2025-01-30 PROCEDURE — 63600175 PHARM REV CODE 636 W HCPCS: Performed by: INTERNAL MEDICINE

## 2025-01-30 RX ORDER — BUPRENORPHINE AND NALOXONE 8; 2 MG/1; MG/1
1 FILM, SOLUBLE BUCCAL; SUBLINGUAL 4 TIMES DAILY
Status: DISCONTINUED | OUTPATIENT
Start: 2025-01-30 | End: 2025-01-31

## 2025-01-30 RX ORDER — INSULIN GLARGINE 100 [IU]/ML
30 INJECTION, SOLUTION SUBCUTANEOUS NIGHTLY
Status: DISCONTINUED | OUTPATIENT
Start: 2025-01-30 | End: 2025-02-01 | Stop reason: HOSPADM

## 2025-01-30 RX ORDER — ENOXAPARIN SODIUM 100 MG/ML
40 INJECTION SUBCUTANEOUS EVERY 24 HOURS
Status: DISCONTINUED | OUTPATIENT
Start: 2025-01-30 | End: 2025-02-01 | Stop reason: HOSPADM

## 2025-01-30 RX ORDER — POLYETHYLENE GLYCOL 3350 17 G/17G
17 POWDER, FOR SOLUTION ORAL DAILY
Status: DISCONTINUED | OUTPATIENT
Start: 2025-01-31 | End: 2025-02-01 | Stop reason: HOSPADM

## 2025-01-30 RX ADMIN — HYDRALAZINE HYDROCHLORIDE 25 MG: 25 TABLET ORAL at 02:01

## 2025-01-30 RX ADMIN — METHOCARBAMOL 500 MG: 500 TABLET ORAL at 06:01

## 2025-01-30 RX ADMIN — OXYBUTYNIN CHLORIDE 5 MG: 5 TABLET, EXTENDED RELEASE ORAL at 08:01

## 2025-01-30 RX ADMIN — ENOXAPARIN SODIUM 40 MG: 40 INJECTION SUBCUTANEOUS at 04:01

## 2025-01-30 RX ADMIN — BUPRENORPHINE AND NALOXONE 1 FILM: 8; 2 FILM BUCCAL; SUBLINGUAL at 06:01

## 2025-01-30 RX ADMIN — INSULIN GLARGINE 30 UNITS: 100 INJECTION, SOLUTION SUBCUTANEOUS at 09:01

## 2025-01-30 RX ADMIN — BUPRENORPHINE AND NALOXONE 1 FILM: 8; 2 FILM BUCCAL; SUBLINGUAL at 12:01

## 2025-01-30 RX ADMIN — METHOCARBAMOL 500 MG: 500 TABLET ORAL at 11:01

## 2025-01-30 RX ADMIN — AMLODIPINE BESYLATE 10 MG: 10 TABLET ORAL at 08:01

## 2025-01-30 RX ADMIN — MUPIROCIN: 20 OINTMENT TOPICAL at 09:01

## 2025-01-30 RX ADMIN — LISINOPRIL 40 MG: 20 TABLET ORAL at 08:01

## 2025-01-30 RX ADMIN — BUPRENORPHINE AND NALOXONE 1 FILM: 8; 2 FILM BUCCAL; SUBLINGUAL at 09:01

## 2025-01-30 RX ADMIN — HYDRALAZINE HYDROCHLORIDE 25 MG: 25 TABLET ORAL at 06:01

## 2025-01-30 RX ADMIN — METOPROLOL SUCCINATE 25 MG: 25 TABLET, EXTENDED RELEASE ORAL at 08:01

## 2025-01-30 RX ADMIN — HYDRALAZINE HYDROCHLORIDE 25 MG: 25 TABLET ORAL at 09:01

## 2025-01-30 RX ADMIN — INSULIN ASPART 4 UNITS: 100 INJECTION, SOLUTION INTRAVENOUS; SUBCUTANEOUS at 04:01

## 2025-01-30 RX ADMIN — METHOCARBAMOL 500 MG: 500 TABLET ORAL at 05:01

## 2025-01-30 RX ADMIN — INSULIN ASPART 3 UNITS: 100 INJECTION, SOLUTION INTRAVENOUS; SUBCUTANEOUS at 09:01

## 2025-01-30 RX ADMIN — BUPRENORPHINE AND NALOXONE 1 FILM: 8; 2 FILM BUCCAL; SUBLINGUAL at 04:01

## 2025-01-30 RX ADMIN — METHOCARBAMOL 500 MG: 500 TABLET ORAL at 12:01

## 2025-01-30 RX ADMIN — INSULIN ASPART 2 UNITS: 100 INJECTION, SOLUTION INTRAVENOUS; SUBCUTANEOUS at 07:01

## 2025-01-30 NOTE — ASSESSMENT & PLAN NOTE
Patient has paroxysmal (<7 days) atrial fibrillation. Patient is currently in sinus rhythm. [id number] Score: 2 (DM and HTN). The patients heart rate in the last 24 hours is as follows:  Pulse  Min: 59  Max: 88     Antiarrhythmics  metoprolol succinate (TOPROL-XL) 24 hr tablet 25 mg, Daily, Oral    Anticoagulants  enoxaparin injection 40 mg, Every 24 hours, Subcutaneous    Plan  - Replete lytes with a goal of K>4, Mg >2  - Patient is not anticoagulated due to RP bleed- holding home AC  - Patient's afib is currently controlled  - Starting trial of Lovenox ppx, if hb stable will start DOAC per discussion with cardiology and monitor for a day before discharge

## 2025-01-30 NOTE — NURSING
Pt transferred to the room via wheelchair from MICU. Pt AAO x 4, pain rated 2/10 to right flank. VSS, Telebox in use and verified with tele room. Assessment and 4 eyes done. R groin site dressing is clean dry and intact. Urinal placed in the restroom for pt. Pt oriented to room/unit. Safety measures in place. Side rails up, call light in reach. Monitoring.

## 2025-01-30 NOTE — ASSESSMENT & PLAN NOTE
Last A1c reviewed-   Lab Results   Component Value Date    LABA1C 10.7 (H) 06/27/2018    HGBA1C 10.1 (H) 01/24/2025     Inpatient Antihyperglycemic Regiment:  Antihyperglycemics (From admission, onward)    Start     Stop Route Frequency Ordered    01/29/25 2100  insulin glargine U-100 (Lantus) pen 27 Units         -- SubQ Nightly 01/29/25 1720    01/29/25 1819  insulin aspart U-100 pen 0-10 Units         -- SubQ Before meals & nightly PRN 01/29/25 1719        Blood Sugars (AccuCheck):    Recent Labs     01/28/25  2346 01/29/25  0548 01/29/25  1129 01/29/25  1709 01/29/25  2139 01/30/25  0702   POCTGLUCOSE 189* 165* 277* 282* 283* 171*

## 2025-01-30 NOTE — PLAN OF CARE
[facility] Medical [facility]  Discharge Reassessment    Primary Care Provider: Johnnie Gibbs MD    Expected Discharge Date: 1/31/2025    Reassessment (most recent)       Discharge Reassessment - 01/30/25 0838          Discharge Reassessment    Assessment Type Discharge Planning Reassessment     Did the patient's condition or plan change since previous assessment? Yes     Discharge Plan discussed with: Spouse/sig other;Patient     Name(s) and Number(s) Mindi Morgan, spouse cp# [phone]     Communicated JUSTA with patient/caregiver Date not available/Unable to determine     Discharge Plan A Home with family     Discharge Plan B Home with family;Home Health     DME Needed Upon Discharge  other (see comments)   TBD    Transition of Care Barriers None     Why the patient remains in the hospital Requires continued medical care        Post-Acute Status    Post-Acute Authorization Home Health     Home Health Status Pending medical clearance/testing     Coverage BCBS/All out of state     Discharge Delays None known at this time                     Patient not stable for discharge per medical tx team.  SW will continue to follow patient's progress to discharge from MICU.  CM dept remains available for any family concerns or needs.    Graciela Montez, LITO  [facility]  [phone]

## 2025-01-30 NOTE — PROGRESS NOTES
"[facility] - Acute Medical Stepdown  Adult Nutrition  Progress Note    SUMMARY   Recommendations  Continue carb controlled diet  Monitor labs, meds, skin    Goals: Meet % een/epn by next RD f/u  Nutrition Goal Status: new  Communication of RD Recs: other (comment) (poc)    Assessment and Plan    No nutrition diagnosis at this time    Reason for Assessment    Reason For Assessment: RD follow-up  Diagnosis: other (see comments) (Retroperitoneal hematoma)    General Information Comments: 64 y.o. male with a history of right subcapsular renal hematoma and RP hematoma s/p R testicular artery embolization, DM, HLD, HTN, GERD. Admitted with retroperitoneal hematoma. RD assessment; last seen by RD 6 days ago for DM diet education. Per RN flowsheets pt with 100% intake of meals today. No nutritionally significant wounds noted. No wt loss noted. NFPE not warranted at this time.     Nutrition Discharge Planning: Diabetic diet    Nutrition Related Social Determinants of Health: SDOH: None Identified  Food Insecurity: No Food Insecurity (1/24/2025)    Hunger Vital Sign     Worried About Running Out of Food in the Last Year: Never true     Ran Out of Food in the Last Year: Never true     Nutrition/Diet History    Spiritual, Cultural Beliefs, Hindu Practices, Values that Affect Care: no  Food Allergies: NKFA    Anthropometrics    Height: 5' 10" (177.8 cm)  Height (inches): 70 in  Weight: 112.6 kg (248 lb 4.8 oz)  Weight (lb): 248.3 lb  Ideal Body Weight (IBW), Male: 166 lb  BMI (Calculated): 35.6  BMI Grade: 35 - 39.9 - obesity - grade II       Lab/Procedures/Meds    Pertinent Labs Reviewed: reviewed  Pertinent Labs Comments: Na: 131, BUN: 25, Glu: 17  Pertinent Medications Reviewed: reviewed  Pertinent Medications Comments: Insulin, lisinopril, bowel reg    Estimated/Assessed Needs    Weight Used For Calorie Calculations: 112.6 kg (248 lb 3.8 oz)  Energy Calorie Requirements (kcal): 1932 (msj)  Energy Need Method: " [facility]  Protein Requirements: 112-135 (1-1.2 g/kg)  Weight Used For Protein Calculations: 112.6 kg (248 lb 3.8 oz)     Estimated Fluid Requirement Method: RDA Method  RDA Method (mL): 1932  CHO Requirement: 217-313 (45-65%)      Nutrition Prescription Ordered    Current Diet Order: Regular- consistent carbohydrate    Evaluation of Received Nutrient/Fluid Intake    I/O: -12.2 L since admit  Comments: LBM 1/29  % Intake of Estimated Energy Needs: 75 - 100 %  % Meal Intake: 75 - 100 %    Nutrition Risk    Level of Risk/Frequency of Follow-up: low (f/u 1-2x/week)     Monitor and Evaluation    Food and Nutrient Intake: energy intake, food and beverage intake  Food and Nutrient Adminstration: diet order  Knowledge/Beliefs/Attitudes: food and nutrition knowledge/skill, beliefs and attitudes  Physical Activity and Function: nutrition-related ADLs and IADLs  Anthropometric Measurements: height/length, weight, weight change, body mass index  Biochemical Data, Medical Tests and Procedures: electrolyte and renal panel, gastrointestinal profile, glucose/endocrine profile, inflammatory profile, lipid profile  Nutrition-Focused Physical Findings: overall appearance, extremities, muscles and bones, head and eyes, skin     Nutrition Follow-Up    RD Follow-up?: Yes

## 2025-01-30 NOTE — SUBJECTIVE & OBJECTIVE
Interval History:     Seen and examined at bedside. Complaining of persistent right flank pain but reports its a little better 3/10. Recently had painful bladder spasms when urination is ceased.      Enoxaparin ordered by ICU to start today at 1700.     Review of Systems   Genitourinary:  Positive for flank pain.   All other systems reviewed and are negative.    Objective:     Vital Signs (Most Recent):  Temp: 98.4 °F (36.9 °C) (01/30/25 1142)  Pulse: 72 (01/30/25 1106)  Resp: 12 (01/30/25 1106)  BP: 129/69 (01/30/25 1106)  SpO2: 100 % (01/30/25 1106) Vital Signs (24h Range):  Temp:  [96.1 °F (35.6 °C)-98.8 °F (37.1 °C)] 98.4 °F (36.9 °C)  Pulse:  [59-88] 72  Resp:  [12-20] 12  SpO2:  [93 %-100 %] 100 %  BP: (117-170)/(62-94) 129/69   Weight: 112.6 kg (248 lb 4.8 oz)  Body mass index is 35.63 kg/m².      Intake/Output Summary (Last 24 hours) at 1/30/2025 1240  Last data filed at 1/30/2025 0701  Gross per 24 hour   Intake 870 ml   Output 1300 ml   Net -430 ml          Physical Exam  Vitals and nursing note reviewed.   Constitutional:       Appearance: He is well-developed.   HENT:      Head: Normocephalic.      Mouth/Throat:      Mouth: Mucous membranes are moist.   Eyes:      Pupils: Pupils are equal, round, and reactive to light.   Cardiovascular:      Rate and Rhythm: Normal rate and regular rhythm.      Pulses: Normal pulses.   Pulmonary:      Effort: Pulmonary effort is normal.   Abdominal:      General: Bowel sounds are normal. There is no distension.      Palpations: Abdomen is soft.      Tenderness: There is no abdominal tenderness. There is no guarding or rebound. R flank pain      Hernia: No hernia is present.   Musculoskeletal:         General: No swelling.   Skin:     General: Skin is warm and dry.   Neurological:      Mental Status: He is alert.   Psychiatric:         Mood and Affect: Mood normal.         Behavior: Behavior normal.         Thought Content: Thought content normal.         Judgment:  Judgment normal.        Vents:     Lines/Drains/Airways       Peripheral Intravenous Line  Duration                  Peripheral IV - Single Lumen 01/23/25 2110 20 G Anterior;Distal;Left Forearm 6 days         Peripheral IV - Single Lumen 01/26/25 1726 18 G 1 in Anterior;Right Upper Arm 3 days         Peripheral IV - Single Lumen 01/27/25 1209 20 G 1 3/4 in Anterior;Left;Proximal Forearm 3 days                  Significant Labs:    CBC/Anemia Profile:  Recent Labs   Lab 01/29/25  0342 01/30/25  0326   WBC 9.62 8.65   HGB 9.3* 8.7*   HCT 28.8* 27.7*   * 486*   MCV 88 88   RDW 15.9* 15.9*        Chemistries:  Recent Labs   Lab 01/29/25  0342 01/30/25  0326   * 131*   K 4.4 4.1    103   CO2 23 21*   BUN 23 25*   CREATININE 0.9 0.8   CALCIUM 8.9 8.6*   ALBUMIN 2.2* 2.1*   PROT 7.7 7.3   BILITOT 0.6 0.4   ALKPHOS 89 79   ALT 22 20   AST 25 16   MG 2.0 2.1   PHOS 3.7 3.2       All pertinent labs within the past 24 hours have been reviewed.    Significant Imaging:  I have reviewed all pertinent imaging results/findings within the past 24 hours.

## 2025-01-30 NOTE — ASSESSMENT & PLAN NOTE
Anemia is likely due to acute blood loss which was from RP bleed . Most recent hemoglobin and hematocrit are listed below.    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  - Patient has received 3 units of PRBCs on 1/24 and 1/26  - Patient's anemia is currently stable  - Monitor as started on dvt ppx  - Plan for DOAC initiation if stable

## 2025-01-30 NOTE — PROGRESS NOTES
"[facility] - [facility] Stepdown  Critical Care Medicine  Progress Note    Patient Name: Joe Morgan  MRN: [id number]  Admission Date: 1/23/2025  Hospital Length of Stay: 7 days  Code Status: Full Code  Attending Provider: Augustin Oliva MD  Primary Care Provider: Johnnie Gibbs MD   Principal Problem: Retroperitoneal hematoma    Subjective:     HPI:  63 y/o M with a medical hx of DM, hip fracture, GERD, HTN, HLD, and paroxysmal Afib who presented to [facility] as a transfer from [facility]. Pt originally presented to [facility] for R flank pain. He states he recently seen at [facility] for a kidney stone and thought pain was related to that.      Hospital Course from [facility]:  "Admitted and treated for UTI and hematoma of the right kidney after he presented with complaints of left flank pain and subjective fever. He had a CT of his abdomen and pelvis that was significant for right kidney pyleonephritis, trace right lung base pleural effusion. He was started on IVF and antibiotics. He continued to have pain and repeat CT was obtained and significant for GB distension, small pleural effusions, left inguinal hernia and interval development of subcapsular hematoma in the right kidney. He had follow up imaging the next day and had slight interval evolution of right subcapsular hemorrhage. He was on eliquis for afib which was stopped and his H/H was monitored. His imaging was also reviewed by Dr. Pina with urology for possible transfer but he recommended the patient continued to be monitored here. Did require 2 units of pRBC and blood cell counts have stabilized. The case was discussed with Dr. Pina who has agreed the patient is stable for discharge. Plan for follow up CT in 1 week and follow up with Dr. Pina after it is complete. Pt was deemed medically stable on day of discharge."    Patient states his pain immediately returned after being released. Pain located in the R lower back " radiating around the R flank and into the R testicle. It is constant, stabbing, severe in nature. Nothing makes it better or worse. He took no medications prior to arrival treat pain. Denies any symptoms on review of systems at this time.    CTA Abdomen showed: Large right perinephric and midline retroperitoneal hematomas with active contrast extravasation. Several areas of cortical hypoattenuation of the right kidney, suspicious for low-grade renal lacerations or infarcts.     IR consulted for emergent embolization. Concern for rupture of R gonadal artery, with concerns patient may lose R testicle. Admitted to MICU for concerns of hemorrhagic shock. Uro consulted      Hospital/ICU Course:  Admitted to MICU due to high risk of decompensation 2/2 active bleed.  On 1/24, hemoglobin continued to trend down s/p 2 units of pRBCs.  Repeat CTA A/P did not show active hemorrhage but redemonstrated large right renal subcapsular hematoma with mass effect suggesting page kidney.  Started on oral hydralazine which was switched to lisinopril.  IR reconsulted for possible drainage of hematoma and recommended retroperitoneal U/S.  Patient's pain is currently uncontrolled. Patient's suboxone was spaced out to 1 film TID for longer pain control throughout the day. Uptitrate films as medically necessary. Hemoglobin trend stable. Restarted lovenox. Ready for stepdown to hospital medicine.     Interval History/Significant Events: Patient complained of some right flank pain overnight and received a 0.5 mg IV dose of dilaudid.     Uptitrated suboxone to 1 film QID. Tolerated PT/OT.     Review of Systems   Constitutional: Negative.    All other systems reviewed and are negative.    Objective:     Vital Signs (Most Recent):  Temp: 98.4 °F (36.9 °C) (01/30/25 1142)  Pulse: 72 (01/30/25 1106)  Resp: 12 (01/30/25 1106)  BP: 129/69 (01/30/25 1106)  SpO2: 100 % (01/30/25 1106) Vital Signs (24h Range):  Temp:  [96.1 °F (35.6 °C)-98.8 °F (37.1  °C)] 98.4 °F (36.9 °C)  Pulse:  [59-88] 72  Resp:  [12-20] 12  SpO2:  [93 %-100 %] 100 %  BP: (117-170)/(62-94) 129/69   Weight: 112.6 kg (248 lb 4.8 oz)  Body mass index is 35.63 kg/m².      Intake/Output Summary (Last 24 hours) at 1/30/2025 1310  Last data filed at 1/30/2025 0701  Gross per 24 hour   Intake 870 ml   Output 1300 ml   Net -430 ml          Physical Exam  Vitals and nursing note reviewed.   Constitutional:       General: He is not in acute distress.     Appearance: Normal appearance. He is normal weight.   HENT:      Head: Normocephalic and atraumatic.      Mouth/Throat:      Mouth: Mucous membranes are moist.   Eyes:      Extraocular Movements: Extraocular movements intact.      Pupils: Pupils are equal, round, and reactive to light.   Cardiovascular:      Rate and Rhythm: Normal rate and regular rhythm.      Pulses: Normal pulses.      Heart sounds: Normal heart sounds.   Pulmonary:      Effort: Pulmonary effort is normal.      Breath sounds: Normal breath sounds.   Abdominal:      General: Abdomen is flat.   Musculoskeletal:         General: Normal range of motion.      Cervical back: Normal range of motion.   Skin:     General: Skin is warm.      Capillary Refill: Capillary refill takes less than 2 seconds.   Neurological:      General: No focal deficit present.      Mental Status: He is alert and oriented to person, place, and time.            Vents:     Lines/Drains/Airways       Peripheral Intravenous Line  Duration                  Peripheral IV - Single Lumen 01/23/25 2110 20 G Anterior;Distal;Left Forearm 6 days         Peripheral IV - Single Lumen 01/26/25 1726 18 G 1 in Anterior;Right Upper Arm 3 days         Peripheral IV - Single Lumen 01/27/25 1209 20 G 1 3/4 in Anterior;Left;Proximal Forearm 3 days                  Significant Labs:    CBC/Anemia Profile:  Recent Labs   Lab 01/29/25  0342 01/30/25  0326   WBC 9.62 8.65   HGB 9.3* 8.7*   HCT 28.8* 27.7*   * 486*   MCV 88 88   RDW  "15.9* 15.9*        Chemistries:  Recent Labs   Lab 01/29/25  0342 01/30/25  0326   * 131*   K 4.4 4.1    103   CO2 23 21*   BUN 23 25*   CREATININE 0.9 0.8   CALCIUM 8.9 8.6*   ALBUMIN 2.2* 2.1*   PROT 7.7 7.3   BILITOT 0.6 0.4   ALKPHOS 89 79   ALT 22 20   AST 25 16   MG 2.0 2.1   PHOS 3.7 3.2         Significant Imaging:  I have reviewed all pertinent imaging results/findings within the past 24 hours.    ABG  No results for input(s): "PH", "PO2", "PCO2", "HCO3", "BE" in the last 168 hours.  Assessment/Plan:     Cardiac/Vascular  Paroxysmal atrial fibrillation  Hx of paroxysmal Afib. Currently in NSR.     Plan:  - Maintain K > 4, Mag > 2 and Ca/iCal WNL to decrease arrhythmogenic potential  - Rate control with metoprolol succinate 25 mg  - Emr chart review shows both metoprolol and Flecainide at home?? Pt unsure which if any he is taking  - Anticoagulation with Eliquis at home, reports hasn't taken any within past 2 days. Will hold in setting of retroperitoneal bleed  - Maintain on telemetry     Essential hypertension  Takes lisinopril 40 mg and metoprolol succinate 25 mg as home medications. HDS upon admission and s/p embolization.     - restart lisinopril  - add amlodipine for tighter bp control in the setting of RP bleed.   - add hydral 25mg q8.     Vitals:    01/30/25 0100 01/30/25 0200 01/30/25 0300 01/30/25 0400   BP: 139/71 (!) 140/71 130/64 136/67    01/30/25 0500 01/30/25 0600 01/30/25 0619 01/30/25 0701   BP: 117/62 138/65 138/64 138/65    01/30/25 0937 01/30/25 1106   BP: 133/64 129/69        Endocrine  Type 2 diabetes mellitus  Last A1c reviewed-   Lab Results   Component Value Date    LABA1C 10.7 (H) 06/27/2018    HGBA1C 10.1 (H) 01/24/2025     Inpatient Antihyperglycemic Regiment:  Antihyperglycemics (From admission, onward)      Start     Stop Route Frequency Ordered    01/29/25 2100  insulin glargine U-100 (Lantus) pen 27 Units         -- SubQ Nightly 01/29/25 1720    01/29/25 1819  " "insulin aspart U-100 pen 0-10 Units         -- SubQ Before meals & nightly PRN 01/29/25 1719          Blood Sugars (AccuCheck):    Recent Labs     01/28/25  2346 01/29/25  0548 01/29/25  1129 01/29/25  1709 01/29/25  2139 01/30/25  0702   POCTGLUCOSE 189* 165* 277* 282* 283* 171*         Obesity  Body mass index is 35.63 kg/m². Morbid obesity complicates all aspects of disease management from diagnostic modalities to treatment. Weight loss encouraged and health benefits explained to patient.     GI  * Retroperitoneal hematoma  65 y/o M with a medical hx of DM, hip fracture, GERD, HTN, HLD, and paroxysmal Afib who presented to [facility] as a transfer from [facility]. Pt originally presented to [facility] for R flank pain. He states he recently seen at [facility] for a kidney stone and thought pain was related to that.      CTA Abdomen showed: Large right perinephric and midline retroperitoneal hematomas with active contrast extravasation. Several areas of cortical hypoattenuation of the right kidney, suspicious for low-grade renal lacerations or infarcts.     IR consulted for emergent embolization. Concern for rupture of R gonadal artery, with concerns patient may lose R testicle. Admitted to MICU for concerns of hemorrhagic shock. HDS upon admission. Hgb stable at 10, baseline around 14. Now s/p IR embolization.  Urology consulted, Embolization of gonadal unlikely to cause testicular atrophy due collateral blood supply. Repeat CTA on 11/25 did not show active hemorrhage but redemonstrated large right renal subcapsular hematoma with mass effect suggesting page kidney.  IR consulted for possible drainage of hematoma.  Per IR: "Given dense appearance of hematoma on CT, a percutaneous drain is unlikely to decompress the hematoma. However, recommend right renal ultrasound to evaluate for any lower density regions of the hematoma that may be amendable to drainage."     Plan:  - Renal US : Large right " subcapsular hematoma. No hydronephrosis. Elevated resistive index possibly secondary to compression of renal parenchyma.   - Will trend hemoglobin and monitor for hemodynamic instability.  - Transfuse PRN  - holding AC meds; plan to resume if hemoglobin is stable for more than 24 hours  - work up for aquired coagulapathy.     Abdominal distention  Complaints of abdominal distention and mild tenderness since 1/24.  Reports having normal bowel movements every 3-4 days.  Patient reports being able to pass gas.  CTA AP from 01/25 showed decompressed stomach with perigastric soft tissue stranding posteriorly.  No large bowel obstruction noted.  Moderate volume stool throughout the colon.    - start MiraLax b.i.d. and senna/docusate       Critical Care Daily Checklist:    A: Awake: RASS Goal/Actual Goal:    Actual:     B: Spontaneous Breathing Trial Performed?     C: SAT & SBT Coordinated?  none                      D: Delirium: CAM-ICU     E: Early Mobility Performed? Yes   F: Feeding Goal: Goals: Meet % een/epn by next RD f/u  Status: Nutrition Goal Status: new   Current Diet Order   Procedures    Diet Adult Regular Consistent Carbohydrate     Order Specific Question:   Additional Diet Options:     Answer:   Consistent Carbohydrate      AS: Analgesia/Sedation Suboxone qid   T: Thromboembolic Prophylaxis none   H: HOB > 300 Yes   U: Stress Ulcer Prophylaxis (if needed) none   G: Glucose Control Glargine 27 units   B: Bowel Function Stool Occurrence: 1   I: Indwelling Catheter (Lines & Hood) Necessity none   D: De-escalation of Antimicrobials/Pharmacotherapies none    Plan for the day/ETD stepdown    Code Status:  Family/Goals of Care: Full Code         Critical care was time spent personally by me on the following activities: development of treatment plan with patient or surrogate and bedside caregivers, discussions with consultants, evaluation of patient's response to treatment, examination of patient, ordering  and performing treatments and interventions, ordering and review of laboratory studies, ordering and review of radiographic studies, pulse oximetry, re-evaluation of patient's condition. This critical care time did not overlap with that of any other provider or involve time for any procedures.     Javier Ortiz MD  Critical Care Medicine  [facility]

## 2025-01-30 NOTE — PT/OT/SLP EVAL
Physical Therapy Co-Evaluation and Discharge Note    Patient Name:  Joe Morgan   MRN:  [id number]    Recommendations:     Discharge Recommendations: No Therapy Indicated  Discharge Equipment Recommendations: none   Barriers to discharge: None    Assessment:     Joe Morgan is a 64 y.o. male admitted with a medical diagnosis of Retroperitoneal hematoma. At this time, patient is functioning at their prior level of function and does not require further acute PT services.     Recent Surgery: Procedure(s) (LRB):  ANGIOGRAM, RENAL, BILATERAL, SELECTIVE, WITH S&I (Bilateral) 7 Days Post-Op    Plan:     During this hospitalization, patient does not require further acute PT services.  Please re-consult if situation changes.      Subjective     Chief Complaint: none stated  Patient/Family Comments/goals: go home  Pain/Comfort:  Pain Rating 1: 0/10  Pain Rating Post-Intervention 1: 0/10    Patients cultural, spiritual, Restorationism conflicts given the current situation: no    Living Environment:  Pt lives with spouse neftaly 1st floor apt with no LEOLA.   Prior to admission, patients level of function was Independent, driving and working at [employer].  Equipment used at home: none.  DME owned (not currently used): none.  Upon discharge, patient will have assistance from spouse.    Objective:     Communicated with RN prior to session.  Patient found up in chair with telemetry, blood pressure cuff, pulse ox (continuous) upon PT entry to room.    General Precautions: Standard, fall    Orthopedic Precautions:N/A   Braces: N/A  Respiratory Status: Room air    Exams:  Cognitive Exam:  Patient is oriented to Person, Place, Time, and Situation  Gross Motor Coordination:  WFL  Postural Exam:  Patient presented with the following abnormalities:    -       Rounded shoulders  -       Forward head  -       Kyphosis  Sensation:    -       Intact  Skin Integrity/Edema:      -       Skin integrity: Visible skin intact  -       Edema: None  noted    RLE ROM: WFL  RLE Strength: WFL  LLE ROM: WFL  LLE Strength: WFL    Functional Mobility:  Bed Mobility:  Pt found/returned to bedside chair  Transfers:     Sit to Stand:  independence with no AD  Gait: 300' no AD Independent  All lines intact, portable monitor utilized, RN present  Pt with fwd flexed posture but mostly steady with no LOB.   Balance:   Sitting: Indepen    AM-PAC 6 CLICK MOBILITY  Total Score:24       Treatment and Education:  Pt educated on PT role and POC  Pt educated on pt d/c  Pt educated on importance of progressive mobility while admitted    AM-PAC 6 CLICK MOBILITY  Total Score:24     Patient left up in chair with all lines intact, call button in reach, and RN present.    GOALS:   Multidisciplinary Problems       Physical Therapy Goals       Not on file                    History:     Past Medical History:   Diagnosis Date    Diabetes mellitus     GERD (gastroesophageal reflux disease)     Hip discomfort     Hip fracture     Hyperlipidemia     Hypertension     Pancreas disorder     Spine pain        Past Surgical History:   Procedure Laterality Date    ANGIOGRAM, RENAL, BILATERAL, SELECTIVE, WITH S&I Bilateral 1/23/2025    Procedure: ANGIOGRAM, RENAL, BILATERAL, SELECTIVE, WITH S&I;  Surgeon: Celeste Win;  Location: [facility];  Service: Anesthesiology;  Laterality: Bilateral;    FRACTURE SURGERY         Time Tracking:     PT Received On: 01/30/25  PT Start Time: 0856     PT Stop Time: 0904  PT Total Time (min): 8 min     Billable Minutes: Evaluation 8 minutes      01/30/2025

## 2025-01-30 NOTE — NURSING
Pt AAO x 4, c/o right flank pain and LUQ tenderness. Pain medicine given as ordered. VSS, NSR on telemetry. Glucose monitored. 1st dose of Lovenox given. Pt independent with ADLs. Urine dark yellow in urinal. No acute events this shift. Safety measures in place. Call light in reach.

## 2025-01-30 NOTE — PT/OT/SLP EVAL
Occupational Therapy  Co Evaluation and Discharge     Name: Joe Morgan  MRN: [id number]  Admitting Diagnosis: Retroperitoneal hematoma  Recent Surgery: Procedure(s) (LRB):  ANGIOGRAM, RENAL, BILATERAL, SELECTIVE, WITH S&I (Bilateral) 7 Days Post-Op    Recommendations:     Discharge Recommendations: No Therapy Indicated      Assessment:     Joe Morgan is a 64 y.o. male with a medical diagnosis of Retroperitoneal hematoma. Pt tolerated session well with good effort and performance. Pt does not demo need for skilled OT at this time and OT will d/c services.     Plan:   D/C OT 1/30/2025     Subjective   Pt agreeable to therapy.     Occupational Profile:  Pt resides with spouse in one story home with no LEOLA. Pt was independent prior to arrival and has no DME. He works and drives. Spouse can assist on d/c has needed     Pain/Comfort:  Pain Rating 1: 0/10    Patients cultural, spiritual, Latter-day conflicts given the current situation: no    Objective:     Communicated with: nsg  prior to session.  Patient found in chair eating breakfast. Pt with tele intact.   Coeval completed this date to optimize functional performance and safety given anticipated  impaired tolerance for activity in setting of ICU  General Precautions: Standard,  fall    Occupational Performance:    Functional Mobility/Transfers:  Sit>stand with independence from chair and commode.     Activities of Daily Living:  Feeding: Independent  G/H standing independent.   LE dressing: Donned jeans independently  Toileting: independent       Cognitive/Visual Perceptual:  Pt awake alert and following commands.     Physical Exam:  Pt is right hand dominant and demo WFL UE strength/ROM, coordination and sensation   AMPAC 6 Click ADL:  AMPAC Total Score: 24    Treatment & Education:  Pt completed functional mobility in room independently. Pt demo adequate strength, endurance and balance to complete basic ADL skills and mobility without assist.    Education provided re: role of OT/POC including d/c of OT service and safety with functional mobility and ADl skills.     Patient left up in chair with all lines intact, call button in reach, and nsg notified    GOALS:   Multidisciplinary Problems       Occupational Therapy Goals       Not on file                  History:     Past Medical History:   Diagnosis Date    Diabetes mellitus     GERD (gastroesophageal reflux disease)     Hip discomfort     Hip fracture     Hyperlipidemia     Hypertension     Pancreas disorder     Spine pain          Past Surgical History:   Procedure Laterality Date    ANGIOGRAM, RENAL, BILATERAL, SELECTIVE, WITH S&I Bilateral 1/23/2025    Procedure: ANGIOGRAM, RENAL, BILATERAL, SELECTIVE, WITH S&I;  Surgeon: Celeste Win;  Location: [facility];  Service: Anesthesiology;  Laterality: Bilateral;    FRACTURE SURGERY         Time Tracking:     OT Date of Treatment: 01/30/25  OT Start Time: 0856  OT Stop Time: 0904  OT Total Time (min): 8 min    Billable Minutes:Evaluation 8    1/30/2025

## 2025-01-30 NOTE — ASSESSMENT & PLAN NOTE
"Patient's hemorrhage is due to  unclear reason , patient does have a propensity for bleeding due to a medication, the medication is DOAC.. Patients most recent Hgb, Hct, platelets, and INR are listed below.      Initially in hospitalization -IR consulted for emergent embolization. 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 Admitted to MICU for concerns of hemorrhagic shock.  Hgb was at 10, baseline around 14. Now s/p IR embolization.  Urology consulted, Embolization of gonadal unlikely to cause testicular atrophy due collateral blood supply. Repeat CTA on 1/25 did not show active hemorrhage but redemonstrated large right renal subcapsular hematoma with mass effect suggesting page kidney.  IR consulted for possible drainage of hematoma.  Per IR: "Given dense appearance of hematoma on CT, a percutaneous drain is unlikely to decompress the hematoma. However, recommend right renal ultrasound to evaluate for any lower density regions of the hematoma that may be amendable to drainage."       Recent Labs     01/28/25  0312 01/29/25  0342 01/30/25  0326   HGB 8.7* 9.3* 8.7*   HCT 26.6* 28.8* 27.7*    475* 486*     Plan  - Will trend hemoglobin/hematocrit Daily  - Will monitor and correct any coagulation defects  - Will transfuse if Hgb is <7g/dl (<8g/dl in cases of active ACS) or if patient has rapid bleeding leading to hemodynamic instability  - Lovenox ppx started 1/30  - [id number] VASc score 2 - Discussed with cardiology about need to continue AC and start in house - recommend starting prior to discharge  - Plan to start AC in am if h/h stable and no concerns for active bleeding  -Found to have a RPH with active extravasation on CTA. IR embolized his R testicular artery. Patient was noted to have a RPH on repeat ultrasound but IR signed off and does not think he is a candidate for drain placement at this time.   - Renal US : Large right subcapsular hematoma. No hydronephrosis. Elevated resistive index possibly " secondary to compression of renal parenchyma.   -repeat CTA 1/24/25 showed no active bleed.   -Seen by urology - placed outpatient referral to Urology to establish care in [place].   - work up for aquired coagulapathy- Factor 8 activity, fibrinogen elevated, PT INR normal on last check

## 2025-01-30 NOTE — PLAN OF CARE
Recommendations  Continue carb controlled diet  Monitor labs, meds, skin    Goals: Meet % een/epn by next RD f/u  Nutrition Goal Status: new  Communication of RD Recs: other (comment) (poc)

## 2025-01-30 NOTE — ASSESSMENT & PLAN NOTE
Complaints of abdominal distention and mild tenderness since 1/24.  Reports having normal bowel movements every 3-4 days.  Patient reports being able to pass gas.  CTA AP from 01/25 showed decompressed stomach with perigastric soft tissue stranding posteriorly.  No large bowel obstruction noted.  Moderate volume stool throughout the colon.     - c/w  MiraLax b.i.d. and senna/docusate  - Oxybutynin for bladder spasms

## 2025-01-30 NOTE — HOSPITAL COURSE
64 y.o. male w/ a PMH of DM, hip fracture, and paroxysmal afb presented originally with R flank pain. Found to have a RPH with active extravasation on CTA. IR embolized his R testicular artery at that time. Patient was noted to have a RPH on repeat ultrasound but IR has signed off and does not think he is a candidate for drain placement at this time. Patient received 1u of blood and repeat CBCs have been stable. Started on Lovenox ppx 1/30.       Pain is controlled with increased dose of suboxone. Patient to follow up with his PCP/pain management for ongoing treatment    Patient to follow up with cardiology and urology for continued care  - patient in agreement with holding AC for A fib until follow up with cardiology, urology and pcp     Care plan reviewed with patient, he is in agreement with plan. ED return precautions given. All questions answered, patient discharged in stable condition      PE  No acute distress  Heart rrr  Lungs cta  R groin cath site clean and dry, well healing

## 2025-01-30 NOTE — ASSESSMENT & PLAN NOTE
Patient's FSGs are uncontrolled due to hyperglycemia on current medication regimen.  Last A1c reviewed-   Lab Results   Component Value Date    LABA1C 10.7 (H) 06/27/2018    HGBA1C 10.1 (H) 01/24/2025     Most recent fingerstick glucose reviewed-   Recent Labs   Lab 01/29/25  1709 01/29/25  2139 01/30/25  0702   POCTGLUCOSE 282* 283* 171*     Current correctional scale  Medium  Maintain anti-hyperglycemic dose as follows-   Antihyperglycemics (From admission, onward)      Start     Stop Route Frequency Ordered    01/30/25 2100  insulin glargine U-100 (Lantus) pen 30 Units         -- SubQ Nightly 01/30/25 1315    01/29/25 1819  insulin aspart U-100 pen 0-10 Units         -- SubQ Before meals & nightly PRN 01/29/25 1719          Hold Oral hypoglycemics while patient is in the hospital.

## 2025-01-30 NOTE — PROGRESS NOTES
[facility] Medicine  Progress Note    Patient Name: Joe Morgan  MRN: [id number]  Patient Class: IP- Inpatient   Admission Date: 1/23/2025  Length of Stay: 7 days  Attending Physician: Augustin Oliva MD  Primary Care Provider: Johnnie Gibbs MD        Subjective     Principal Problem:Retroperitoneal hematoma        HPI:  No notes on file    Overview/Hospital Course:  64 y.o. male w/ a PMH of DM, hip fracture, and paroxysmal afb presented originally with R flank pain. Found to have a RPH with active extravasation on CTA. IR embolized his R testicular artery at that time. Patient was noted to have a RPH on repeat ultrasound but IR has signed off and does not think he is a candidate for drain placement at this time. Patient received 1u of blood and repeat CBCs have been stable. Started on Lovenox ppx 1/30.     Interval History:     Seen and examined at bedside. Complaining of persistent right flank pain but reports its a little better 3/10. Recently had painful bladder spasms when urination is ceased.      Enoxaparin ordered by ICU to start today at 1700.     Review of Systems   Genitourinary:  Positive for flank pain.   All other systems reviewed and are negative.    Objective:     Vital Signs (Most Recent):  Temp: 98.4 °F (36.9 °C) (01/30/25 1142)  Pulse: 72 (01/30/25 1106)  Resp: 12 (01/30/25 1106)  BP: 129/69 (01/30/25 1106)  SpO2: 100 % (01/30/25 1106) Vital Signs (24h Range):  Temp:  [96.1 °F (35.6 °C)-98.8 °F (37.1 °C)] 98.4 °F (36.9 °C)  Pulse:  [59-88] 72  Resp:  [12-20] 12  SpO2:  [93 %-100 %] 100 %  BP: (117-170)/(62-94) 129/69   Weight: 112.6 kg (248 lb 4.8 oz)  Body mass index is 35.63 kg/m².      Intake/Output Summary (Last 24 hours) at 1/30/2025 1240  Last data filed at 1/30/2025 0701  Gross per 24 hour   Intake 870 ml   Output 1300 ml   Net -430 ml          Physical Exam  Vitals and nursing note reviewed.   Constitutional:       Appearance: He is well-developed.    HENT:      Head: Normocephalic.      Mouth/Throat:      Mouth: Mucous membranes are moist.   Eyes:      Pupils: Pupils are equal, round, and reactive to light.   Cardiovascular:      Rate and Rhythm: Normal rate and regular rhythm.      Pulses: Normal pulses.   Pulmonary:      Effort: Pulmonary effort is normal.   Abdominal:      General: Bowel sounds are normal. There is no distension.      Palpations: Abdomen is soft.      Tenderness: There is no abdominal tenderness. There is no guarding or rebound. R flank pain      Hernia: No hernia is present.   Musculoskeletal:         General: No swelling.   Skin:     General: Skin is warm and dry.   Neurological:      Mental Status: He is alert.   Psychiatric:         Mood and Affect: Mood normal.         Behavior: Behavior normal.         Thought Content: Thought content normal.         Judgment: Judgment normal.        Vents:     Lines/Drains/Airways       Peripheral Intravenous Line  Duration                  Peripheral IV - Single Lumen 01/23/25 2110 20 G Anterior;Distal;Left Forearm 6 days         Peripheral IV - Single Lumen 01/26/25 1726 18 G 1 in Anterior;Right Upper Arm 3 days         Peripheral IV - Single Lumen 01/27/25 1209 20 G 1 3/4 in Anterior;Left;Proximal Forearm 3 days                  Significant Labs:    CBC/Anemia Profile:  Recent Labs   Lab 01/29/25  0342 01/30/25  0326   WBC 9.62 8.65   HGB 9.3* 8.7*   HCT 28.8* 27.7*   * 486*   MCV 88 88   RDW 15.9* 15.9*        Chemistries:  Recent Labs   Lab 01/29/25  0342 01/30/25  0326   * 131*   K 4.4 4.1    103   CO2 23 21*   BUN 23 25*   CREATININE 0.9 0.8   CALCIUM 8.9 8.6*   ALBUMIN 2.2* 2.1*   PROT 7.7 7.3   BILITOT 0.6 0.4   ALKPHOS 89 79   ALT 22 20   AST 25 16   MG 2.0 2.1   PHOS 3.7 3.2       All pertinent labs within the past 24 hours have been reviewed.    Significant Imaging:  I have reviewed all pertinent imaging results/findings within the past 24 hours.      Assessment and  "Plan     * Retroperitoneal hematoma  Patient's hemorrhage is due to  unclear reason , patient does have a propensity for bleeding due to a medication, the medication is DOAC.. Patients most recent Hgb, Hct, platelets, and INR are listed below.      Initially in hospitalization -IR consulted for emergent embolization. 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 Admitted to MICU for concerns of hemorrhagic shock.  Hgb was at 10, baseline around 14. Now s/p IR embolization.  Urology consulted, Embolization of gonadal unlikely to cause testicular atrophy due collateral blood supply. Repeat CTA on 1/25 did not show active hemorrhage but redemonstrated large right renal subcapsular hematoma with mass effect suggesting page kidney.  IR consulted for possible drainage of hematoma.  Per IR: "Given dense appearance of hematoma on CT, a percutaneous drain is unlikely to decompress the hematoma. However, recommend right renal ultrasound to evaluate for any lower density regions of the hematoma that may be amendable to drainage."       Recent Labs     01/28/25  0312 01/29/25  0342 01/30/25  0326   HGB 8.7* 9.3* 8.7*   HCT 26.6* 28.8* 27.7*    475* 486*     Plan  - Will trend hemoglobin/hematocrit Daily  - Will monitor and correct any coagulation defects  - Will transfuse if Hgb is <7g/dl (<8g/dl in cases of active ACS) or if patient has rapid bleeding leading to hemodynamic instability  - Lovenox ppx started 1/30  - [id number] VASc score 2 - Discussed with cardiology about need to continue AC and start in house - recommend starting prior to discharge  - Plan to start AC in am if h/h stable and no concerns for active bleeding  -Found to have a RPH with active extravasation on CTA. IR embolized his R testicular artery. Patient was noted to have a RPH on repeat ultrasound but IR signed off and does not think he is a candidate for drain placement at this time.   - Renal US : Large right subcapsular hematoma. No hydronephrosis. " Elevated resistive index possibly secondary to compression of renal parenchyma.   -repeat CTA 1/24/25 showed no active bleed.   -Seen by urology - placed outpatient referral to Urology to establish care in [place].   - work up for aquired coagulapathy- Factor 8 activity, fibrinogen elevated, PT INR normal on last check     Acute blood loss anemia  Anemia is likely due to acute blood loss which was from RP bleed . Most recent hemoglobin and hematocrit are listed below.    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  - Patient has received 3 units of PRBCs on 1/24 and 1/26  - Patient's anemia is currently stable  - Monitor as started on dvt ppx  - Plan for DOAC initiation if stable    Bladder spasms  - continue oxybutynin      Abdominal distention  Complaints of abdominal distention and mild tenderness since 1/24.  Reports having normal bowel movements every 3-4 days.  Patient reports being able to pass gas.  CTA AP from 01/25 showed decompressed stomach with perigastric soft tissue stranding posteriorly.  No large bowel obstruction noted.  Moderate volume stool throughout the colon.     - c/w  MiraLax b.i.d. and senna/docusate  - Oxybutynin for bladder spasms      Paroxysmal atrial fibrillation  Patient has paroxysmal (<7 days) atrial fibrillation. Patient is currently in sinus rhythm. [id number] Score: 2 (DM and HTN). The patients heart rate in the last 24 hours is as follows:  Pulse  Min: 59  Max: 88     Antiarrhythmics  metoprolol succinate (TOPROL-XL) 24 hr tablet 25 mg, Daily, Oral    Anticoagulants  enoxaparin injection 40 mg, Every 24 hours, Subcutaneous    Plan  - Replete lytes with a goal of K>4, Mg >2  - Patient is not anticoagulated due to RP  bleed- holding home AC  - Patient's afib is currently controlled  - Starting trial of Lovenox ppx, if hb stable will start DOAC per discussion with cardiology and monitor for a day before discharge        Essential hypertension  Patient's blood pressure range in the last 24 hours was: BP  Min: 117/62  Max: 170/74.The patient's inpatient anti-hypertensive regimen is listed below:  Current Antihypertensives  metoprolol succinate (TOPROL-XL) 24 hr tablet 25 mg, Daily, Oral  amLODIPine tablet 10 mg, Daily, Oral  labetalol 20 mg/4 mL (5 mg/mL) IV syring, Every 4 hours PRN, Intravenous  lisinopriL tablet 40 mg, Daily, Oral  hydrALAZINE tablet 25 mg, Every 8 hours, Oral    Plan  - BP is controlled, no changes needed to their regimen  - Monitor and address as needed    Type 2 diabetes mellitus  Patient's FSGs are uncontrolled due to hyperglycemia on current medication regimen.  Last A1c reviewed-   Lab Results   Component Value Date    LABA1C 10.7 (H) 06/27/2018    HGBA1C 10.1 (H) 01/24/2025     Most recent fingerstick glucose reviewed-   Recent Labs   Lab 01/29/25  1709 01/29/25  2139 01/30/25  0702   POCTGLUCOSE 282* 283* 171*     Current correctional scale  Medium  Maintain anti-hyperglycemic dose as follows-   Antihyperglycemics (From admission, onward)      Start     Stop Route Frequency Ordered    01/30/25 2100  insulin glargine U-100 (Lantus) pen 30 Units         -- SubQ Nightly 01/30/25 1315    01/29/25 1819  insulin aspart U-100 pen 0-10 Units         -- SubQ Before meals & nightly PRN 01/29/25 1719          Hold Oral hypoglycemics while patient is in the hospital.    Obesity  Body mass index is 35.63 kg/m². Morbid obesity complicates all aspects of disease management from diagnostic modalities to treatment. Weight loss encouraged and health benefits explained to patient.           VTE Risk Mitigation (From admission, onward)           Ordered     enoxaparin injection 40 mg  Every 24 hours         01/30/25 4306      Reason for No Pharmacological VTE Prophylaxis  Once        Question:  Reasons:  Answer:  Active Bleeding    01/23/25 2338     IP VTE HIGH RISK PATIENT  Once         01/23/25 2318     Place sequential compression device  Until discontinued         01/23/25 2318                    Discharge Planning   JUSTA: 1/31/2025     Code Status: Full Code   Medical Readiness for Discharge Date:   Discharge Plan A: Home with family   Discharge Delays: None known at this time                    Augustin Oliva MD  Department of Hospital Medicine   [facility] - Acute Medical Stepdown

## 2025-01-30 NOTE — SUBJECTIVE & OBJECTIVE
Interval History/Significant Events: Patient complained of some right flank pain overnight and received a 0.5 mg IV dose of dilaudid.     Uptitrated suboxone to 1 film QID. Tolerated PT/OT.     Review of Systems   Constitutional: Negative.    All other systems reviewed and are negative.    Objective:     Vital Signs (Most Recent):  Temp: 98.4 °F (36.9 °C) (01/30/25 1142)  Pulse: 72 (01/30/25 1106)  Resp: 12 (01/30/25 1106)  BP: 129/69 (01/30/25 1106)  SpO2: 100 % (01/30/25 1106) Vital Signs (24h Range):  Temp:  [96.1 °F (35.6 °C)-98.8 °F (37.1 °C)] 98.4 °F (36.9 °C)  Pulse:  [59-88] 72  Resp:  [12-20] 12  SpO2:  [93 %-100 %] 100 %  BP: (117-170)/(62-94) 129/69   Weight: 112.6 kg (248 lb 4.8 oz)  Body mass index is 35.63 kg/m².      Intake/Output Summary (Last 24 hours) at 1/30/2025 1310  Last data filed at 1/30/2025 0701  Gross per 24 hour   Intake 870 ml   Output 1300 ml   Net -430 ml          Physical Exam  Vitals and nursing note reviewed.   Constitutional:       General: He is not in acute distress.     Appearance: Normal appearance. He is normal weight.   HENT:      Head: Normocephalic and atraumatic.      Mouth/Throat:      Mouth: Mucous membranes are moist.   Eyes:      Extraocular Movements: Extraocular movements intact.      Pupils: Pupils are equal, round, and reactive to light.   Cardiovascular:      Rate and Rhythm: Normal rate and regular rhythm.      Pulses: Normal pulses.      Heart sounds: Normal heart sounds.   Pulmonary:      Effort: Pulmonary effort is normal.      Breath sounds: Normal breath sounds.   Abdominal:      General: Abdomen is flat.   Musculoskeletal:         General: Normal range of motion.      Cervical back: Normal range of motion.   Skin:     General: Skin is warm.      Capillary Refill: Capillary refill takes less than 2 seconds.   Neurological:      General: No focal deficit present.      Mental Status: He is alert and oriented to person, place, and time.            Vents:      Lines/Drains/Airways       Peripheral Intravenous Line  Duration                  Peripheral IV - Single Lumen 01/23/25 2110 20 G Anterior;Distal;Left Forearm 6 days         Peripheral IV - Single Lumen 01/26/25 1726 18 G 1 in Anterior;Right Upper Arm 3 days         Peripheral IV - Single Lumen 01/27/25 1209 20 G 1 3/4 in Anterior;Left;Proximal Forearm 3 days                  Significant Labs:    CBC/Anemia Profile:  Recent Labs   Lab 01/29/25  0342 01/30/25  0326   WBC 9.62 8.65   HGB 9.3* 8.7*   HCT 28.8* 27.7*   * 486*   MCV 88 88   RDW 15.9* 15.9*        Chemistries:  Recent Labs   Lab 01/29/25  0342 01/30/25  0326   * 131*   K 4.4 4.1    103   CO2 23 21*   BUN 23 25*   CREATININE 0.9 0.8   CALCIUM 8.9 8.6*   ALBUMIN 2.2* 2.1*   PROT 7.7 7.3   BILITOT 0.6 0.4   ALKPHOS 89 79   ALT 22 20   AST 25 16   MG 2.0 2.1   PHOS 3.7 3.2         Significant Imaging:  I have reviewed all pertinent imaging results/findings within the past 24 hours.

## 2025-01-30 NOTE — PLAN OF CARE
Problem: Adult Inpatient Plan of Care  Goal: Plan of Care Review  Outcome: Progressing  Goal: Patient-Specific Goal (Individualized)  Outcome: Progressing  Goal: Absence of Hospital-Acquired Illness or Injury  Outcome: Progressing  Goal: Optimal Comfort and Wellbeing  Outcome: Progressing  Goal: Readiness for Transition of Care  Outcome: Progressing     Problem: Diabetes Comorbidity  Goal: Blood Glucose Level Within Targeted Range  Outcome: Progressing     Problem: Wound  Goal: Optimal Coping  Outcome: Progressing  Goal: Optimal Functional Ability  Outcome: Progressing  Goal: Absence of Infection Signs and Symptoms  Outcome: Progressing  Goal: Improved Oral Intake  Outcome: Progressing  Goal: Optimal Pain Control and Function  Outcome: Progressing  Goal: Skin Health and Integrity  Outcome: Progressing  Goal: Optimal Wound Healing  Outcome: Progressing     Problem: Acute Kidney Injury/Impairment  Goal: Fluid and Electrolyte Balance  Outcome: Progressing  Goal: Improved Oral Intake  Outcome: Progressing  Goal: Effective Renal Function  Outcome: Progressing     Problem: Skin Injury Risk Increased  Goal: Skin Health and Integrity  Outcome: Progressing     Problem: Fall Injury Risk  Goal: Absence of Fall and Fall-Related Injury  Outcome: Progressing

## 2025-01-30 NOTE — PLAN OF CARE
MICU DAILY GOALS     Family/Goals of care/Code Status   Code Status: Full Code    24H Vital Sign Range  Temp:  [97.8 °F (36.6 °C)-98.8 °F (37.1 °C)]   Pulse:  [59-88]   Resp:  [10-20]   BP: (117-170)/(62-94)   SpO2:  [93 %-99 %]      Shift Events (include procedures and significant events)   No acute events throughout shift    AWAKE RASS: Goal -    Actual - RASS (Paz Agitation-Sedation Scale): alert and calm    Restraint necessity: Not necessary   BREATHE SBT: Not intubated    Coordinate A & B, analgesics/sedatives Pain: managed   SAT: Not intubated   Delirium CAM-ICU:     Early(intubated/ Progressive (non-intubated) Mobility MOVE Screen (INTUBATED ONLY): Not intubated    Activity: Activity Management: Rolling - L1   Feeding/Nutrition Diet order: Diet/Nutrition Received: consistent carb/diabetic diet,     Thrombus DVT prophylaxis: VTE Core Measure: (SCDs) Sequential compression device initiated/maintained   HOB Elevation Head of Bed (HOB) Positioning: HOB elevated   Ulcer Prophylaxis GI: yes   Glucose control managed Glycemic Management: blood glucose monitored   Skin Skin assessment:     Sacrum intact/not altered? Yes  Heels intact/not altered? Yes  Surgical wound? Yes    CHECK ONE!   (no altered skin or altered skin) and sub boxes:  [] No Altered Skin Integrity Present    []Prevention Measures Documented    [x] Altered Skin Integrity Present or Discovered   [] LDA present in EPIC, daily doc completed              [] LDA added if not in EPIC (describe wound).                    When describing wound, do not stage, use descriptive words only.    [] Wound Image Taken (required on admit,                   transfer/discharge and every Tuesday)    Wound Care Consulted? No   Bowel Function no issues    Indwelling Catheter Necessity            De-escalation Antibiotics No        VS and assessment per flow sheet, patient progressing towards goals as tolerated, plan of care reviewed with  pt , all concerns  addressed.

## 2025-01-30 NOTE — ASSESSMENT & PLAN NOTE
Patient's blood pressure range in the last 24 hours was: BP  Min: 117/62  Max: 170/74.The patient's inpatient anti-hypertensive regimen is listed below:  Current Antihypertensives  metoprolol succinate (TOPROL-XL) 24 hr tablet 25 mg, Daily, Oral  amLODIPine tablet 10 mg, Daily, Oral  labetalol 20 mg/4 mL (5 mg/mL) IV syring, Every 4 hours PRN, Intravenous  lisinopriL tablet 40 mg, Daily, Oral  hydrALAZINE tablet 25 mg, Every 8 hours, Oral    Plan  - BP is controlled, no changes needed to their regimen  - Monitor and address as needed

## 2025-01-30 NOTE — ASSESSMENT & PLAN NOTE
Takes lisinopril 40 mg and metoprolol succinate 25 mg as home medications. HDS upon admission and s/p embolization.     - restart lisinopril  - add amlodipine for tighter bp control in the setting of RP bleed.   - add hydral 25mg q8.     Vitals:    01/30/25 0100 01/30/25 0200 01/30/25 0300 01/30/25 0400   BP: 139/71 (!) 140/71 130/64 136/67    01/30/25 0500 01/30/25 0600 01/30/25 0619 01/30/25 0701   BP: 117/62 138/65 138/64 138/65    01/30/25 0937 01/30/25 1106   BP: 133/64 129/69

## 2025-01-31 ENCOUNTER — TELEPHONE (OUTPATIENT)
Dept: ELECTROPHYSIOLOGY | Facility: CLINIC | Age: 65
End: 2025-01-31
Payer: COMMERCIAL

## 2025-01-31 PROBLEM — Z79.01 ON CONTINUOUS ORAL ANTICOAGULATION: Status: ACTIVE | Noted: 2025-01-31

## 2025-01-31 LAB
ALBUMIN SERPL BCP-MCNC: 2.5 G/DL (ref 3.5–5.2)
ALP SERPL-CCNC: 95 U/L (ref 40–150)
ALT SERPL W/O P-5'-P-CCNC: 22 U/L (ref 10–44)
ANION GAP SERPL CALC-SCNC: 10 MMOL/L (ref 8–16)
AST SERPL-CCNC: 24 U/L (ref 10–40)
BASOPHILS # BLD AUTO: 0.05 K/UL (ref 0–0.2)
BASOPHILS # BLD AUTO: 0.05 K/UL (ref 0–0.2)
BASOPHILS NFR BLD: 0.6 % (ref 0–1.9)
BASOPHILS NFR BLD: 0.7 % (ref 0–1.9)
BILIRUB SERPL-MCNC: 0.5 MG/DL (ref 0.1–1)
BUN SERPL-MCNC: 34 MG/DL (ref 8–23)
CALCIUM SERPL-MCNC: 9.5 MG/DL (ref 8.7–10.5)
CHLORIDE SERPL-SCNC: 102 MMOL/L (ref 95–110)
CO2 SERPL-SCNC: 21 MMOL/L (ref 23–29)
CREAT SERPL-MCNC: 1 MG/DL (ref 0.5–1.4)
DIFFERENTIAL METHOD BLD: ABNORMAL
DIFFERENTIAL METHOD BLD: ABNORMAL
EOSINOPHIL # BLD AUTO: 0.1 K/UL (ref 0–0.5)
EOSINOPHIL # BLD AUTO: 0.1 K/UL (ref 0–0.5)
EOSINOPHIL NFR BLD: 1.3 % (ref 0–8)
EOSINOPHIL NFR BLD: 1.8 % (ref 0–8)
ERYTHROCYTE [DISTWIDTH] IN BLOOD BY AUTOMATED COUNT: 15.5 % (ref 11.5–14.5)
ERYTHROCYTE [DISTWIDTH] IN BLOOD BY AUTOMATED COUNT: 15.5 % (ref 11.5–14.5)
EST. GFR  (NO RACE VARIABLE): >60 ML/MIN/1.73 M^2
GLUCOSE SERPL-MCNC: 204 MG/DL (ref 70–110)
HCT VFR BLD AUTO: 28.1 % (ref 40–54)
HCT VFR BLD AUTO: 28.5 % (ref 40–54)
HGB BLD-MCNC: 8.8 G/DL (ref 14–18)
HGB BLD-MCNC: 8.8 G/DL (ref 14–18)
IMM GRANULOCYTES # BLD AUTO: 0.07 K/UL (ref 0–0.04)
IMM GRANULOCYTES # BLD AUTO: 0.08 K/UL (ref 0–0.04)
IMM GRANULOCYTES NFR BLD AUTO: 1 % (ref 0–0.5)
IMM GRANULOCYTES NFR BLD AUTO: 1 % (ref 0–0.5)
LYMPHOCYTES # BLD AUTO: 0.8 K/UL (ref 1–4.8)
LYMPHOCYTES # BLD AUTO: 0.8 K/UL (ref 1–4.8)
LYMPHOCYTES NFR BLD: 10.6 % (ref 18–48)
LYMPHOCYTES NFR BLD: 10.7 % (ref 18–48)
MAGNESIUM SERPL-MCNC: 2.4 MG/DL (ref 1.6–2.6)
MCH RBC QN AUTO: 27.4 PG (ref 27–31)
MCH RBC QN AUTO: 27.8 PG (ref 27–31)
MCHC RBC AUTO-ENTMCNC: 30.9 G/DL (ref 32–36)
MCHC RBC AUTO-ENTMCNC: 31.3 G/DL (ref 32–36)
MCV RBC AUTO: 89 FL (ref 82–98)
MCV RBC AUTO: 89 FL (ref 82–98)
MONOCYTES # BLD AUTO: 0.7 K/UL (ref 0.3–1)
MONOCYTES # BLD AUTO: 0.8 K/UL (ref 0.3–1)
MONOCYTES NFR BLD: 10.4 % (ref 4–15)
MONOCYTES NFR BLD: 9.8 % (ref 4–15)
NEUTROPHILS # BLD AUTO: 5.6 K/UL (ref 1.8–7.7)
NEUTROPHILS # BLD AUTO: 6 K/UL (ref 1.8–7.7)
NEUTROPHILS NFR BLD: 76 % (ref 38–73)
NEUTROPHILS NFR BLD: 76.1 % (ref 38–73)
NRBC BLD-RTO: 0 /100 WBC
NRBC BLD-RTO: 0 /100 WBC
PHOSPHATE SERPL-MCNC: 3.6 MG/DL (ref 2.7–4.5)
PLATELET # BLD AUTO: 539 K/UL (ref 150–450)
PLATELET # BLD AUTO: 545 K/UL (ref 150–450)
PMV BLD AUTO: 8.7 FL (ref 9.2–12.9)
PMV BLD AUTO: 8.7 FL (ref 9.2–12.9)
POCT GLUCOSE: 140 MG/DL (ref 70–110)
POCT GLUCOSE: 160 MG/DL (ref 70–110)
POCT GLUCOSE: 211 MG/DL (ref 70–110)
POCT GLUCOSE: 315 MG/DL (ref 70–110)
POTASSIUM SERPL-SCNC: 4.4 MMOL/L (ref 3.5–5.1)
PROT SERPL-MCNC: 8.6 G/DL (ref 6–8.4)
RBC # BLD AUTO: 3.17 M/UL (ref 4.6–6.2)
RBC # BLD AUTO: 3.21 M/UL (ref 4.6–6.2)
SODIUM SERPL-SCNC: 133 MMOL/L (ref 136–145)
WBC # BLD AUTO: 7.33 K/UL (ref 3.9–12.7)
WBC # BLD AUTO: 7.86 K/UL (ref 3.9–12.7)

## 2025-01-31 PROCEDURE — 99222 1ST HOSP IP/OBS MODERATE 55: CPT | Mod: ,,, | Performed by: STUDENT IN AN ORGANIZED HEALTH CARE EDUCATION/TRAINING PROGRAM

## 2025-01-31 PROCEDURE — 36415 COLL VENOUS BLD VENIPUNCTURE: CPT

## 2025-01-31 PROCEDURE — 25000003 PHARM REV CODE 250: Performed by: INTERNAL MEDICINE

## 2025-01-31 PROCEDURE — 25000003 PHARM REV CODE 250

## 2025-01-31 PROCEDURE — 63600175 PHARM REV CODE 636 W HCPCS: Performed by: STUDENT IN AN ORGANIZED HEALTH CARE EDUCATION/TRAINING PROGRAM

## 2025-01-31 PROCEDURE — 80053 COMPREHEN METABOLIC PANEL: CPT

## 2025-01-31 PROCEDURE — 83735 ASSAY OF MAGNESIUM: CPT

## 2025-01-31 PROCEDURE — 20600001 HC STEP DOWN PRIVATE ROOM

## 2025-01-31 PROCEDURE — 36415 COLL VENOUS BLD VENIPUNCTURE: CPT | Performed by: STUDENT IN AN ORGANIZED HEALTH CARE EDUCATION/TRAINING PROGRAM

## 2025-01-31 PROCEDURE — 85025 COMPLETE CBC W/AUTO DIFF WBC: CPT | Mod: 91 | Performed by: STUDENT IN AN ORGANIZED HEALTH CARE EDUCATION/TRAINING PROGRAM

## 2025-01-31 PROCEDURE — 84100 ASSAY OF PHOSPHORUS: CPT

## 2025-01-31 PROCEDURE — 63600175 PHARM REV CODE 636 W HCPCS

## 2025-01-31 RX ORDER — BUPRENORPHINE AND NALOXONE 8; 2 MG/1; MG/1
1 FILM, SOLUBLE BUCCAL; SUBLINGUAL EVERY 6 HOURS
Status: DISCONTINUED | OUTPATIENT
Start: 2025-01-31 | End: 2025-02-01 | Stop reason: HOSPADM

## 2025-01-31 RX ADMIN — BUPRENORPHINE AND NALOXONE 1 FILM: 8; 2 FILM BUCCAL; SUBLINGUAL at 08:01

## 2025-01-31 RX ADMIN — OXYBUTYNIN CHLORIDE 5 MG: 5 TABLET, EXTENDED RELEASE ORAL at 08:01

## 2025-01-31 RX ADMIN — LISINOPRIL 40 MG: 20 TABLET ORAL at 08:01

## 2025-01-31 RX ADMIN — INSULIN ASPART 4 UNITS: 100 INJECTION, SOLUTION INTRAVENOUS; SUBCUTANEOUS at 09:01

## 2025-01-31 RX ADMIN — HYDRALAZINE HYDROCHLORIDE 25 MG: 25 TABLET ORAL at 09:01

## 2025-01-31 RX ADMIN — ENOXAPARIN SODIUM 40 MG: 40 INJECTION SUBCUTANEOUS at 07:01

## 2025-01-31 RX ADMIN — INSULIN GLARGINE 30 UNITS: 100 INJECTION, SOLUTION SUBCUTANEOUS at 09:01

## 2025-01-31 RX ADMIN — METHOCARBAMOL 500 MG: 500 TABLET ORAL at 05:01

## 2025-01-31 RX ADMIN — METHOCARBAMOL 500 MG: 500 TABLET ORAL at 06:01

## 2025-01-31 RX ADMIN — ACETAMINOPHEN 1000 MG: 500 TABLET ORAL at 05:01

## 2025-01-31 RX ADMIN — AMLODIPINE BESYLATE 10 MG: 10 TABLET ORAL at 08:01

## 2025-01-31 RX ADMIN — BUPRENORPHINE AND NALOXONE 1 FILM: 8; 2 FILM BUCCAL; SUBLINGUAL at 12:01

## 2025-01-31 RX ADMIN — HYDRALAZINE HYDROCHLORIDE 25 MG: 25 TABLET ORAL at 01:01

## 2025-01-31 RX ADMIN — HYDRALAZINE HYDROCHLORIDE 25 MG: 25 TABLET ORAL at 05:01

## 2025-01-31 RX ADMIN — BUPRENORPHINE AND NALOXONE 1 FILM: 8; 2 FILM BUCCAL; SUBLINGUAL at 06:01

## 2025-01-31 RX ADMIN — METHOCARBAMOL 500 MG: 500 TABLET ORAL at 11:01

## 2025-01-31 RX ADMIN — SENNOSIDES AND DOCUSATE SODIUM 1 TABLET: 50; 8.6 TABLET ORAL at 08:01

## 2025-01-31 RX ADMIN — METOPROLOL SUCCINATE 25 MG: 25 TABLET, EXTENDED RELEASE ORAL at 08:01

## 2025-01-31 RX ADMIN — BUPRENORPHINE AND NALOXONE 1 FILM: 8; 2 FILM BUCCAL; SUBLINGUAL at 11:01

## 2025-01-31 RX ADMIN — SIMETHICONE 80 MG: 80 TABLET, CHEWABLE ORAL at 06:01

## 2025-01-31 RX ADMIN — METHOCARBAMOL 500 MG: 500 TABLET ORAL at 12:01

## 2025-01-31 NOTE — PLAN OF CARE
Cardiology recommended to hold AC , EP consult as outpatient fo possible watch olvera and 30 day event monitor. Event monitor and EP consult ordered.  Dvt ppx stopped. Discussed with device clinic for event monitor since possible discharge in am.

## 2025-01-31 NOTE — HPI
64 y.o. male w/ a PMH of DM, hip fracture, and paroxysmal afb presented originally with R flank pain. Found to have a RPH with active extravasation on CTA. IR embolized his R testicular artery at that time. Patient was noted to have a RPH on repeat ultrasound but IR has signed off and does not think he is a candidate for drain placement at this time.  Patient received 1u of blood and repeat CBCs have been stable.    The patient state he was intially diagnosed with Afib 6 months ago, felt cramping and heart palpitations. His daughter a nurse had him visit the ED and he was found to be in Afib at the time he was started on metoprolol and Eliquis. He had one other event where he noticed palpitations -- he has since not had other events as far as he can remember. He has been in NSR while admitted, and it is unclear if he had been in Afib while admitted in the outside hospital.     Cardiology is consulted to review anticoagulation of afib in the recent bleeds resulting in MICU admission.

## 2025-01-31 NOTE — SUBJECTIVE & OBJECTIVE
"Past Medical History:   Diagnosis Date    Diabetes mellitus     GERD (gastroesophageal reflux disease)     Hip discomfort     Hip fracture     Hyperlipidemia     Hypertension     Pancreas disorder     Spine pain        Past Surgical History:   Procedure Laterality Date    ANGIOGRAM, RENAL, BILATERAL, SELECTIVE, WITH S&I Bilateral 1/23/2025    Procedure: ANGIOGRAM, RENAL, BILATERAL, SELECTIVE, WITH S&I;  Surgeon: Celeste Win;  Location: [facility];  Service: Anesthesiology;  Laterality: Bilateral;    FRACTURE SURGERY         Review of patient's allergies indicates:   Allergen Reactions    Nubain [nalbuphine] Anaphylaxis    Ativan [lorazepam] Other (See Comments)     Climbs wall         No current facility-administered medications on file prior to encounter.     Current Outpatient Medications on File Prior to Encounter   Medication Sig    alprazolam (XANAX) 1 MG tablet Take 1 tablet (1 mg total) by mouth daily as needed. (Patient not taking: Reported on 1/23/2025)    aspirin 81 MG Chew Take 81 mg by mouth once daily.    blood sugar diagnostic Strp 1 each by Misc.(Non-Drug; Combo Route) route 4 (four) times daily.    buprenorphine-naloxone 8-2 mg (SUBOXONE) 8-2 mg Place 3 Film under the tongue once daily.    flecainide (TAMBOCOR) 50 MG Tab Take 50 mg by mouth 2 (two) times daily.    insulin aspart (NOVOLOG) 100 unit/mL injection INJECT 20 UNITS UNDER SKIN 3 TIMES A DAY BEFORE MEALS    insulin detemir (LEVEMIR FLEXTOUCH) 100 unit/mL (3 mL) SubQ InPn pen Inject 40 Units into the skin every evening.    insulin syringe-needle U-100 0.3 mL 29 gauge x 1/2" Syrg 1 each by Misc.(Non-Drug; Combo Route) route 3 (three) times daily.    lisinopril (PRINIVIL,ZESTRIL) 40 MG tablet Take 1 tablet (40 mg total) by mouth once daily.    metoprolol succinate (TOPROL-XL) 25 MG 24 hr tablet Take 25 mg by mouth once daily.    ondansetron (ZOFRAN-ODT) 4 MG TbDL Take 4 mg by mouth every 6 (six) hours as needed (Nausea).    pen needle, " "diabetic (PEN NEEDLE) 31 gauge x 1/4" Ndle 1 each by Misc.(Non-Drug; Combo Route) route 3 (three) times daily.    [DISCONTINUED] ELIQUIS 5 mg Tab Take 5 mg by mouth 2 (two) times daily.     Family History    None       Tobacco Use    Smoking status: Never    Smokeless tobacco: Current     Types: Chew   Substance and Sexual Activity    Alcohol use: No    Drug use: No    Sexual activity: Yes     Partners: Male     Review of Systems   Genitourinary:  Positive for flank pain.   All other systems reviewed and are negative.  Objective:     Vital Signs (Most Recent):  Temp: 98.5 °F (36.9 °C) (01/31/25 1101)  Pulse: 65 (01/31/25 1101)  Resp: 17 (01/31/25 1101)  BP: 123/62 (01/31/25 1101)  SpO2: 100 % (01/31/25 1101) Vital Signs (24h Range):  Temp:  [96.8 °F (36 °C)-100.1 °F (37.8 °C)] 98.5 °F (36.9 °C)  Pulse:  [65-91] 65  Resp:  [12-18] 17  SpO2:  [94 %-100 %] 100 %  BP: (119-151)/(62-73) 123/62     Weight: 112.6 kg (248 lb 4.8 oz)  Body mass index is 35.63 kg/m².    SpO2: 100 %         Intake/Output Summary (Last 24 hours) at 1/31/2025 1241  Last data filed at 1/30/2025 1740  Gross per 24 hour   Intake 250 ml   Output 200 ml   Net 50 ml       Lines/Drains/Airways       Peripheral Intravenous Line  Duration                  Peripheral IV - Single Lumen 01/23/25 2110 20 G Anterior;Distal;Left Forearm 7 days         Peripheral IV - Single Lumen 01/26/25 1726 18 G 1 in Anterior;Right Upper Arm 4 days         Peripheral IV - Single Lumen 01/27/25 1209 20 G 1 3/4 in Anterior;Left;Proximal Forearm 4 days                     Physical Exam  Vitals and nursing note reviewed.   Constitutional:       Appearance: He is well-developed.   HENT:      Head: Normocephalic.      Mouth/Throat:      Mouth: Mucous membranes are moist.   Eyes:      Pupils: Pupils are equal, round, and reactive to light.   Cardiovascular:      Rate and Rhythm: Normal rate and regular rhythm.      Pulses: Normal pulses.   Pulmonary:      Effort: Pulmonary effort " is normal.   Abdominal:      General: Bowel sounds are normal. There is no distension.      Palpations: Abdomen is soft.      Tenderness: There is no abdominal tenderness. There is no guarding or rebound. R flank pain      Hernia: No hernia is present.   Musculoskeletal:         General: No swelling.   Skin:     General: Skin is warm and dry.   Neurological:      Mental Status: He is alert.   Psychiatric:         Mood and Affect: Mood normal.         Behavior: Behavior normal.         Thought Content: Thought content normal.         Judgment: Judgment normal.      Significant Labs:   Recent Lab Results  (Last 5 results in the past 24 hours)        01/31/25  1051   01/31/25  0811   01/31/25  0439   01/30/25  1952   01/30/25  1605        Albumin     2.5           ALP     95           ALT     22           Anion Gap     10           AST     24           Baso # 0.05                0.05               Basophil % 0.6                0.7               BILIRUBIN TOTAL     0.5  Comment: For infants and newborns, interpretation of results should be based  on gestational age, weight and in agreement with clinical  observations.    Premature Infant recommended reference ranges:  Up to 24 hours.............<8.0 mg/dL  Up to 48 hours............<12.0 mg/dL  3-5 days..................<15.0 mg/dL  6-29 days.................<15.0 mg/dL             BUN     34           Calcium     9.5           Chloride     102           CO2     21           Creatinine     1.0           Differential Method Automated                Automated               eGFR     >60.0           Eos # 0.1                0.1               Eos % 1.3                1.8               Glucose     204           Gran # (ANC) 6.0                5.6               Gran % 76.0                76.1               Hematocrit 28.5                28.1               Hemoglobin 8.8                8.8               Immature Grans (Abs) 0.08  Comment: Mild elevation in immature granulocytes  show is non specific and   can be seen in a variety of conditions including stress response,   acute inflammation, trauma and pregnancy. Correlation with other   laboratory and clinical findings is essential.                  0.07  Comment: Mild elevation in immature granulocytes is non specific and   can be seen in a variety of conditions including stress response,   acute inflammation, trauma and pregnancy. Correlation with other   laboratory and clinical findings is essential.                 Immature Granulocytes 1.0                1.0               Lymph # 0.8                0.8               Lymph % 10.7                10.6               Magnesium      2.4           MCH 27.4                27.8               MCHC 30.9                31.3               MCV 89                89               Mono # 0.8                0.7               Mono % 10.4                9.8               MPV 8.7                8.7               nRBC 0                0               Phosphorus Level     3.6           Platelet Count 539                545               POCT Glucose   140     298   250       Potassium     4.4           PROTEIN TOTAL     8.6           RBC 3.21                3.17               RDW 15.5                15.5               Sodium     133           WBC 7.86                7.33                                      Significant Imaging: Echocardiogram: Transthoracic echo (TTE) complete (Cupid Only): No results found for this or any previous visit.

## 2025-01-31 NOTE — ASSESSMENT & PLAN NOTE
Patient has paroxysmal (<7 days) atrial fibrillation. Patient is currently in sinus rhythm. [id number] Score: 2 (DM and HTN). The patients heart rate in the last 24 hours is as follows:  Pulse  Min: 65  Max: 91     Antiarrhythmics  metoprolol succinate (TOPROL-XL) 24 hr tablet 25 mg, Daily, Oral    Anticoagulants  enoxaparin injection 40 mg, Every 24 hours, Subcutaneous    Plan  - Replete lytes with a goal of K>4, Mg >2  - Patient is not anticoagulated due to RP bleed- holding home AC  - Patient's afib is currently controlled  - Started trial of Lovenox ppx 1/30,   - Cardiology consulted - question about timing of DOAC initiation iso RP bleed needing intervention and renal hematoma

## 2025-01-31 NOTE — ASSESSMENT & PLAN NOTE
Anemia is likely due to acute blood loss which was from RP bleed . Most recent hemoglobin and hematocrit are listed below.    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  - Patient has received 3 units of PRBCs on 1/24 and 1/26  - Patient's anemia is currently stable  - Started on dvt ppx 1/30 - stable h/h on 1/31  - Cardiology consult - Plan for DOAC initiation if recommended by cardiology

## 2025-01-31 NOTE — SUBJECTIVE & OBJECTIVE
Interval History:     Seen and examined at bedside. Ot happy with pain control as did not get suboxone for 12 hours, now changed to every 6 hours from QID.  Complaining of persistent right flank pain 2/10 which gets worse with delay in meds.      Was transferred out of ICU on 1/30 and started on enoxaparin DVT ppx by ICU to start 1/30 at 1700. Cardiology consulted to assess if he can be restarted on Eliquis given RP bleed and renal hematoma.     Review of Systems   Genitourinary:  Positive for flank pain.   All other systems reviewed and are negative.    Objective:     Vital Signs (Most Recent):  Temp: 98.5 °F (36.9 °C) (01/31/25 1101)  Pulse: 65 (01/31/25 1101)  Resp: 17 (01/31/25 1101)  BP: 123/62 (01/31/25 1101)  SpO2: 100 % (01/31/25 1101) Vital Signs (24h Range):  Temp:  [96.8 °F (36 °C)-100.1 °F (37.8 °C)] 98.5 °F (36.9 °C)  Pulse:  [65-91] 65  Resp:  [12-18] 17  SpO2:  [94 %-100 %] 100 %  BP: (119-151)/(62-73) 123/62   Weight: 112.6 kg (248 lb 4.8 oz)  Body mass index is 35.63 kg/m².      Intake/Output Summary (Last 24 hours) at 1/31/2025 1206  Last data filed at 1/30/2025 1740  Gross per 24 hour   Intake 250 ml   Output 200 ml   Net 50 ml          Physical Exam  Vitals and nursing note reviewed.   Constitutional:       Appearance: He is well-developed.   HENT:      Head: Normocephalic.      Mouth/Throat:      Mouth: Mucous membranes are moist.   Eyes:      Pupils: Pupils are equal, round, and reactive to light.   Cardiovascular:      Rate and Rhythm: Normal rate and regular rhythm.      Pulses: Normal pulses.   Pulmonary:      Effort: Pulmonary effort is normal.   Abdominal:      General: Bowel sounds are normal. There is no distension.      Palpations: Abdomen is soft.      Tenderness: There is no abdominal tenderness. There is no guarding or rebound. R flank pain      Hernia: No hernia is present.   Musculoskeletal:         General: No swelling.   Skin:     General: Skin is warm and dry.   Neurological:       Mental Status: He is alert.   Psychiatric:         Mood and Affect: Mood normal.         Behavior: Behavior normal.         Thought Content: Thought content normal.         Judgment: Judgment normal.        Vents:     Lines/Drains/Airways       Peripheral Intravenous Line  Duration                  Peripheral IV - Single Lumen 01/23/25 2110 20 G Anterior;Distal;Left Forearm 7 days         Peripheral IV - Single Lumen 01/26/25 1726 18 G 1 in Anterior;Right Upper Arm 4 days         Peripheral IV - Single Lumen 01/27/25 1209 20 G 1 3/4 in Anterior;Left;Proximal Forearm 3 days                  Significant Labs:    CBC/Anemia Profile:  Recent Labs   Lab 01/30/25  0326 01/31/25  1051   WBC 8.65 7.86  7.33   HGB 8.7* 8.8*  8.8*   HCT 27.7* 28.5*  28.1*   * 539*  545*   MCV 88 89  89   RDW 15.9* 15.5*  15.5*        Chemistries:  Recent Labs   Lab 01/30/25  0326 01/31/25  0439   * 133*   K 4.1 4.4    102   CO2 21* 21*   BUN 25* 34*   CREATININE 0.8 1.0   CALCIUM 8.6* 9.5   ALBUMIN 2.1* 2.5*   PROT 7.3 8.6*   BILITOT 0.4 0.5   ALKPHOS 79 95   ALT 20 22   AST 16 24   MG 2.1 2.4   PHOS 3.2 3.6       All pertinent labs within the past 24 hours have been reviewed.    Significant Imaging:  I have reviewed all pertinent imaging results/findings within the past 24 hours.    Physical Exam

## 2025-01-31 NOTE — ASSESSMENT & PLAN NOTE
Patient has paroxysmal (<7 days) atrial fibrillation. Patient is currently in sinus rhythm. [id number] Score: 1. The patients heart rate in the last 24 hours is as follows:  Pulse  Min: 65  Max: 91     Antiarrhythmics  metoprolol succinate (TOPROL-XL) 24 hr tablet 25 mg, Daily, Oral    Anticoagulants  enoxaparin injection 40 mg, Every 24 hours, Subcutaneous    Plan  -- Hold anticoagulation Eliquis due to high bleeding risk.  -- Arrange outpatient referral to EP for Watchman consideration as a stroke prevention alternative.  -- Initiate 30-day event monitor to assess Afib recurrence/burden

## 2025-01-31 NOTE — ASSESSMENT & PLAN NOTE
Complaints of abdominal distention and mild tenderness since 1/24.  Reports having normal bowel movements every 3-4 days.  Patient reports being able to pass gas.  CTA AP from 01/25 showed decompressed stomach with perigastric soft tissue stranding posteriorly.  No large bowel obstruction noted.  Moderate volume stool throughout the colon.     - c/w  MiraLax b.i.d. and senna/docusate - BM on 1/30  - Oxybutynin for bladder spasms

## 2025-01-31 NOTE — PROGRESS NOTES
[facility] - [facility] Medicine  Progress Note    Patient Name: Joe Morgan  MRN: [id number]  Patient Class: IP- Inpatient   Admission Date: 1/23/2025  Length of Stay: 8 days  Attending Physician: Augustin Oliva MD  Primary Care Provider: Johnnie Gibbs MD        Subjective     Principal Problem:Retroperitoneal hematoma        HPI:  No notes on file    Overview/Hospital Course:  64 y.o. male w/ a PMH of DM, hip fracture, and paroxysmal afb presented originally with R flank pain. Found to have a RPH with active extravasation on CTA. IR embolized his R testicular artery at that time. Patient was noted to have a RPH on repeat ultrasound but IR has signed off and does not think he is a candidate for drain placement at this time. Patient received 1u of blood and repeat CBCs have been stable. Started on Lovenox ppx 1/30.     Interval History:     Seen and examined at bedside. Ot happy with pain control as did not get suboxone for 12 hours, now changed to every 6 hours from QID.  Complaining of persistent right flank pain 2/10 which gets worse with delay in meds.      Was transferred out of ICU on 1/30 and started on enoxaparin DVT ppx by ICU to start 1/30 at 1700. Cardiology consulted to assess if he can be restarted on Eliquis given RP bleed and renal hematoma.     Review of Systems   Genitourinary:  Positive for flank pain.   All other systems reviewed and are negative.    Objective:     Vital Signs (Most Recent):  Temp: 98.5 °F (36.9 °C) (01/31/25 1101)  Pulse: 65 (01/31/25 1101)  Resp: 17 (01/31/25 1101)  BP: 123/62 (01/31/25 1101)  SpO2: 100 % (01/31/25 1101) Vital Signs (24h Range):  Temp:  [96.8 °F (36 °C)-100.1 °F (37.8 °C)] 98.5 °F (36.9 °C)  Pulse:  [65-91] 65  Resp:  [12-18] 17  SpO2:  [94 %-100 %] 100 %  BP: (119-151)/(62-73) 123/62   Weight: 112.6 kg (248 lb 4.8 oz)  Body mass index is 35.63 kg/m².      Intake/Output Summary (Last 24 hours) at 1/31/2025 1200  Last data filed at  1/30/2025 1740  Gross per 24 hour   Intake 250 ml   Output 200 ml   Net 50 ml          Physical Exam  Vitals and nursing note reviewed.   Constitutional:       Appearance: He is well-developed.   HENT:      Head: Normocephalic.      Mouth/Throat:      Mouth: Mucous membranes are moist.   Eyes:      Pupils: Pupils are equal, round, and reactive to light.   Cardiovascular:      Rate and Rhythm: Normal rate and regular rhythm.      Pulses: Normal pulses.   Pulmonary:      Effort: Pulmonary effort is normal.   Abdominal:      General: Bowel sounds are normal. There is no distension.      Palpations: Abdomen is soft.      Tenderness: There is no abdominal tenderness. There is no guarding or rebound. R flank pain      Hernia: No hernia is present.   Musculoskeletal:         General: No swelling.   Skin:     General: Skin is warm and dry.   Neurological:      Mental Status: He is alert.   Psychiatric:         Mood and Affect: Mood normal.         Behavior: Behavior normal.         Thought Content: Thought content normal.         Judgment: Judgment normal.        Vents:     Lines/Drains/Airways       Peripheral Intravenous Line  Duration                  Peripheral IV - Single Lumen 01/23/25 2110 20 G Anterior;Distal;Left Forearm 7 days         Peripheral IV - Single Lumen 01/26/25 1726 18 G 1 in Anterior;Right Upper Arm 4 days         Peripheral IV - Single Lumen 01/27/25 1209 20 G 1 3/4 in Anterior;Left;Proximal Forearm 3 days                  Significant Labs:    CBC/Anemia Profile:  Recent Labs   Lab 01/30/25  0326 01/31/25  1051   WBC 8.65 7.86  7.33   HGB 8.7* 8.8*  8.8*   HCT 27.7* 28.5*  28.1*   * 539*  545*   MCV 88 89  89   RDW 15.9* 15.5*  15.5*        Chemistries:  Recent Labs   Lab 01/30/25  0326 01/31/25  0439   * 133*   K 4.1 4.4    102   CO2 21* 21*   BUN 25* 34*   CREATININE 0.8 1.0   CALCIUM 8.6* 9.5   ALBUMIN 2.1* 2.5*   PROT 7.3 8.6*   BILITOT 0.4 0.5   ALKPHOS 79 95   ALT 20 22  "  AST 16 24   MG 2.1 2.4   PHOS 3.2 3.6       All pertinent labs within the past 24 hours have been reviewed.    Significant Imaging:  I have reviewed all pertinent imaging results/findings within the past 24 hours.    Physical Exam    Assessment and Plan     * Retroperitoneal hematoma  Patient's hemorrhage is due to  unclear reason , patient does have a propensity for bleeding due to a medication, the medication is DOAC.. Patients most recent Hgb, Hct, platelets, and INR are listed below.      Initially in hospitalization -IR consulted for emergent embolization. 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 Admitted to MICU for concerns of hemorrhagic shock.  Hgb was at 10, baseline around 14. Now s/p IR embolization.  Urology consulted, Embolization of gonadal unlikely to cause testicular atrophy due collateral blood supply. Repeat CTA on 1/25 did not show active hemorrhage but redemonstrated large right renal subcapsular hematoma with mass effect suggesting page kidney.  IR consulted for possible drainage of hematoma.  Per IR: "Given dense appearance of hematoma on CT, a percutaneous drain is unlikely to decompress the hematoma. However, recommend right renal ultrasound to evaluate for any lower density regions of the hematoma that may be amendable to drainage."       Recent Labs     01/29/25  0342 01/30/25  0326 01/31/25  1051   HGB 9.3* 8.7* 8.8*  8.8*   HCT 28.8* 27.7* 28.5*  28.1*   * 486* 539*  545*       -Found to have a RPH with active extravasation on CTA. IR embolized his R testicular artery. Patient was noted to have a RPH on repeat ultrasound but IR signed off and does not think he is a candidate for drain placement at this time.   - Renal US 1/25 : Large right subcapsular hematoma. No hydronephrosis. Elevated resistive index possibly secondary to compression of renal parenchyma. Per IR no need for intervention.   -repeat CTA 1/24/25 showed no active bleed.   -Seen by urology - placed outpatient " referral to Urology to establish care in [place].   - work up for aquired coagulapathy- vW ag, Factor 8 activity, fibrinogen elevated, PT INR normal on last check     Plan  - Will trend hemoglobin/hematocrit Daily  - Will monitor and correct any coagulation defects  - Will transfuse if Hgb is <7g/dl (<8g/dl in cases of active ACS) or if patient has rapid bleeding leading to hemodynamic instability  - Lovenox ppx started 1/30  - [id number] VASc score 2 - Cardiology consult - for recommendation on need and timing of DOAC initiation   - On suboxone for pain control - changed to q 6 hours from 4 times daily on 1/31    Acute blood loss anemia  Anemia is likely due to acute blood loss which was from RP bleed . Most recent hemoglobin and hematocrit are listed below.    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  - Patient has received 3 units of PRBCs on 1/24 and 1/26  - Patient's anemia is currently stable  - Started on dvt ppx 1/30 - stable h/h on 1/31  - Cardiology consult - Plan for DOAC initiation if recommended by cardiology    Bladder spasms  - continue oxybutynin      Abdominal distention  Complaints of abdominal distention and mild tenderness since 1/24.  Reports having normal bowel movements every 3-4 days.  Patient reports being able to pass gas.  CTA AP from 01/25 showed decompressed stomach with perigastric soft tissue stranding posteriorly.  No large bowel obstruction noted.  Moderate volume stool throughout the colon.     - c/w  MiraLax b.i.d. and senna/docusate - BM on 1/30  - Oxybutynin for bladder spasms      Paroxysmal atrial fibrillation  Patient has paroxysmal (<7 days) atrial fibrillation. Patient is currently in sinus rhythm.  [id number] Score: 2 (DM and HTN). The patients heart rate in the last 24 hours is as follows:  Pulse  Min: 65  Max: 91     Antiarrhythmics  metoprolol succinate (TOPROL-XL) 24 hr tablet 25 mg, Daily, Oral    Anticoagulants  enoxaparin injection 40 mg, Every 24 hours, Subcutaneous    Plan  - Replete lytes with a goal of K>4, Mg >2  - Patient is not anticoagulated due to RP bleed- holding home AC  - Patient's afib is currently controlled  - Started trial of Lovenox ppx 1/30,   - Cardiology consulted - question about timing of DOAC initiation iso RP bleed needing intervention and renal hematoma         Essential hypertension  Patient's blood pressure range in the last 24 hours was: BP  Min: 119/63  Max: 151/70.The patient's inpatient anti-hypertensive regimen is listed below:  Current Antihypertensives  metoprolol succinate (TOPROL-XL) 24 hr tablet 25 mg, Daily, Oral  amLODIPine tablet 10 mg, Daily, Oral  labetalol 20 mg/4 mL (5 mg/mL) IV syring, Every 4 hours PRN, Intravenous  lisinopriL tablet 40 mg, Daily, Oral  hydrALAZINE tablet 25 mg, Every 8 hours, Oral    Plan  - BP is controlled, no changes needed to their regimen  - Monitor and address as needed    Type 2 diabetes mellitus  Patient's FSGs are uncontrolled due to hyperglycemia on current medication regimen.  Last A1c reviewed-   Lab Results   Component Value Date    LABA1C 10.7 (H) 06/27/2018    HGBA1C 10.1 (H) 01/24/2025     Most recent fingerstick glucose reviewed-   Recent Labs   Lab 01/29/25  1709 01/29/25  2139 01/30/25  0702   POCTGLUCOSE 282* 283* 171*     Current correctional scale  Medium  Maintain anti-hyperglycemic dose as follows-   Antihyperglycemics (From admission, onward)      Start     Stop Route Frequency Ordered    01/30/25 2100  insulin glargine U-100 (Lantus) pen 30 Units         -- SubQ Nightly 01/30/25 1315    01/29/25 1819  insulin aspart U-100 pen 0-10 Units         -- SubQ Before meals & nightly PRN 01/29/25 1719          Hold Oral  hypoglycemics while patient is in the hospital.    Obesity  Body mass index is 35.63 kg/m². Morbid obesity complicates all aspects of disease management from diagnostic modalities to treatment. Weight loss encouraged and health benefits explained to patient.           VTE Risk Mitigation (From admission, onward)           Ordered     enoxaparin injection 40 mg  Every 24 hours         01/30/25 1254     Reason for No Pharmacological VTE Prophylaxis  Once        Question:  Reasons:  Answer:  Active Bleeding    01/23/25 5388     IP VTE HIGH RISK PATIENT  Once         01/23/25 2318     Place sequential compression device  Until discontinued         01/23/25 2318                    Discharge Planning   JUSTA: 2/2/2025     Code Status: Full Code   Medical Readiness for Discharge Date:   Discharge Plan A: Home with family   Discharge Delays: None known at this time                    Augustin Oliva MD  Department of Hospital Medicine   [facility] - Acute Medical Stepdown

## 2025-01-31 NOTE — PLAN OF CARE
Patient is alert and oriented, vital signs stable, no acute event overnight. Patient independent in room, showered, placed back on tele and continuous pulse ox.      Problem: Adult Inpatient Plan of Care  Goal: Plan of Care Review  Outcome: Progressing  Goal: Patient-Specific Goal (Individualized)  Outcome: Progressing  Goal: Absence of Hospital-Acquired Illness or Injury  Outcome: Progressing  Goal: Optimal Comfort and Wellbeing  Outcome: Progressing  Goal: Readiness for Transition of Care  Outcome: Progressing     Problem: Diabetes Comorbidity  Goal: Blood Glucose Level Within Targeted Range  Outcome: Progressing     Problem: Wound  Goal: Optimal Coping  Outcome: Progressing  Goal: Optimal Functional Ability  Outcome: Progressing  Goal: Absence of Infection Signs and Symptoms  Outcome: Progressing  Goal: Improved Oral Intake  Outcome: Progressing  Goal: Optimal Pain Control and Function  Outcome: Progressing  Goal: Skin Health and Integrity  Outcome: Progressing  Goal: Optimal Wound Healing  Outcome: Progressing     Problem: Acute Kidney Injury/Impairment  Goal: Fluid and Electrolyte Balance  Outcome: Progressing  Goal: Improved Oral Intake  Outcome: Progressing  Goal: Effective Renal Function  Outcome: Progressing     Problem: Skin Injury Risk Increased  Goal: Skin Health and Integrity  Outcome: Progressing     Problem: Fall Injury Risk  Goal: Absence of Fall and Fall-Related Injury  Outcome: Progressing

## 2025-01-31 NOTE — NURSING
Shift Note    Pt rested well this shift. VSS. Pain managed with scheduled medications. Suboxone schedule adjusted.  PIV in place and patent. 2 outdated PIV's removed. Skin care completed. T&R per self as tolerated. Safety precautions in place. Call light in reach. No further concerns noted at this time.

## 2025-01-31 NOTE — ASSESSMENT & PLAN NOTE
This patient with atrial fibrillation has been on Eliquis but suffered two major bleeding events, including hemorrhagic shock requiring MICU admission. Given the high bleeding risk with a HAS-BLED score of 2 and moderate stroke risk with a DERRICK?DS?-VASc score of 2, anticoagulation is no longer recommended. Referral to electrophysiology for Watchman evaluation and a 30-day event monitor are advised to assess atrial fibrillation burden before determining long-term management.      Plan:  · Hold anticoagulation Eliquis due to high bleeding risk.  · Arrange outpatient referral to EP for Watchman consideration as a stroke prevention alternative.  · Initiate 30-day event monitor to assess Afib recurrence/burden.

## 2025-01-31 NOTE — ASSESSMENT & PLAN NOTE
"Patient's hemorrhage is due to  unclear reason , patient does have a propensity for bleeding due to a medication, the medication is DOAC.. Patients most recent Hgb, Hct, platelets, and INR are listed below.      Initially in hospitalization -IR consulted for emergent embolization. 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 Admitted to MICU for concerns of hemorrhagic shock.  Hgb was at 10, baseline around 14. Now s/p IR embolization.  Urology consulted, Embolization of gonadal unlikely to cause testicular atrophy due collateral blood supply. Repeat CTA on 1/25 did not show active hemorrhage but redemonstrated large right renal subcapsular hematoma with mass effect suggesting page kidney.  IR consulted for possible drainage of hematoma.  Per IR: "Given dense appearance of hematoma on CT, a percutaneous drain is unlikely to decompress the hematoma. However, recommend right renal ultrasound to evaluate for any lower density regions of the hematoma that may be amendable to drainage."       Recent Labs     01/29/25  0342 01/30/25  0326 01/31/25  1051   HGB 9.3* 8.7* 8.8*  8.8*   HCT 28.8* 27.7* 28.5*  28.1*   * 486* 539*  545*       -Found to have a RPH with active extravasation on CTA. IR embolized his R testicular artery. Patient was noted to have a RPH on repeat ultrasound but IR signed off and does not think he is a candidate for drain placement at this time.   - Renal US 1/25 : Large right subcapsular hematoma. No hydronephrosis. Elevated resistive index possibly secondary to compression of renal parenchyma. Per IR no need for intervention.   -repeat CTA 1/24/25 showed no active bleed.   -Seen by urology - placed outpatient referral to Urology to establish care in [place].   - work up for aquired coagulapathy- vW ag, Factor 8 activity, fibrinogen elevated, PT INR normal on last check     Plan  - Will trend hemoglobin/hematocrit Daily  - Will monitor and correct any coagulation defects  - Will transfuse if " Hgb is <7g/dl (<8g/dl in cases of active ACS) or if patient has rapid bleeding leading to hemodynamic instability  - Lovenox ppx started 1/30  - [id number] VASc score 2 - Cardiology consult - for recommendation on need and timing of DOAC initiation   - On suboxone for pain control - changed to q 6 hours from 4 times daily on 1/31

## 2025-01-31 NOTE — CONSULTS
[facility] - Acute [facility]down  Cardiology  Consult Note    Patient Name: Joe Morgan  MRN: [id number]  Admission Date: 1/23/2025  Hospital Length of Stay: 8 days  Code Status: Full Code   Attending Provider: Augustin Oliva MD   Consulting Provider: Jomar Izquierdo MD  Primary Care Physician: Johnnie Gibbs MD  Principal Problem:On continuous oral anticoagulation    Patient information was obtained from patient and ER records.     Inpatient consult to Cardiology  Consult performed by: Jomar Izquierdo MD  Consult ordered by: Augustin Oliva MD        Subjective:       HPI:   64 y.o. male w/ a PMH of DM, hip fracture, and paroxysmal afb presented originally with R flank pain. Found to have a RPH with active extravasation on CTA. IR embolized his R testicular artery at that time. Patient was noted to have a RPH on repeat ultrasound but IR has signed off and does not think he is a candidate for drain placement at this time.  Patient received 1u of blood and repeat CBCs have been stable.    The patient state he was intially diagnosed with Afib 6 months ago, felt cramping and heart palpitations. His daughter a nurse had him visit the ED and he was found to be in Afib at the time he was started on metoprolol and Eliquis. He had one other event where he noticed palpitations -- he has since not had other events as far as he can remember. He has been in NSR while admitted, and it is unclear if he had been in Afib while admitted in the outside hospital.     Cardiology is consulted to review anticoagulation of afib in the recent bleeds resulting in MICU admission.    Past Medical History:   Diagnosis Date    Diabetes mellitus     GERD (gastroesophageal reflux disease)     Hip discomfort     Hip fracture     Hyperlipidemia     Hypertension     Pancreas disorder     Spine pain        Past Surgical History:   Procedure Laterality Date    ANGIOGRAM, RENAL, BILATERAL, SELECTIVE, WITH S&I Bilateral 1/23/2025    Procedure:  "ANGIOGRAM, RENAL, BILATERAL, SELECTIVE, WITH S&I;  Surgeon: SurgeonCeleste;  Location: [facility];  Service: Anesthesiology;  Laterality: Bilateral;    FRACTURE SURGERY         Review of patient's allergies indicates:   Allergen Reactions    Nubain [nalbuphine] Anaphylaxis    Ativan [lorazepam] Other (See Comments)     Climbs wall         No current facility-administered medications on file prior to encounter.     Current Outpatient Medications on File Prior to Encounter   Medication Sig    alprazolam (XANAX) 1 MG tablet Take 1 tablet (1 mg total) by mouth daily as needed. (Patient not taking: Reported on 1/23/2025)    aspirin 81 MG Chew Take 81 mg by mouth once daily.    blood sugar diagnostic Strp 1 each by Misc.(Non-Drug; Combo Route) route 4 (four) times daily.    buprenorphine-naloxone 8-2 mg (SUBOXONE) 8-2 mg Place 3 Film under the tongue once daily.    flecainide (TAMBOCOR) 50 MG Tab Take 50 mg by mouth 2 (two) times daily.    insulin aspart (NOVOLOG) 100 unit/mL injection INJECT 20 UNITS UNDER SKIN 3 TIMES A DAY BEFORE MEALS    insulin detemir (LEVEMIR FLEXTOUCH) 100 unit/mL (3 mL) SubQ InPn pen Inject 40 Units into the skin every evening.    insulin syringe-needle U-100 0.3 mL 29 gauge x 1/2" Syrg 1 each by Misc.(Non-Drug; Combo Route) route 3 (three) times daily.    lisinopril (PRINIVIL,ZESTRIL) 40 MG tablet Take 1 tablet (40 mg total) by mouth once daily.    metoprolol succinate (TOPROL-XL) 25 MG 24 hr tablet Take 25 mg by mouth once daily.    ondansetron (ZOFRAN-ODT) 4 MG TbDL Take 4 mg by mouth every 6 (six) hours as needed (Nausea).    pen needle, diabetic (PEN NEEDLE) 31 gauge x 1/4" Ndle 1 each by Misc.(Non-Drug; Combo Route) route 3 (three) times daily.    [DISCONTINUED] ELIQUIS 5 mg Tab Take 5 mg by mouth 2 (two) times daily.     Family History    None       Tobacco Use    Smoking status: Never    Smokeless tobacco: Current     Types: Chew   Substance and Sexual Activity    Alcohol use: No    Drug " use: No    Sexual activity: Yes     Partners: Male     Review of Systems   Genitourinary:  Positive for flank pain.   All other systems reviewed and are negative.  Objective:     Vital Signs (Most Recent):  Temp: 98.5 °F (36.9 °C) (01/31/25 1101)  Pulse: 65 (01/31/25 1101)  Resp: 17 (01/31/25 1101)  BP: 123/62 (01/31/25 1101)  SpO2: 100 % (01/31/25 1101) Vital Signs (24h Range):  Temp:  [96.8 °F (36 °C)-100.1 °F (37.8 °C)] 98.5 °F (36.9 °C)  Pulse:  [65-91] 65  Resp:  [12-18] 17  SpO2:  [94 %-100 %] 100 %  BP: (119-151)/(62-73) 123/62     Weight: 112.6 kg (248 lb 4.8 oz)  Body mass index is 35.63 kg/m².    SpO2: 100 %         Intake/Output Summary (Last 24 hours) at 1/31/2025 1241  Last data filed at 1/30/2025 1740  Gross per 24 hour   Intake 250 ml   Output 200 ml   Net 50 ml       Lines/Drains/Airways       Peripheral Intravenous Line  Duration                  Peripheral IV - Single Lumen 01/23/25 2110 20 G Anterior;Distal;Left Forearm 7 days         Peripheral IV - Single Lumen 01/26/25 1726 18 G 1 in Anterior;Right Upper Arm 4 days         Peripheral IV - Single Lumen 01/27/25 1209 20 G 1 3/4 in Anterior;Left;Proximal Forearm 4 days                     Physical Exam  Vitals and nursing note reviewed.   Constitutional:       Appearance: He is well-developed.   HENT:      Head: Normocephalic.      Mouth/Throat:      Mouth: Mucous membranes are moist.   Eyes:      Pupils: Pupils are equal, round, and reactive to light.   Cardiovascular:      Rate and Rhythm: Normal rate and regular rhythm.      Pulses: Normal pulses.   Pulmonary:      Effort: Pulmonary effort is normal.   Abdominal:      General: Bowel sounds are normal. There is no distension.      Palpations: Abdomen is soft.      Tenderness: There is no abdominal tenderness. There is no guarding or rebound. R flank pain      Hernia: No hernia is present.   Musculoskeletal:         General: No swelling.   Skin:     General: Skin is warm and dry.   Neurological:       Mental Status: He is alert.   Psychiatric:         Mood and Affect: Mood normal.         Behavior: Behavior normal.         Thought Content: Thought content normal.         Judgment: Judgment normal.      Significant Labs:   Recent Lab Results  (Last 5 results in the past 24 hours)        01/31/25  1051   01/31/25  0811   01/31/25  0439   01/30/25 1952   01/30/25  1605        Albumin     2.5           ALP     95           ALT     22           Anion Gap     10           AST     24           Baso # 0.05                0.05               Basophil % 0.6                0.7               BILIRUBIN TOTAL     0.5  Comment: For infants and newborns, interpretation of results should be based  on gestational age, weight and in agreement with clinical  observations.    Premature Infant recommended reference ranges:  Up to 24 hours.............<8.0 mg/dL  Up to 48 hours............<12.0 mg/dL  3-5 days..................<15.0 mg/dL  6-29 days.................<15.0 mg/dL             BUN     34           Calcium     9.5           Chloride     102           CO2     21           Creatinine     1.0           Differential Method Automated                Automated               eGFR     >60.0           Eos # 0.1                0.1               Eos % 1.3                1.8               Glucose     204           Gran # (ANC) 6.0                5.6               Gran % 76.0                76.1               Hematocrit 28.5                28.1               Hemoglobin 8.8                8.8               Immature Grans (Abs) 0.08  Comment: Mild elevation in immature granulocytes is non specific and   can be seen in a variety of conditions including stress response,   acute inflammation, trauma and pregnancy. Correlation with other   laboratory and clinical findings is essential.                  0.07  Comment: Mild elevation in immature granulocytes is non specific and   can be seen in a variety of conditions including stress  response,   acute inflammation, trauma and pregnancy. Correlation with other   laboratory and clinical findings is essential.                 Immature Granulocytes 1.0                1.0               Lymph # 0.8                0.8               Lymph % 10.7                10.6               Magnesium      2.4           MCH 27.4                27.8               MCHC 30.9                31.3               MCV 89                89               Mono # 0.8                0.7               Mono % 10.4                9.8               MPV 8.7                8.7               nRBC 0                0               Phosphorus Level     3.6           Platelet Count 539                545               POCT Glucose   140     298   250       Potassium     4.4           PROTEIN TOTAL     8.6           RBC 3.21                3.17               RDW 15.5                15.5               Sodium     133           WBC 7.86                7.33                                      Significant Imaging: Echocardiogram: Transthoracic echo (TTE) complete (Cupid Only): No results found for this or any previous visit.  Assessment and Plan:     * On continuous oral anticoagulation  This patient with atrial fibrillation has been on Eliquis but suffered two major bleeding events, including hemorrhagic shock requiring MICU admission. Given the high bleeding risk with and moderate stroke risk with a DERRICK?DS?-VASc score of 2 and shared decision making with patient -- will proceed with holding anticoagulation. Referral to electrophysiology for Watchman evaluation and a 30-day event monitor are advised to assess atrial fibrillation burden before determining long-term management.      Plan:  -- Hold anticoagulation Eliquis due to high bleeding risk.  -- Arrange outpatient referral to EP for Watchman consideration as a stroke prevention alternative.  -- Initiate 30-day event monitor to assess Afib recurrence/burden.        Paroxysmal atrial  fibrillation  Patient has paroxysmal (<7 days) atrial fibrillation. Patient is currently in sinus rhythm. [id number] Score: 1. The patients heart rate in the last 24 hours is as follows:  Pulse  Min: 65  Max: 91     Antiarrhythmics  metoprolol succinate (TOPROL-XL) 24 hr tablet 25 mg, Daily, Oral    Anticoagulants  enoxaparin injection 40 mg, Every 24 hours, Subcutaneous    Plan  -- Hold anticoagulation Eliquis due to high bleeding risk.  -- Arrange outpatient referral to EP for Watchman consideration as a stroke prevention alternative.  -- Initiate 30-day event monitor to assess Afib recurrence/burden   -- Continue metoprolol succinate 25 mg daily      VTE Risk Mitigation (From admission, onward)           Ordered     enoxaparin injection 40 mg  Every 24 hours         01/30/25 1254     Reason for No Pharmacological VTE Prophylaxis  Once        Question:  Reasons:  Answer:  Active Bleeding    01/23/25 2338     IP VTE HIGH RISK PATIENT  Once         01/23/25 2318     Place sequential compression device  Until discontinued         01/23/25 2318                    Thank you for your consult. I will sign off. Please contact us if you have any additional questions.    Jomar Izquierdo MD  Cardiology   [facility] - Acute [facility] Stepdown

## 2025-01-31 NOTE — TELEPHONE ENCOUNTER
Patient's insurance does not cover services for event monitor at current location. Patient made aware and potential cost of $155 out of pocket obligation.  Patient states he would prefer to have the services coordinated closer to his home.  Patient lives in [place].  Patient has been scheduled @ [facility] on Monday @ 2:00pm.   made aware.  Attempted to confirm appt with patient, no answer, left voicemail with appt date and time and location address.

## 2025-02-01 VITALS
SYSTOLIC BLOOD PRESSURE: 130 MMHG | OXYGEN SATURATION: 96 % | TEMPERATURE: 98 F | RESPIRATION RATE: 17 BRPM | WEIGHT: 248.31 LBS | HEART RATE: 74 BPM | DIASTOLIC BLOOD PRESSURE: 60 MMHG | BODY MASS INDEX: 35.55 KG/M2 | HEIGHT: 70 IN

## 2025-02-01 PROBLEM — R14.0 ABDOMINAL DISTENTION: Status: RESOLVED | Noted: 2025-01-25 | Resolved: 2025-02-01

## 2025-02-01 PROBLEM — Z79.01 ON CONTINUOUS ORAL ANTICOAGULATION: Status: RESOLVED | Noted: 2025-01-31 | Resolved: 2025-02-01

## 2025-02-01 PROBLEM — D62 ACUTE BLOOD LOSS ANEMIA: Status: RESOLVED | Noted: 2025-01-30 | Resolved: 2025-02-01

## 2025-02-01 LAB
ALBUMIN SERPL BCP-MCNC: 2.3 G/DL (ref 3.5–5.2)
ALP SERPL-CCNC: 86 U/L (ref 40–150)
ALT SERPL W/O P-5'-P-CCNC: 26 U/L (ref 10–44)
ANION GAP SERPL CALC-SCNC: 9 MMOL/L (ref 8–16)
AST SERPL-CCNC: 28 U/L (ref 10–40)
BASOPHILS # BLD AUTO: 0.06 K/UL (ref 0–0.2)
BASOPHILS NFR BLD: 0.8 % (ref 0–1.9)
BILIRUB SERPL-MCNC: 0.4 MG/DL (ref 0.1–1)
BUN SERPL-MCNC: 28 MG/DL (ref 8–23)
CALCIUM SERPL-MCNC: 9 MG/DL (ref 8.7–10.5)
CHLORIDE SERPL-SCNC: 102 MMOL/L (ref 95–110)
CO2 SERPL-SCNC: 21 MMOL/L (ref 23–29)
CREAT SERPL-MCNC: 0.8 MG/DL (ref 0.5–1.4)
DIFFERENTIAL METHOD BLD: ABNORMAL
EOSINOPHIL # BLD AUTO: 0.1 K/UL (ref 0–0.5)
EOSINOPHIL NFR BLD: 1.3 % (ref 0–8)
ERYTHROCYTE [DISTWIDTH] IN BLOOD BY AUTOMATED COUNT: 15.4 % (ref 11.5–14.5)
EST. GFR  (NO RACE VARIABLE): >60 ML/MIN/1.73 M^2
GLUCOSE SERPL-MCNC: 115 MG/DL (ref 70–110)
HCT VFR BLD AUTO: 28.5 % (ref 40–54)
HGB BLD-MCNC: 8.9 G/DL (ref 14–18)
IMM GRANULOCYTES # BLD AUTO: 0.07 K/UL (ref 0–0.04)
IMM GRANULOCYTES NFR BLD AUTO: 1 % (ref 0–0.5)
LYMPHOCYTES # BLD AUTO: 0.8 K/UL (ref 1–4.8)
LYMPHOCYTES NFR BLD: 11.3 % (ref 18–48)
MAGNESIUM SERPL-MCNC: 2.2 MG/DL (ref 1.6–2.6)
MCH RBC QN AUTO: 28 PG (ref 27–31)
MCHC RBC AUTO-ENTMCNC: 31.2 G/DL (ref 32–36)
MCV RBC AUTO: 90 FL (ref 82–98)
MONOCYTES # BLD AUTO: 0.8 K/UL (ref 0.3–1)
MONOCYTES NFR BLD: 10.9 % (ref 4–15)
NEUTROPHILS # BLD AUTO: 5.3 K/UL (ref 1.8–7.7)
NEUTROPHILS NFR BLD: 74.7 % (ref 38–73)
NRBC BLD-RTO: 0 /100 WBC
PHOSPHATE SERPL-MCNC: 3.5 MG/DL (ref 2.7–4.5)
PLATELET # BLD AUTO: 535 K/UL (ref 150–450)
PMV BLD AUTO: 8.9 FL (ref 9.2–12.9)
POCT GLUCOSE: 116 MG/DL (ref 70–110)
POTASSIUM SERPL-SCNC: 4.2 MMOL/L (ref 3.5–5.1)
PROT SERPL-MCNC: 7.6 G/DL (ref 6–8.4)
RBC # BLD AUTO: 3.18 M/UL (ref 4.6–6.2)
SODIUM SERPL-SCNC: 132 MMOL/L (ref 136–145)
WBC # BLD AUTO: 7.08 K/UL (ref 3.9–12.7)

## 2025-02-01 PROCEDURE — 84100 ASSAY OF PHOSPHORUS: CPT

## 2025-02-01 PROCEDURE — 80053 COMPREHEN METABOLIC PANEL: CPT

## 2025-02-01 PROCEDURE — 83735 ASSAY OF MAGNESIUM: CPT

## 2025-02-01 PROCEDURE — 25000003 PHARM REV CODE 250: Performed by: INTERNAL MEDICINE

## 2025-02-01 PROCEDURE — 85025 COMPLETE CBC W/AUTO DIFF WBC: CPT | Performed by: STUDENT IN AN ORGANIZED HEALTH CARE EDUCATION/TRAINING PROGRAM

## 2025-02-01 PROCEDURE — 63600175 PHARM REV CODE 636 W HCPCS: Performed by: STUDENT IN AN ORGANIZED HEALTH CARE EDUCATION/TRAINING PROGRAM

## 2025-02-01 PROCEDURE — 25000003 PHARM REV CODE 250

## 2025-02-01 PROCEDURE — 36415 COLL VENOUS BLD VENIPUNCTURE: CPT

## 2025-02-01 RX ORDER — AMLODIPINE BESYLATE 10 MG/1
10 TABLET ORAL DAILY
Qty: 90 TABLET | Refills: 3 | Status: SHIPPED | OUTPATIENT
Start: 2025-02-02 | End: 2026-02-02

## 2025-02-01 RX ORDER — OXYBUTYNIN CHLORIDE 5 MG/1
5 TABLET, EXTENDED RELEASE ORAL DAILY
Qty: 30 TABLET | Refills: 11 | Status: SHIPPED | OUTPATIENT
Start: 2025-02-02 | End: 2026-02-02

## 2025-02-01 RX ORDER — BUPRENORPHINE AND NALOXONE 8; 2 MG/1; MG/1
1 FILM, SOLUBLE BUCCAL; SUBLINGUAL EVERY 6 HOURS
Qty: 28 FILM | Refills: 0 | Status: SHIPPED | OUTPATIENT
Start: 2025-02-01 | End: 2025-03-03

## 2025-02-01 RX ORDER — METHOCARBAMOL 500 MG/1
500 TABLET, FILM COATED ORAL EVERY 6 HOURS
Qty: 40 TABLET | Refills: 0 | Status: SHIPPED | OUTPATIENT
Start: 2025-02-01 | End: 2025-02-11

## 2025-02-01 RX ADMIN — METHOCARBAMOL 500 MG: 500 TABLET ORAL at 06:02

## 2025-02-01 RX ADMIN — BUPRENORPHINE AND NALOXONE 1 FILM: 8; 2 FILM BUCCAL; SUBLINGUAL at 06:02

## 2025-02-01 RX ADMIN — OXYBUTYNIN CHLORIDE 5 MG: 5 TABLET, EXTENDED RELEASE ORAL at 09:02

## 2025-02-01 RX ADMIN — METOPROLOL SUCCINATE 25 MG: 25 TABLET, EXTENDED RELEASE ORAL at 09:02

## 2025-02-01 RX ADMIN — LISINOPRIL 40 MG: 20 TABLET ORAL at 09:02

## 2025-02-01 RX ADMIN — AMLODIPINE BESYLATE 10 MG: 10 TABLET ORAL at 09:02

## 2025-02-01 RX ADMIN — HYDRALAZINE HYDROCHLORIDE 25 MG: 25 TABLET ORAL at 06:02

## 2025-02-01 RX ADMIN — BUPRENORPHINE AND NALOXONE 1 FILM: 8; 2 FILM BUCCAL; SUBLINGUAL at 12:02

## 2025-02-01 RX ADMIN — METHOCARBAMOL 500 MG: 500 TABLET ORAL at 12:02

## 2025-02-01 NOTE — PLAN OF CARE
[facility] - Acute Medical Stepdown  Discharge Final Note    Primary Care Provider: Johnnie Gibbs MD    Expected Discharge Date: 2/1/2025  Discharge Plan A and Plan B have been determined by review of patient's clinical status, future medical and therapeutic needs, and coverage/benefits for post-acute care in coordination with multidisciplinary team members.     Final Discharge Note (most recent)       Final Note - 02/01/25 1122          Final Note    Assessment Type Final Discharge Note     What phone number can be called within the next 1-3 days to see how you are doing after discharge? [phone]     Hospital Resources/Appts/Education Provided Appointments scheduled and added to AVS        Post-Acute Status    Post-Acute Authorization Other     Home Health Status Patient declined/refused     Coverage [employer] [place] -     Discharge Delays None known at this time                          Contact Info       Johnnie Gibbs MD   Specialty: Family Medicine, Internal Medicine   Relationship: PCP - General    [address]  [facility] [address]   Phone: [phone]       Next Steps: Follow up in 1 week(s)          Future Appointments   Date Time Provider Department Center   2/3/2025  2:00 PM [facility] EKG/ [facility], [facility]     Follow ups:  PCP       Referrals : Cardiology , Urology     CM met with patient   and discussed discharge plans, patient to [place] home with  spouse. Patients  medications delivered to bedside, and follow up appointment[s] scheduled.   Transportation provided by family: spouse via private vehicle.      Nelli Mccoy RN  Case Management  [facility]  [phone]

## 2025-02-01 NOTE — NURSING
Pt discharged home. Rec'd discharge instructions and return to work note. Wife is picking up prescriptions from [facility]. Pt ambulated to car. IV and tele box removed prior to discharge. Nad present.

## 2025-02-01 NOTE — DISCHARGE SUMMARY
[facility] Medicine  Discharge Summary      Patient Name: Joe Morgan  MRN: [id number]  TAVARES: [id number]  Patient Class: IP- Inpatient  Admission Date: 1/23/2025  Hospital Length of Stay: 9 days  Discharge Date and Time:  02/01/2025 2:57 PM  Attending Physician: Jameel Raya DO   Discharging Provider: Jameel Raya DO  Primary Care Provider: Johnnie Gibbs MD  [facility] Medicine Team: [facility] HOSP MED A Jameel Raya DO  Primary Care Team: [facility] A    HPI:   No notes on file    Procedure(s) (LRB):  ANGIOGRAM, RENAL, BILATERAL, SELECTIVE, WITH S&I (Bilateral)      Hospital Course:   64 y.o. male w/ a PMH of DM, hip fracture, and paroxysmal afb presented originally with R flank pain. Found to have a RPH with active extravasation on CTA. IR embolized his R testicular artery at that time. Patient was noted to have a RPH on repeat ultrasound but IR has signed off and does not think he is a candidate for drain placement at this time. Patient received 1u of blood and repeat CBCs have been stable. Started on Lovenox ppx 1/30.       Pain is controlled with increased dose of suboxone. Patient to follow up with his PCP/pain management for ongoing treatment    Patient to follow up with cardiology and urology for continued care  - patient in agreement with holding AC for A fib until follow up with cardiology, urology and pcp     Care plan reviewed with patient, he is in agreement with plan. ED return precautions given. All questions answered, patient discharged in stable condition      PE  No acute distress  Heart rrr  Lungs cta  R groin cath site clean and dry, well healing     Goals of Care Treatment Preferences:  Code Status: Full Code      SDOH Screening:  The patient was screened for utility difficulties, food insecurity, transport difficulties, housing insecurity, and interpersonal safety and there were no concerns identified this admission.     Consults:   Consults  "(From admission, onward)          Status Ordering Provider     Inpatient consult to Cardiology  Once        Provider:  (Not yet assigned)    Completed EZ GOYAL     Inpatient consult to Interventional Radiology  Once        Provider:  (Not yet assigned)    Completed MINOR MURPHY     Inpatient consult to Registered Dietitian/Nutritionist  Once        Provider:  (Not yet assigned)    Completed COTY WARD     Inpatient consult to Urology  Once        Provider:  (Not yet assigned)    Completed MINOR MURPHY     Inpatient consult to Critical Care Medicine  Once        Provider:  (Not yet assigned)    Completed JAS SHAFFER            Bladder spasms  - continue oxybutynin      Retroperitoneal hematoma  Patient's hemorrhage is due to  unclear reason , patient does have a propensity for bleeding due to a medication, the medication is DOAC.. Patients most recent Hgb, Hct, platelets, and INR are listed below.      Initially in hospitalization -IR consulted for emergent embolization. 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 Admitted to MICU for concerns of hemorrhagic shock.  Hgb was at 10, baseline around 14. Now s/p IR embolization.  Urology consulted, Embolization of gonadal unlikely to cause testicular atrophy due collateral blood supply. Repeat CTA on 1/25 did not show active hemorrhage but redemonstrated large right renal subcapsular hematoma with mass effect suggesting page kidney.  IR consulted for possible drainage of hematoma.  Per IR: "Given dense appearance of hematoma on CT, a percutaneous drain is unlikely to decompress the hematoma. However, recommend right renal ultrasound to evaluate for any lower density regions of the hematoma that may be amendable to drainage."       Recent Labs     01/29/25  0342 01/30/25  0326 01/31/25  1051   HGB 9.3* 8.7* 8.8*  8.8*   HCT 28.8* 27.7* 28.5*  28.1*   * 486* 539*  545*       -Found to have a RPH with active extravasation on CTA. IR " embolized his R testicular artery. Patient was noted to have a RPH on repeat ultrasound but IR signed off and does not think he is a candidate for drain placement at this time.   - Renal US 1/25 : Large right subcapsular hematoma. No hydronephrosis. Elevated resistive index possibly secondary to compression of renal parenchyma. Per IR no need for intervention.   -repeat CTA 1/24/25 showed no active bleed.   -Seen by urology - placed outpatient referral to Urology to establish care in [place].   - work up for aquired coagulapathy- vW ag, Factor 8 activity, fibrinogen elevated, PT INR normal on last check     Plan  - Will trend hemoglobin/hematocrit Daily  - Will monitor and correct any coagulation defects  - Will transfuse if Hgb is <7g/dl (<8g/dl in cases of active ACS) or if patient has rapid bleeding leading to hemodynamic instability  - Lovenox ppx started 1/30  - [id number] VASc score 2 - Cardiology consult - for recommendation on need and timing of DOAC initiation   - On suboxone for pain control - changed to q 6 hours from 4 times daily on 1/31    Paroxysmal atrial fibrillation  Patient has paroxysmal (<7 days) atrial fibrillation. Patient is currently in sinus rhythm. [id number] Score: 2 (DM and HTN). The patients heart rate in the last 24 hours is as follows:  Pulse  Min: 65  Max: 91     Antiarrhythmics  metoprolol succinate (TOPROL-XL) 24 hr tablet 25 mg, Daily, Oral    Anticoagulants  enoxaparin injection 40 mg, Every 24 hours, Subcutaneous    Plan  - Replete lytes with a goal of K>4, Mg >2  - Patient is not anticoagulated due to RP bleed- holding home AC  - Patient's afib is currently controlled  - Started trial of Lovenox ppx 1/30,   - Cardiology consulted - question about timing of DOAC initiation iso RP bleed needing intervention and renal hematoma         Essential hypertension  Patient's blood pressure range in the last 24 hours was: BP  Min: 119/63  Max: 151/70.The patient's inpatient  anti-hypertensive regimen is listed below:  Current Antihypertensives  metoprolol succinate (TOPROL-XL) 24 hr tablet 25 mg, Daily, Oral  amLODIPine tablet 10 mg, Daily, Oral  labetalol 20 mg/4 mL (5 mg/mL) IV syring, Every 4 hours PRN, Intravenous  lisinopriL tablet 40 mg, Daily, Oral  hydrALAZINE tablet 25 mg, Every 8 hours, Oral    Plan  - BP is controlled, no changes needed to their regimen  - Monitor and address as needed    Type 2 diabetes mellitus  Patient's FSGs are uncontrolled due to hyperglycemia on current medication regimen.  Last A1c reviewed-   Lab Results   Component Value Date    LABA1C 10.7 (H) 06/27/2018    HGBA1C 10.1 (H) 01/24/2025     Most recent fingerstick glucose reviewed-   Recent Labs   Lab 01/29/25  1709 01/29/25  2139 01/30/25  0702   POCTGLUCOSE 282* 283* 171*     Current correctional scale  Medium  Maintain anti-hyperglycemic dose as follows-   Antihyperglycemics (From admission, onward)      Start     Stop Route Frequency Ordered    01/30/25 2100  insulin glargine U-100 (Lantus) pen 30 Units         -- SubQ Nightly 01/30/25 1315    01/29/25 1819  insulin aspart U-100 pen 0-10 Units         -- SubQ Before meals & nightly PRN 01/29/25 1719          Hold Oral hypoglycemics while patient is in the hospital.    Obesity  Body mass index is 35.63 kg/m². Morbid obesity complicates all aspects of disease management from diagnostic modalities to treatment. Weight loss encouraged and health benefits explained to patient.           Final Active Diagnoses:    Diagnosis Date Noted POA    Bladder spasms [N32.89] 01/30/2025 No    Retroperitoneal hematoma [K68.3] 01/24/2025 Yes    Paroxysmal atrial fibrillation [I48.0] 06/25/2024 Yes    Essential hypertension [I10] 04/15/2016 Yes    Type 2 diabetes mellitus [E11.9] 10/08/2015 Yes    Obesity [E66.9] 10/08/2015 Yes      Problems Resolved During this Admission:    Diagnosis Date Noted Date Resolved POA    PRINCIPAL PROBLEM:  On continuous oral  anticoagulation [Z79.01] 01/31/2025 02/01/2025 Not Applicable    Acute blood loss anemia [D62] 01/30/2025 02/01/2025 Yes    Abdominal distention [R14.0] 01/25/2025 02/01/2025 Yes    LAUREN (acute kidney injury) [N17.9] 01/24/2025 01/25/2025 Yes       Discharged Condition: good    Disposition: Home or Self Care    Follow Up:   Follow-up Information       Johnnie Gibbs MD Follow up in 1 week(s).    Specialties: Family Medicine, Internal Medicine  Contact information:  [id number]  [facility] [address]  [phone]                           Patient Instructions:      Ambulatory referral/consult to Urology   Standing Status: Future   Referral Priority: Routine Referral Type: Consultation   Referral Reason: Specialty Services Required   Requested Specialty: Urology   Number of Visits Requested: 1     Ambulatory referral/consult to Cardiac Electrophysiology   Standing Status: Future   Referral Priority: Routine Referral Type: Consultation   Referral Reason: Specialty Services Required   Requested Specialty: Cardiology   Number of Visits Requested: 1     Cardiac event monitor   Standing Status: Future Standing Exp. Date: 01/31/26     Order Specific Question Answer Comments   Physician to read study:  [facility] cardiology/EP team   Cardiac Event Monitor Auto Trigger    Release to patient Immediate        Significant Diagnostic Studies: N/A    Pending Diagnostic Studies:       None           Medications:  Reconciled Home Medications:      Medication List        START taking these medications      amLODIPine 10 MG tablet  Commonly known as: NORVASC  Take 1 tablet (10 mg total) by mouth once daily.  Start taking on: February 2, 2025     methocarbamoL 500 MG Tab  Commonly known as: ROBAXIN  Take 1 tablet (500 mg total) by mouth every 6 (six) hours. for 10 days     oxybutynin 5 MG Tr24  Commonly known as: DITROPAN-XL  Take 1 tablet (5 mg total) by mouth once daily.  Start taking on: February 2, 2025            CHANGE how you  "take these medications      buprenorphine-naloxone 8-2 mg 8-2 mg  Commonly known as: SUBOXONE  Place 1 Film under the tongue every 6 (six) hours.  What changed:   how much to take  when to take this            CONTINUE taking these medications      blood sugar diagnostic Strp  1 each by Misc.(Non-Drug; Combo Route) route 4 (four) times daily.     insulin detemir U-100 (Levemir) 100 unit/mL (3 mL) Inpn pen  Inject 40 Units into the skin every evening.     insulin syringe-needle U-100 0.3 mL 29 gauge x 1/2" Syrg  1 each by Misc.(Non-Drug; Combo Route) route 3 (three) times daily.     lisinopriL 40 MG tablet  Commonly known as: PRINIVIL,ZESTRIL  Take 1 tablet (40 mg total) by mouth once daily.     metoprolol succinate 25 MG 24 hr tablet  Commonly known as: TOPROL-XL  Take 25 mg by mouth once daily.     ondansetron 4 MG Tbdl  Commonly known as: ZOFRAN-ODT  Take 4 mg by mouth every 6 (six) hours as needed (Nausea).     pen needle, diabetic 31 gauge x 1/4" Ndle  Commonly known as: PEN NEEDLE  1 each by Misc.(Non-Drug; Combo Route) route 3 (three) times daily.            STOP taking these medications      aspirin 81 MG Chew     ELIQUIS 5 mg Tab  Generic drug: apixaban     flecainide 50 MG Tab  Commonly known as: TAMBOCOR     insulin aspart U-100 100 unit/mL injection  Commonly known as: NovoLOG U-100 Insulin aspart     traMADoL 50 mg tablet  Commonly known as: ULTRAM            ASK your doctor about these medications      ALPRAZolam 1 MG tablet  Commonly known as: XANAX  Take 1 tablet (1 mg total) by mouth daily as needed.              Indwelling Lines/Drains at time of discharge:   Lines/Drains/Airways       None                   Time spent on the discharge of patient: 47 minutes         Jameel Raya DO  Department of Hospital Medicine  [facility] - Acute Medical Stepdown  "

## 2025-02-01 NOTE — PLAN OF CARE
Discharge Plan A and Plan B have been determined by review of patient's clinical status, future medical and therapeutic needs, and coverage/benefits for post-acute care in coordination with multidisciplinary team members.    01/31/25 1934   Post-Acute Status   Post-Acute Authorization Home Health   Home Health Status Referrals Sent   [facility]   Hospital Resources/Appts/Education Provided Appointments scheduled and added to S   Patient choice form signed by patient/caregiver List with quality metrics by geographic area provided;List from System Post-Acute Care   Discharge Delays None known at this time   Discharge Plan   Discharge Plan A Home Health   Discharge Plan B Home with family     Met with patient on 1-/30/25  to review discharge recommendation of  HH  and is agreeable to plan    Patient/family provided list of facilities in-network with patient's payor plan. Providers that are owned, operated, or affiliated with [facility] are included on the list.     Notified that referral sent to below listed facilities from in-network list based on proximity to home/family support:     [facility] [phone]   Patient/family instructed to identify preference.    Preferred Facility: (if more than 1, listed in order of descending preference)   No verbalized preference     If an additional preferred facility not listed above is identified, additional referral to be sent. If above facilities unable to accept, will send additional referrals to in-network providers.        Nelli Mccoy RN  Case Management  [facility]  [phone]

## 2025-02-01 NOTE — PLAN OF CARE
CHW attempted to contact [facility] , unable to reach or leave VM.    Unable to schedule urology appointment due to insurance barrier.    Lidia Huffman, SINDIW, RSW, BSW  Case Management  [phone]

## 2025-02-04 ENCOUNTER — TELEPHONE (OUTPATIENT)
Dept: ELECTROPHYSIOLOGY | Facility: CLINIC | Age: 65
End: 2025-02-04
Payer: COMMERCIAL

## 2025-02-04 NOTE — TELEPHONE ENCOUNTER
Called patient to let him know that he has been referred to see one of the EP doctors. Patient's insurance does not allow him to come to main [place]. He has to see someone in [place]. Patient did not answer. I left a message with my call back number.

## 2025-02-05 ENCOUNTER — HOSPITAL ENCOUNTER (OUTPATIENT)
Dept: CARDIOLOGY | Facility: HOSPITAL | Age: 65
Discharge: HOME | End: 2025-02-05
Attending: STUDENT IN AN ORGANIZED HEALTH CARE EDUCATION/TRAINING PROGRAM
Payer: COMMERCIAL

## 2025-02-05 DIAGNOSIS — I48.0 PAROXYSMAL ATRIAL FIBRILLATION: ICD-10-CM

## 2025-02-05 DIAGNOSIS — R58 RETROPERITONEAL HEMORRHAGE: ICD-10-CM

## 2025-02-06 ENCOUNTER — TELEPHONE (OUTPATIENT)
Dept: UROLOGY | Facility: CLINIC | Age: 65
End: 2025-02-06
Payer: COMMERCIAL

## 2025-02-06 NOTE — TELEPHONE ENCOUNTER
----- Message from Malinda sent at 2/6/2025  9:12 AM CST -----  Regarding: Needs return call  Type: Needs Medical Advice  Who Called:  Wife    Best Call Back Number: [phone]  Additional Information: Pt was recently in the hospital for hematomas on his kidneys had surgery for it and artery bleed, he has been recovering but still in pain, she wanted to speak to someone to see if this is the office he needs to be seen regarding this, please advise

## 2025-02-07 ENCOUNTER — TELEPHONE (OUTPATIENT)
Dept: UROLOGY | Facility: CLINIC | Age: 65
End: 2025-02-07
Payer: COMMERCIAL

## 2025-02-07 NOTE — TELEPHONE ENCOUNTER
Spoke with pt wife. Informed her that the pt insurance is out of network, reach out to the insurance company to be better directed where to go. Pt wife verbalized understanding.

## 2025-02-07 NOTE — TELEPHONE ENCOUNTER
----- Message from Zoila sent at 2/7/2025 12:29 PM CST -----  Contact: self  Type:  Hospital F/U    Caller is requesting a hospital f/u.    Name of Caller:pt     When is the first available appointment?n/a      Would the patient rather a call back or a response via [facility]? Call back     Best Call Back Number:[phone]    Additional Information:    Please call back to advise. Thanks!

## 2025-02-10 ENCOUNTER — TELEPHONE (OUTPATIENT)
Dept: UROLOGY | Facility: CLINIC | Age: 65
End: 2025-02-10
Payer: COMMERCIAL

## 2025-02-10 NOTE — TELEPHONE ENCOUNTER
Spoke with patient's wife, explained [facility] [employer] [facility]. Explained she will need to contact [facility]

## 2025-02-10 NOTE — TELEPHONE ENCOUNTER
----- Message from Daniela sent at 2/10/2025  8:48 AM CST -----  Contact: pt  Type: Needs Medical Advice         Who Called: pt wife - aditi Valverde Call Back Number:  [phone]  Additional Information: Requesting a call back regarding  Swelling of feet and legs,post Arterial bleeding , hematoma on kidney, Pt is having back pain and wife is concerned with the swelling. Pt was admitted in [facility] and has Arterial bleeding procedure , pt spent 9 days admitted. Pt was discharged on 02/01 Pt started with Swelling of feet and legs on Friday and wife is concerned. Wife is asking for office to call her to see if pt can be seen today this afternoon or Wednesday.  Pt needs to be seen in [place] due insurance.       Please Advise- Thank you

## 2025-03-08 ENCOUNTER — HOSPITAL ENCOUNTER (INPATIENT)
Facility: HOSPITAL | Age: 65
LOS: 2 days | Discharge: HOME | DRG: 682 | End: 2025-03-10
Attending: EMERGENCY MEDICINE | Admitting: STUDENT IN AN ORGANIZED HEALTH CARE EDUCATION/TRAINING PROGRAM
Payer: COMMERCIAL

## 2025-03-08 DIAGNOSIS — E87.1 HYPONATREMIA: ICD-10-CM

## 2025-03-08 DIAGNOSIS — R73.9 HYPERGLYCEMIA: ICD-10-CM

## 2025-03-08 DIAGNOSIS — E86.0 DEHYDRATION: ICD-10-CM

## 2025-03-08 DIAGNOSIS — N17.9 AKI (ACUTE KIDNEY INJURY): Primary | ICD-10-CM

## 2025-03-08 DIAGNOSIS — D50.0 ANEMIA, BLOOD LOSS: ICD-10-CM

## 2025-03-08 DIAGNOSIS — R07.9 CHEST PAIN: ICD-10-CM

## 2025-03-08 DIAGNOSIS — R50.9 FEVER: ICD-10-CM

## 2025-03-08 PROBLEM — N39.0 UTI (URINARY TRACT INFECTION): Status: ACTIVE | Noted: 2025-03-08

## 2025-03-08 LAB
ABO + RH BLD: NORMAL
ALBUMIN SERPL BCP-MCNC: 1.9 G/DL (ref 3.5–5.2)
ALP SERPL-CCNC: 138 U/L (ref 40–150)
ALT SERPL W/O P-5'-P-CCNC: 18 U/L (ref 10–44)
ANION GAP SERPL CALC-SCNC: 8 MMOL/L (ref 8–16)
AST SERPL-CCNC: 27 U/L (ref 10–40)
BACTERIA #/AREA URNS HPF: ABNORMAL /HPF
BASOPHILS # BLD AUTO: 0.02 K/UL (ref 0–0.2)
BASOPHILS NFR BLD: 0.3 % (ref 0–1.9)
BILIRUB SERPL-MCNC: 0.3 MG/DL (ref 0.1–1)
BILIRUB UR QL STRIP: NEGATIVE
BLD GP AB SCN CELLS X3 SERPL QL: NORMAL
BUN SERPL-MCNC: 34 MG/DL (ref 8–23)
CALCIUM SERPL-MCNC: 8.3 MG/DL (ref 8.7–10.5)
CHLORIDE SERPL-SCNC: 96 MMOL/L (ref 95–110)
CLARITY UR: ABNORMAL
CO2 SERPL-SCNC: 19 MMOL/L (ref 23–29)
COLOR UR: YELLOW
CREAT SERPL-MCNC: 1.7 MG/DL (ref 0.5–1.4)
DIFFERENTIAL METHOD BLD: ABNORMAL
EOSINOPHIL # BLD AUTO: 0 K/UL (ref 0–0.5)
EOSINOPHIL NFR BLD: 0.4 % (ref 0–8)
ERYTHROCYTE [DISTWIDTH] IN BLOOD BY AUTOMATED COUNT: 15 % (ref 11.5–14.5)
EST. GFR  (NO RACE VARIABLE): 45 ML/MIN/1.73 M^2
GLUCOSE SERPL-MCNC: 330 MG/DL (ref 70–110)
GLUCOSE UR QL STRIP: ABNORMAL
HCT VFR BLD AUTO: 21.5 % (ref 40–54)
HGB BLD-MCNC: 7 G/DL (ref 14–18)
HGB UR QL STRIP: ABNORMAL
HYALINE CASTS #/AREA URNS LPF: 0 /LPF
IMM GRANULOCYTES # BLD AUTO: 0.06 K/UL (ref 0–0.04)
IMM GRANULOCYTES NFR BLD AUTO: 0.8 % (ref 0–0.5)
INFLUENZA A, MOLECULAR: NEGATIVE
INFLUENZA B, MOLECULAR: NEGATIVE
KETONES UR QL STRIP: NEGATIVE
LACTATE SERPL-SCNC: 1.1 MMOL/L (ref 0.5–2.2)
LEUKOCYTE ESTERASE UR QL STRIP: ABNORMAL
LYMPHOCYTES # BLD AUTO: 0.3 K/UL (ref 1–4.8)
LYMPHOCYTES NFR BLD: 3.5 % (ref 18–48)
MCH RBC QN AUTO: 26.5 PG (ref 27–31)
MCHC RBC AUTO-ENTMCNC: 32.6 G/DL (ref 32–36)
MCV RBC AUTO: 81 FL (ref 82–98)
MICROSCOPIC COMMENT: ABNORMAL
MONOCYTES # BLD AUTO: 0.5 K/UL (ref 0.3–1)
MONOCYTES NFR BLD: 7 % (ref 4–15)
NEUTROPHILS # BLD AUTO: 6.3 K/UL (ref 1.8–7.7)
NEUTROPHILS NFR BLD: 88 % (ref 38–73)
NITRITE UR QL STRIP: NEGATIVE
NRBC BLD-RTO: 0 /100 WBC
PH UR STRIP: 6 [PH] (ref 5–8)
PLATELET # BLD AUTO: 220 K/UL (ref 150–450)
PMV BLD AUTO: 9.7 FL (ref 9.2–12.9)
POCT GLUCOSE: 285 MG/DL (ref 70–110)
POCT GLUCOSE: 371 MG/DL (ref 70–110)
POTASSIUM SERPL-SCNC: 4.2 MMOL/L (ref 3.5–5.1)
PROT SERPL-MCNC: 7.4 G/DL (ref 6–8.4)
PROT UR QL STRIP: ABNORMAL
RBC # BLD AUTO: 2.64 M/UL (ref 4.6–6.2)
RBC #/AREA URNS HPF: 7 /HPF (ref 0–4)
SARS-COV-2 RDRP RESP QL NAA+PROBE: NEGATIVE
SODIUM SERPL-SCNC: 123 MMOL/L (ref 136–145)
SP GR UR STRIP: 1.02 (ref 1–1.03)
SPECIMEN OUTDATE: NORMAL
SPECIMEN SOURCE: NORMAL
URN SPEC COLLECT METH UR: ABNORMAL
UROBILINOGEN UR STRIP-ACNC: NEGATIVE EU/DL
WBC # BLD AUTO: 7.13 K/UL (ref 3.9–12.7)
WBC #/AREA URNS HPF: 52 /HPF (ref 0–5)
WBC CLUMPS URNS QL MICRO: ABNORMAL
YEAST URNS QL MICRO: ABNORMAL

## 2025-03-08 PROCEDURE — 11000001 HC ACUTE MED/SURG PRIVATE ROOM

## 2025-03-08 PROCEDURE — 87635 SARS-COV-2 COVID-19 AMP PRB: CPT | Performed by: EMERGENCY MEDICINE

## 2025-03-08 PROCEDURE — 36415 COLL VENOUS BLD VENIPUNCTURE: CPT | Performed by: EMERGENCY MEDICINE

## 2025-03-08 PROCEDURE — 82962 GLUCOSE BLOOD TEST: CPT

## 2025-03-08 PROCEDURE — 25500020 PHARM REV CODE 255

## 2025-03-08 PROCEDURE — 96374 THER/PROPH/DIAG INJ IV PUSH: CPT

## 2025-03-08 PROCEDURE — 96375 TX/PRO/DX INJ NEW DRUG ADDON: CPT

## 2025-03-08 PROCEDURE — 25000003 PHARM REV CODE 250: Performed by: EMERGENCY MEDICINE

## 2025-03-08 PROCEDURE — 80053 COMPREHEN METABOLIC PANEL: CPT | Performed by: EMERGENCY MEDICINE

## 2025-03-08 PROCEDURE — 99292 CRITICAL CARE ADDL 30 MIN: CPT

## 2025-03-08 PROCEDURE — 81000 URINALYSIS NONAUTO W/SCOPE: CPT | Performed by: EMERGENCY MEDICINE

## 2025-03-08 PROCEDURE — 30233N1 TRANSFUSION OF NONAUTOLOGOUS RED BLOOD CELLS INTO PERIPHERAL VEIN, PERCUTANEOUS APPROACH: ICD-10-PCS | Performed by: STUDENT IN AN ORGANIZED HEALTH CARE EDUCATION/TRAINING PROGRAM

## 2025-03-08 PROCEDURE — 85025 COMPLETE CBC W/AUTO DIFF WBC: CPT | Performed by: EMERGENCY MEDICINE

## 2025-03-08 PROCEDURE — 99291 CRITICAL CARE FIRST HOUR: CPT

## 2025-03-08 PROCEDURE — 87086 URINE CULTURE/COLONY COUNT: CPT | Performed by: EMERGENCY MEDICINE

## 2025-03-08 PROCEDURE — 87502 INFLUENZA DNA AMP PROBE: CPT | Performed by: EMERGENCY MEDICINE

## 2025-03-08 PROCEDURE — 83605 ASSAY OF LACTIC ACID: CPT | Performed by: EMERGENCY MEDICINE

## 2025-03-08 PROCEDURE — 86920 COMPATIBILITY TEST SPIN: CPT | Performed by: NURSE PRACTITIONER

## 2025-03-08 PROCEDURE — 63600175 PHARM REV CODE 636 W HCPCS: Performed by: EMERGENCY MEDICINE

## 2025-03-08 PROCEDURE — 86850 RBC ANTIBODY SCREEN: CPT | Performed by: EMERGENCY MEDICINE

## 2025-03-08 RX ORDER — SODIUM,POTASSIUM PHOSPHATES 280-250MG
2 POWDER IN PACKET (EA) ORAL
Status: DISCONTINUED | OUTPATIENT
Start: 2025-03-09 | End: 2025-03-10 | Stop reason: HOSPADM

## 2025-03-08 RX ORDER — ONDANSETRON HYDROCHLORIDE 2 MG/ML
8 INJECTION, SOLUTION INTRAVENOUS EVERY 6 HOURS PRN
Status: DISCONTINUED | OUTPATIENT
Start: 2025-03-09 | End: 2025-03-10 | Stop reason: HOSPADM

## 2025-03-08 RX ORDER — METOPROLOL SUCCINATE 25 MG/1
25 TABLET, EXTENDED RELEASE ORAL DAILY
Status: DISCONTINUED | OUTPATIENT
Start: 2025-03-09 | End: 2025-03-10 | Stop reason: HOSPADM

## 2025-03-08 RX ORDER — MORPHINE SULFATE 10 MG/ML
10 INJECTION INTRAMUSCULAR; INTRAVENOUS; SUBCUTANEOUS
Status: COMPLETED | OUTPATIENT
Start: 2025-03-08 | End: 2025-03-08

## 2025-03-08 RX ORDER — INSULIN ASPART 100 [IU]/ML
25 INJECTION, SOLUTION INTRAVENOUS; SUBCUTANEOUS 3 TIMES DAILY
Status: DISCONTINUED | OUTPATIENT
Start: 2025-03-09 | End: 2025-03-09 | Stop reason: SDUPTHER

## 2025-03-08 RX ORDER — CEFTRIAXONE 1 G/1
1 INJECTION, POWDER, FOR SOLUTION INTRAMUSCULAR; INTRAVENOUS
Status: DISCONTINUED | OUTPATIENT
Start: 2025-03-09 | End: 2025-03-10 | Stop reason: HOSPADM

## 2025-03-08 RX ORDER — IBUPROFEN 200 MG
24 TABLET ORAL
Status: DISCONTINUED | OUTPATIENT
Start: 2025-03-09 | End: 2025-03-10 | Stop reason: HOSPADM

## 2025-03-08 RX ORDER — ACETAMINOPHEN 325 MG/1
650 TABLET ORAL EVERY 8 HOURS PRN
Status: DISCONTINUED | OUTPATIENT
Start: 2025-03-09 | End: 2025-03-09

## 2025-03-08 RX ORDER — ACETAMINOPHEN 325 MG/1
650 TABLET ORAL EVERY 4 HOURS PRN
Status: DISCONTINUED | OUTPATIENT
Start: 2025-03-09 | End: 2025-03-10 | Stop reason: HOSPADM

## 2025-03-08 RX ORDER — METHOCARBAMOL 500 MG/1
1000 TABLET, FILM COATED ORAL 4 TIMES DAILY PRN
Status: DISCONTINUED | OUTPATIENT
Start: 2025-03-09 | End: 2025-03-10 | Stop reason: HOSPADM

## 2025-03-08 RX ORDER — INSULIN ASPART 100 [IU]/ML
25 INJECTION, SOLUTION INTRAVENOUS; SUBCUTANEOUS 3 TIMES DAILY
COMMUNITY

## 2025-03-08 RX ORDER — HYDROMORPHONE HYDROCHLORIDE 1 MG/ML
1 INJECTION, SOLUTION INTRAMUSCULAR; INTRAVENOUS; SUBCUTANEOUS EVERY 4 HOURS PRN
Refills: 0 | Status: DISCONTINUED | OUTPATIENT
Start: 2025-03-09 | End: 2025-03-10 | Stop reason: HOSPADM

## 2025-03-08 RX ORDER — INSULIN ASPART 100 [IU]/ML
0-10 INJECTION, SOLUTION INTRAVENOUS; SUBCUTANEOUS
Status: DISCONTINUED | OUTPATIENT
Start: 2025-03-09 | End: 2025-03-10 | Stop reason: HOSPADM

## 2025-03-08 RX ORDER — METHOCARBAMOL 500 MG/1
1000 TABLET, FILM COATED ORAL 4 TIMES DAILY PRN
COMMUNITY
Start: 2025-02-25

## 2025-03-08 RX ORDER — ALUMINUM HYDROXIDE, MAGNESIUM HYDROXIDE, AND SIMETHICONE 1200; 120; 1200 MG/30ML; MG/30ML; MG/30ML
30 SUSPENSION ORAL 4 TIMES DAILY PRN
Status: DISCONTINUED | OUTPATIENT
Start: 2025-03-09 | End: 2025-03-10 | Stop reason: HOSPADM

## 2025-03-08 RX ORDER — TALC
9 POWDER (GRAM) TOPICAL NIGHTLY PRN
Status: DISCONTINUED | OUTPATIENT
Start: 2025-03-09 | End: 2025-03-10 | Stop reason: HOSPADM

## 2025-03-08 RX ORDER — CEFTRIAXONE 1 G/1
1 INJECTION, POWDER, FOR SOLUTION INTRAMUSCULAR; INTRAVENOUS
Status: COMPLETED | OUTPATIENT
Start: 2025-03-08 | End: 2025-03-08

## 2025-03-08 RX ORDER — BUPRENORPHINE AND NALOXONE 8; 2 MG/1; MG/1
FILM, SOLUBLE BUCCAL; SUBLINGUAL
COMMUNITY
Start: 2025-03-05

## 2025-03-08 RX ORDER — AMLODIPINE BESYLATE 5 MG/1
10 TABLET ORAL DAILY
Status: DISCONTINUED | OUTPATIENT
Start: 2025-03-09 | End: 2025-03-10

## 2025-03-08 RX ORDER — GLUCAGON 1 MG
1 KIT INJECTION
Status: DISCONTINUED | OUTPATIENT
Start: 2025-03-09 | End: 2025-03-10 | Stop reason: HOSPADM

## 2025-03-08 RX ORDER — FLECAINIDE ACETATE 50 MG/1
50 TABLET ORAL 2 TIMES DAILY
COMMUNITY
Start: 2025-02-14

## 2025-03-08 RX ORDER — MORPHINE SULFATE 4 MG/ML
4 INJECTION, SOLUTION INTRAMUSCULAR; INTRAVENOUS EVERY 4 HOURS PRN
Status: DISCONTINUED | OUTPATIENT
Start: 2025-03-09 | End: 2025-03-09 | Stop reason: SDUPTHER

## 2025-03-08 RX ORDER — LANOLIN ALCOHOL/MO/W.PET/CERES
800 CREAM (GRAM) TOPICAL
Status: DISCONTINUED | OUTPATIENT
Start: 2025-03-09 | End: 2025-03-10 | Stop reason: HOSPADM

## 2025-03-08 RX ORDER — TAMSULOSIN HYDROCHLORIDE 0.4 MG/1
0.4 CAPSULE ORAL
COMMUNITY
Start: 2025-02-25

## 2025-03-08 RX ORDER — FLECAINIDE ACETATE 50 MG/1
50 TABLET ORAL 2 TIMES DAILY
Status: DISCONTINUED | OUTPATIENT
Start: 2025-03-09 | End: 2025-03-10 | Stop reason: HOSPADM

## 2025-03-08 RX ORDER — SIMETHICONE 80 MG
1 TABLET,CHEWABLE ORAL 4 TIMES DAILY PRN
Status: DISCONTINUED | OUTPATIENT
Start: 2025-03-09 | End: 2025-03-10 | Stop reason: HOSPADM

## 2025-03-08 RX ORDER — HYDROCODONE BITARTRATE AND ACETAMINOPHEN 500; 5 MG/1; MG/1
TABLET ORAL
Status: DISCONTINUED | OUTPATIENT
Start: 2025-03-09 | End: 2025-03-10 | Stop reason: HOSPADM

## 2025-03-08 RX ORDER — TAMSULOSIN HYDROCHLORIDE 0.4 MG/1
0.4 CAPSULE ORAL DAILY
Status: DISCONTINUED | OUTPATIENT
Start: 2025-03-09 | End: 2025-03-10 | Stop reason: HOSPADM

## 2025-03-08 RX ORDER — SULFAMETHOXAZOLE AND TRIMETHOPRIM 800; 160 MG/1; MG/1
1 TABLET ORAL 2 TIMES DAILY
Status: ON HOLD | COMMUNITY
Start: 2025-03-07 | End: 2025-03-10 | Stop reason: HOSPADM

## 2025-03-08 RX ORDER — IBUPROFEN 200 MG
16 TABLET ORAL
Status: DISCONTINUED | OUTPATIENT
Start: 2025-03-09 | End: 2025-03-10 | Stop reason: HOSPADM

## 2025-03-08 RX ORDER — SODIUM CHLORIDE 9 MG/ML
INJECTION, SOLUTION INTRAVENOUS
Status: COMPLETED | OUTPATIENT
Start: 2025-03-08 | End: 2025-03-08

## 2025-03-08 RX ORDER — SODIUM CHLORIDE 9 MG/ML
INJECTION, SOLUTION INTRAVENOUS CONTINUOUS
Status: DISCONTINUED | OUTPATIENT
Start: 2025-03-09 | End: 2025-03-10 | Stop reason: HOSPADM

## 2025-03-08 RX ORDER — SODIUM CHLORIDE 0.9 % (FLUSH) 0.9 %
3 SYRINGE (ML) INJECTION EVERY 12 HOURS PRN
Status: DISCONTINUED | OUTPATIENT
Start: 2025-03-09 | End: 2025-03-10 | Stop reason: HOSPADM

## 2025-03-08 RX ORDER — NALOXONE HCL 0.4 MG/ML
0.02 VIAL (ML) INJECTION
Status: DISCONTINUED | OUTPATIENT
Start: 2025-03-09 | End: 2025-03-10 | Stop reason: HOSPADM

## 2025-03-08 RX ADMIN — SODIUM CHLORIDE: 9 INJECTION, SOLUTION INTRAVENOUS at 10:03

## 2025-03-08 RX ADMIN — HUMAN INSULIN 8 UNITS: 100 INJECTION, SOLUTION SUBCUTANEOUS at 10:03

## 2025-03-08 RX ADMIN — MORPHINE SULFATE 10 MG: 10 INJECTION, SOLUTION INTRAMUSCULAR; INTRAVENOUS at 10:03

## 2025-03-08 RX ADMIN — IOHEXOL 75 ML: 350 INJECTION, SOLUTION INTRAVENOUS at 09:03

## 2025-03-08 RX ADMIN — CEFTRIAXONE SODIUM 1 G: 1 INJECTION, POWDER, FOR SOLUTION INTRAMUSCULAR; INTRAVENOUS at 11:03

## 2025-03-09 LAB
ALBUMIN SERPL BCP-MCNC: 2 G/DL (ref 3.5–5.2)
ALP SERPL-CCNC: 144 U/L (ref 40–150)
ALT SERPL W/O P-5'-P-CCNC: 21 U/L (ref 10–44)
ANION GAP SERPL CALC-SCNC: 10 MMOL/L (ref 8–16)
AST SERPL-CCNC: 25 U/L (ref 10–40)
BASOPHILS # BLD AUTO: 0.02 K/UL (ref 0–0.2)
BASOPHILS NFR BLD: 0.3 % (ref 0–1.9)
BILIRUB SERPL-MCNC: 0.5 MG/DL (ref 0.1–1)
BLD PROD TYP BPU: NORMAL
BLOOD UNIT EXPIRATION DATE: NORMAL
BLOOD UNIT TYPE CODE: 6200
BLOOD UNIT TYPE: NORMAL
BUN SERPL-MCNC: 24 MG/DL (ref 8–23)
CALCIUM SERPL-MCNC: 9 MG/DL (ref 8.7–10.5)
CHLORIDE SERPL-SCNC: 101 MMOL/L (ref 95–110)
CO2 SERPL-SCNC: 19 MMOL/L (ref 23–29)
CODING SYSTEM: NORMAL
CREAT SERPL-MCNC: 1.3 MG/DL (ref 0.5–1.4)
CREAT UR-MCNC: 62.6 MG/DL (ref 23–375)
CROSSMATCH INTERPRETATION: NORMAL
DIFFERENTIAL METHOD BLD: ABNORMAL
DISPENSE STATUS: NORMAL
EOSINOPHIL # BLD AUTO: 0 K/UL (ref 0–0.5)
EOSINOPHIL NFR BLD: 0.6 % (ref 0–8)
ERYTHROCYTE [DISTWIDTH] IN BLOOD BY AUTOMATED COUNT: 15.3 % (ref 11.5–14.5)
ERYTHROCYTE [DISTWIDTH] IN BLOOD BY AUTOMATED COUNT: 15.6 % (ref 11.5–14.5)
ERYTHROCYTE [DISTWIDTH] IN BLOOD BY AUTOMATED COUNT: 15.6 % (ref 11.5–14.5)
EST. GFR  (NO RACE VARIABLE): >60 ML/MIN/1.73 M^2
FERRITIN SERPL-MCNC: 2116 NG/ML (ref 20–300)
GLUCOSE SERPL-MCNC: 274 MG/DL (ref 70–110)
HCT VFR BLD AUTO: 24.9 % (ref 40–54)
HCT VFR BLD AUTO: 25.6 % (ref 40–54)
HCT VFR BLD AUTO: 26.3 % (ref 40–54)
HGB BLD-MCNC: 8.2 G/DL (ref 14–18)
HGB BLD-MCNC: 8.3 G/DL (ref 14–18)
HGB BLD-MCNC: 8.5 G/DL (ref 14–18)
IMM GRANULOCYTES # BLD AUTO: 0.04 K/UL (ref 0–0.04)
IMM GRANULOCYTES NFR BLD AUTO: 0.6 % (ref 0–0.5)
IRON SERPL-MCNC: 31 UG/DL (ref 45–160)
LYMPHOCYTES # BLD AUTO: 0.5 K/UL (ref 1–4.8)
LYMPHOCYTES NFR BLD: 6.8 % (ref 18–48)
MAGNESIUM SERPL-MCNC: 1.9 MG/DL (ref 1.6–2.6)
MCH RBC QN AUTO: 26.3 PG (ref 27–31)
MCH RBC QN AUTO: 26.4 PG (ref 27–31)
MCH RBC QN AUTO: 26.8 PG (ref 27–31)
MCHC RBC AUTO-ENTMCNC: 32.3 G/DL (ref 32–36)
MCHC RBC AUTO-ENTMCNC: 32.4 G/DL (ref 32–36)
MCHC RBC AUTO-ENTMCNC: 32.9 G/DL (ref 32–36)
MCV RBC AUTO: 81 FL (ref 82–98)
MCV RBC AUTO: 81 FL (ref 82–98)
MCV RBC AUTO: 82 FL (ref 82–98)
MONOCYTES # BLD AUTO: 0.4 K/UL (ref 0.3–1)
MONOCYTES NFR BLD: 5.2 % (ref 4–15)
NEUTROPHILS # BLD AUTO: 6.1 K/UL (ref 1.8–7.7)
NEUTROPHILS NFR BLD: 86.5 % (ref 38–73)
NRBC BLD-RTO: 0 /100 WBC
NUM UNITS TRANS PACKED RBC: NORMAL
OSMOLALITY SERPL: 293 MOSM/KG (ref 280–300)
OSMOLALITY UR: 408 MOSM/KG (ref 50–1200)
PHOSPHATE SERPL-MCNC: 2.9 MG/DL (ref 2.7–4.5)
PLATELET # BLD AUTO: 202 K/UL (ref 150–450)
PLATELET # BLD AUTO: 205 K/UL (ref 150–450)
PLATELET # BLD AUTO: 207 K/UL (ref 150–450)
PMV BLD AUTO: 8.8 FL (ref 9.2–12.9)
PMV BLD AUTO: 9.2 FL (ref 9.2–12.9)
PMV BLD AUTO: 9.4 FL (ref 9.2–12.9)
POCT GLUCOSE: 130 MG/DL (ref 70–110)
POCT GLUCOSE: 217 MG/DL (ref 70–110)
POCT GLUCOSE: 305 MG/DL (ref 70–110)
POCT GLUCOSE: 65 MG/DL (ref 70–110)
POTASSIUM SERPL-SCNC: 4.6 MMOL/L (ref 3.5–5.1)
PROT SERPL-MCNC: 7.6 G/DL (ref 6–8.4)
RBC # BLD AUTO: 3.06 M/UL (ref 4.6–6.2)
RBC # BLD AUTO: 3.15 M/UL (ref 4.6–6.2)
RBC # BLD AUTO: 3.22 M/UL (ref 4.6–6.2)
SATURATED IRON: 18 % (ref 20–50)
SODIUM SERPL-SCNC: 130 MMOL/L (ref 136–145)
SODIUM SERPL-SCNC: 130 MMOL/L (ref 136–145)
SODIUM SERPL-SCNC: 132 MMOL/L (ref 136–145)
SODIUM UR-SCNC: 57 MMOL/L (ref 20–250)
TOTAL IRON BINDING CAPACITY: 169 UG/DL (ref 250–450)
TRANSFERRIN SERPL-MCNC: 121 MG/DL (ref 200–375)
WBC # BLD AUTO: 6.12 K/UL (ref 3.9–12.7)
WBC # BLD AUTO: 7.05 K/UL (ref 3.9–12.7)
WBC # BLD AUTO: 7.54 K/UL (ref 3.9–12.7)

## 2025-03-09 PROCEDURE — 84100 ASSAY OF PHOSPHORUS: CPT | Performed by: NURSE PRACTITIONER

## 2025-03-09 PROCEDURE — 63600175 PHARM REV CODE 636 W HCPCS: Performed by: STUDENT IN AN ORGANIZED HEALTH CARE EDUCATION/TRAINING PROGRAM

## 2025-03-09 PROCEDURE — 25000003 PHARM REV CODE 250: Performed by: STUDENT IN AN ORGANIZED HEALTH CARE EDUCATION/TRAINING PROGRAM

## 2025-03-09 PROCEDURE — 85027 COMPLETE CBC AUTOMATED: CPT | Mod: 91 | Performed by: UROLOGY

## 2025-03-09 PROCEDURE — 25000003 PHARM REV CODE 250: Performed by: NURSE PRACTITIONER

## 2025-03-09 PROCEDURE — 83935 ASSAY OF URINE OSMOLALITY: CPT | Performed by: NURSE PRACTITIONER

## 2025-03-09 PROCEDURE — 85025 COMPLETE CBC W/AUTO DIFF WBC: CPT | Performed by: NURSE PRACTITIONER

## 2025-03-09 PROCEDURE — 83930 ASSAY OF BLOOD OSMOLALITY: CPT | Performed by: NURSE PRACTITIONER

## 2025-03-09 PROCEDURE — 36430 TRANSFUSION BLD/BLD COMPNT: CPT

## 2025-03-09 PROCEDURE — 84300 ASSAY OF URINE SODIUM: CPT | Performed by: NURSE PRACTITIONER

## 2025-03-09 PROCEDURE — 82728 ASSAY OF FERRITIN: CPT | Performed by: STUDENT IN AN ORGANIZED HEALTH CARE EDUCATION/TRAINING PROGRAM

## 2025-03-09 PROCEDURE — 80053 COMPREHEN METABOLIC PANEL: CPT | Performed by: NURSE PRACTITIONER

## 2025-03-09 PROCEDURE — 82570 ASSAY OF URINE CREATININE: CPT | Performed by: STUDENT IN AN ORGANIZED HEALTH CARE EDUCATION/TRAINING PROGRAM

## 2025-03-09 PROCEDURE — 63600175 PHARM REV CODE 636 W HCPCS: Performed by: NURSE PRACTITIONER

## 2025-03-09 PROCEDURE — 83735 ASSAY OF MAGNESIUM: CPT | Performed by: NURSE PRACTITIONER

## 2025-03-09 PROCEDURE — 99449 NTRPROF PH1/NTRNET/EHR 31/>: CPT | Mod: ,,, | Performed by: UROLOGY

## 2025-03-09 PROCEDURE — 83540 ASSAY OF IRON: CPT | Performed by: STUDENT IN AN ORGANIZED HEALTH CARE EDUCATION/TRAINING PROGRAM

## 2025-03-09 PROCEDURE — 11000001 HC ACUTE MED/SURG PRIVATE ROOM

## 2025-03-09 PROCEDURE — P9016 RBC LEUKOCYTES REDUCED: HCPCS | Performed by: NURSE PRACTITIONER

## 2025-03-09 PROCEDURE — 84295 ASSAY OF SERUM SODIUM: CPT | Performed by: STUDENT IN AN ORGANIZED HEALTH CARE EDUCATION/TRAINING PROGRAM

## 2025-03-09 PROCEDURE — 36415 COLL VENOUS BLD VENIPUNCTURE: CPT | Performed by: STUDENT IN AN ORGANIZED HEALTH CARE EDUCATION/TRAINING PROGRAM

## 2025-03-09 RX ORDER — HYDROCODONE BITARTRATE AND ACETAMINOPHEN 5; 325 MG/1; MG/1
1 TABLET ORAL EVERY 6 HOURS PRN
Status: DISCONTINUED | OUTPATIENT
Start: 2025-03-09 | End: 2025-03-10 | Stop reason: HOSPADM

## 2025-03-09 RX ORDER — INSULIN GLARGINE 100 [IU]/ML
40 INJECTION, SOLUTION SUBCUTANEOUS DAILY
Status: DISCONTINUED | OUTPATIENT
Start: 2025-03-09 | End: 2025-03-10 | Stop reason: HOSPADM

## 2025-03-09 RX ORDER — INSULIN ASPART 100 [IU]/ML
25 INJECTION, SOLUTION INTRAVENOUS; SUBCUTANEOUS 3 TIMES DAILY
Status: DISCONTINUED | OUTPATIENT
Start: 2025-03-09 | End: 2025-03-10 | Stop reason: HOSPADM

## 2025-03-09 RX ADMIN — FLECAINIDE ACETATE 50 MG: 50 TABLET ORAL at 08:03

## 2025-03-09 RX ADMIN — SIMETHICONE 80 MG: 80 TABLET, CHEWABLE ORAL at 04:03

## 2025-03-09 RX ADMIN — ACETAMINOPHEN 650 MG: 325 TABLET ORAL at 08:03

## 2025-03-09 RX ADMIN — HYDROMORPHONE HYDROCHLORIDE 1 MG: 1 INJECTION, SOLUTION INTRAMUSCULAR; INTRAVENOUS; SUBCUTANEOUS at 08:03

## 2025-03-09 RX ADMIN — INSULIN ASPART 25 UNITS: 100 INJECTION, SOLUTION INTRAVENOUS; SUBCUTANEOUS at 08:03

## 2025-03-09 RX ADMIN — MORPHINE SULFATE 4 MG: 4 INJECTION INTRAVENOUS at 06:03

## 2025-03-09 RX ADMIN — HYDROCODONE BITARTRATE AND ACETAMINOPHEN 1 TABLET: 5; 325 TABLET ORAL at 05:03

## 2025-03-09 RX ADMIN — TAMSULOSIN HYDROCHLORIDE 0.4 MG: 0.4 CAPSULE ORAL at 08:03

## 2025-03-09 RX ADMIN — SODIUM CHLORIDE: 9 INJECTION, SOLUTION INTRAVENOUS at 12:03

## 2025-03-09 RX ADMIN — METHOCARBAMOL 1000 MG: 500 TABLET ORAL at 04:03

## 2025-03-09 RX ADMIN — INSULIN ASPART 8 UNITS: 100 INJECTION, SOLUTION INTRAVENOUS; SUBCUTANEOUS at 08:03

## 2025-03-09 RX ADMIN — INSULIN ASPART 4 UNITS: 100 INJECTION, SOLUTION INTRAVENOUS; SUBCUTANEOUS at 05:03

## 2025-03-09 RX ADMIN — HYDROMORPHONE HYDROCHLORIDE 1 MG: 1 INJECTION, SOLUTION INTRAMUSCULAR; INTRAVENOUS; SUBCUTANEOUS at 12:03

## 2025-03-09 RX ADMIN — ALUMINUM HYDROXIDE, MAGNESIUM HYDROXIDE, AND SIMETHICONE 30 ML: 1200; 120; 1200 SUSPENSION ORAL at 08:03

## 2025-03-09 RX ADMIN — METOPROLOL SUCCINATE 25 MG: 25 TABLET, EXTENDED RELEASE ORAL at 08:03

## 2025-03-09 RX ADMIN — INSULIN ASPART 25 UNITS: 100 INJECTION, SOLUTION INTRAVENOUS; SUBCUTANEOUS at 05:03

## 2025-03-09 RX ADMIN — CEFTRIAXONE SODIUM 1 G: 1 INJECTION, POWDER, FOR SOLUTION INTRAMUSCULAR; INTRAVENOUS at 09:03

## 2025-03-09 NOTE — ASSESSMENT & PLAN NOTE
Hyponatremia is likely due to Dehydration/hypovolemia. The patient's most recent sodium results are listed below.  Recent Labs     03/08/25 2056   *     Plan  - Correct the sodium by 4-6mEq in 24 hours.   - Obtain the following studies: Urine sodium, urine osmolality, serum osmolality.  - Will treat the hyponatremia with IV fluids as follows: NS @100/hr  - Monitor sodium Daily.   - Patient hyponatremia is stable  Decreased PO intake, hypovolemia likely cause  -

## 2025-03-09 NOTE — ASSESSMENT & PLAN NOTE
Patient's FSGs are controlled on current medication regimen.  Last A1c reviewed-   Lab Results   Component Value Date    LABA1C 10.7 (H) 06/27/2018    HGBA1C 10.1 (H) 01/24/2025     Most recent fingerstick glucose reviewed-   Recent Labs   Lab 03/09/25  1109 03/09/25  1701 03/09/25  2019 03/10/25  0752   POCTGLUCOSE 65* 217* 130* 165*     Current correctional scale  Low  Maintain anti-hyperglycemic dose as follows-   Antihyperglycemics (From admission, onward)      Start     Stop Route Frequency Ordered    03/09/25 0930  insulin glargine U-100 (Lantus) pen 40 Units         -- SubQ Daily 03/09/25 0819    03/09/25 0900  insulin aspart U-100 pen 25 Units         -- SubQ 3 times daily 03/09/25 0115    03/09/25 0002  insulin aspart U-100 pen 0-10 Units         -- SubQ Before meals & nightly PRN 03/08/25 2303          Hold Oral hypoglycemics while patient is in the hospital.

## 2025-03-09 NOTE — ED PROVIDER NOTES
Encounter Date: 3/8/2025       History     Chief Complaint   Patient presents with    Fever     For 3 days, up to 103     Chief complaint: Fever    HPI: 64-year-old male presents with a 3 day history of fever.  He reports that he has had persistent flank pain since he recently was hospitalized for embolization of an artery secondary to a perinephric hematoma.  He denies any gross hematuria and reports postvoid discomfort.  He denies any headache or neck stiffness and has no cough or shortness of breath.  He has rhinorrhea which she attributes to Flomax treatment.  He denies any nausea, vomiting or diarrhea.  He has had myalgias.  Past medical history is also significant for hypertension, hyperlipidemia, diabetes, GERD and hip fracture.      Review of patient's allergies indicates:   Allergen Reactions    Nubain [nalbuphine] Anaphylaxis    Ativan [lorazepam] Other (See Comments)     Climbs wall       Past Medical History:   Diagnosis Date    Diabetes mellitus     GERD (gastroesophageal reflux disease)     Hip discomfort     Hip fracture     Hyperlipidemia     Hypertension     Pancreas disorder     Spine pain      Past Surgical History:   Procedure Laterality Date    ANGIOGRAM, RENAL, BILATERAL, SELECTIVE, WITH S&I Bilateral 1/23/2025    Procedure: ANGIOGRAM, RENAL, BILATERAL, SELECTIVE, WITH S&I;  Surgeon: Celeste Win;  Location: [facility];  Service: Anesthesiology;  Laterality: Bilateral;    FRACTURE SURGERY       No family history on file.  Social History[1]  Review of Systems   Constitutional:  Positive for fever.   HENT:  Positive for rhinorrhea. Negative for congestion and sore throat.    Eyes:  Negative for visual disturbance.   Respiratory:  Negative for apnea, cough and shortness of breath.    Cardiovascular:  Negative for chest pain and palpitations.   Gastrointestinal:  Negative for abdominal distention, abdominal pain, diarrhea, nausea and vomiting.   Genitourinary:  Positive for dysuria and flank pain.  Negative for difficulty urinating.   Musculoskeletal:  Positive for myalgias. Negative for neck pain.   Skin:  Negative for pallor and rash.   Neurological:  Negative for headaches.   Hematological:  Does not bruise/bleed easily.   Psychiatric/Behavioral:  Negative for agitation.        Physical Exam     Initial Vitals [03/08/25 2000]   BP Pulse Resp Temp SpO2   (!) 118/56 81 18 99.8 °F (37.7 °C) 98 %      MAP       --         Physical Exam    Nursing note and vitals reviewed.  Constitutional: Vital signs are normal. He appears well-developed and well-nourished.   HENT:   Head: Normocephalic and atraumatic. Mouth/Throat: Oropharynx is clear and moist. No oropharyngeal exudate.   Eyes: Conjunctivae are normal.   Neck: Trachea normal and phonation normal.   Cardiovascular:  Normal rate, regular rhythm and normal heart sounds.     Exam reveals no gallop and no friction rub.       No murmur heard.  Pulmonary/Chest: Breath sounds normal. No respiratory distress. He has no wheezes. He has no rhonchi. He has no rales.   Abdominal: Abdomen is soft. He exhibits no distension. There is no abdominal tenderness. There is no rebound and no guarding.     Lymphadenopathy:     He has no cervical adenopathy.   Neurological: He is alert.   Skin: Skin is warm and dry.   Psychiatric: He has a normal mood and affect. His speech is normal.         ED Course   Critical Care    Date/Time: 3/8/2025 7:49 PM    Performed by: Dean Olmstead III, MD  Authorized by: Dean Olmstead III, MD  Direct patient critical care time: 120 minutes  Total critical care time (exclusive of procedural time) : 120 minutes  Critical care was necessary to treat or prevent imminent or life-threatening deterioration of the following conditions: metabolic crisis.  Critical care was time spent personally by me on the following activities: discussions with primary provider, evaluation of patient's response to treatment, examination of patient, obtaining history  from patient or surrogate, ordering and performing treatments and interventions, ordering and review of laboratory studies, ordering and review of radiographic studies, pulse oximetry, re-evaluation of patient's condition and review of old charts.        Labs Reviewed   CBC W/ AUTO DIFFERENTIAL - Abnormal       Result Value    WBC 7.13      RBC 2.64 (*)     Hemoglobin 7.0 (*)     Hematocrit 21.5 (*)     MCV 81 (*)     MCH 26.5 (*)     MCHC 32.6      RDW 15.0 (*)     Platelets 220      MPV 9.7      Immature Granulocytes 0.8 (*)     Gran # (ANC) 6.3      Immature Grans (Abs) 0.06 (*)     Lymph # 0.3 (*)     Mono # 0.5      Eos # 0.0      Baso # 0.02      nRBC 0      Gran % 88.0 (*)     Lymph % 3.5 (*)     Mono % 7.0      Eosinophil % 0.4      Basophil % 0.3      Differential Method Automated     COMPREHENSIVE METABOLIC PANEL - Abnormal    Sodium 123 (*)     Potassium 4.2      Chloride 96      CO2 19 (*)     Glucose 330 (*)     BUN 34 (*)     Creatinine 1.7 (*)     Calcium 8.3 (*)     Total Protein 7.4      Albumin 1.9 (*)     Total Bilirubin 0.3      Alkaline Phosphatase 138      AST 27      ALT 18      eGFR 45 (*)     Anion Gap 8      Narrative:     sodium   critical result(s) called and verbal readback obtained from   lissette harrison by CPW1 03/08/2025 21:39   URINALYSIS, REFLEX TO URINE CULTURE - Abnormal    Specimen UA Urine, Clean Catch      Color, UA Yellow      Appearance, UA Hazy (*)     pH, UA 6.0      Specific Gravity, UA 1.020      Protein, UA 2+ (*)     Glucose, UA 4+ (*)     Ketones, UA Negative      Bilirubin (UA) Negative      Occult Blood UA 3+ (*)     Nitrite, UA Negative      Urobilinogen, UA Negative      Leukocytes, UA 3+ (*)     Narrative:     Specimen Source->Urine   URINALYSIS MICROSCOPIC - Abnormal    RBC, UA 7 (*)     WBC, UA 52 (*)     WBC Clumps, UA Few (*)     Bacteria Moderate (*)     Yeast, UA None      Hyaline Casts, UA 0      Microscopic Comment SEE COMMENT      Narrative:     Specimen  Source->Urine   POCT GLUCOSE - Abnormal    POCT Glucose 371 (*)    POCT GLUCOSE - Abnormal    POCT Glucose 285 (*)    INFLUENZA A & B BY MOLECULAR    Influenza A, Molecular Negative      Influenza B, Molecular Negative      Flu A & B Source Nasal swab     CULTURE, URINE   SARS-COV-2 RNA AMPLIFICATION, QUAL    SARS-CoV-2 RNA, Amplification, Qual Negative     LACTIC ACID, PLASMA    Lactate (Lactic Acid) 1.1     TYPE & SCREEN    Specimen Outdate 03/11/2025 23:59     PREPARE RBC SOFT          Imaging Results              CT Abdomen Pelvis With IV Contrast NO Oral Contrast (Final result)  Result time 03/08/25 22:19:17      Final result by Bj Up DO (03/08/25 22:19:17)                   Impression:      1. Interval evolutional changes of a right renal subcapsular hematoma and midline retroperitoneal hematoma, similar in size to the prior study.  2. Continued mass effect on the right kidney, with stable hypodensities in the right kidney superior pole.  3. Nonspecific gallbladder wall thickening, likely reactive.  No cholelithiasis.      Electronically signed by: Bj Up  Date:    03/08/2025  Time:    22:19               Narrative:    EXAMINATION:  CT ABDOMEN PELVIS WITH IV CONTRAST    CLINICAL HISTORY:  Flank pain, kidney stone suspected;    TECHNIQUE:  Axial CT images with sagittal and coronal reformats were obtained of the abdomen and pelvis from the hemidiaphragms through the symphysis pubis after the administration of 75mL Omnipaque 350.    COMPARISON:  CTA abdomen and pelvis from 01/24/2025.    FINDINGS:  Lung Bases: Clear.    Heart: Heart size is normal.  No pericardial effusion.    Liver: The liver is enlarged and demonstrates homogeneous enhancement without focal lesion.  The portal vasculature is patent.    Biliary tract: No intrahepatic or extrahepatic biliary ductal dilatation.    Gallbladder: Stable nonspecific wall thickening of the gallbladder.  There is no gallstone.    Pancreas: Severe  atrophy or absence of the pancreatic body and tail segments.  The pancreatic head and neck are unremarkable.  No pancreatic ductal dilatation.    Spleen: Normal size without focal lesion.    Adrenals: Unremarkable.    Kidneys and urinary collecting systems: There is a large evolving right kidney subcapsular hematoma which extends into the midline of the abdomen.  Overall, the hematoma is similar in size when compared with the prior study dated 01/24/2025 and demonstrates interval expected evolutional changes, with development of hypoattenuation.  Several small peripheral hypodensities in the right kidney, predominantly in the superior pole, similar to the prior study.  The left kidney is unremarkable.  There is no hydronephrosis or nephrolithiasis.    Lymph nodes: None enlarged.    Stomach and bowel: The stomach is normal.  Loops of small and large bowel are normal in caliber without evidence for inflammation or obstruction.  The appendix is normal.  There is a moderate volume of stool, correlate for constipation.    Peritoneum and mesentery: No ascites or free intraperitoneal air.  No abdominal fluid collection.    Vasculature: No aneurysm or significant atherosclerosis.  There are postop changes of right testicular artery embolization.    Urinary bladder: There is urinary bladder wall thickening.    Reproductive organs: The prostate is not enlarged.    Body/chest wall: There is bilateral gynecomastia.  There is a left inguinal hernia containing mesenteric fat and trace fluid    Musculoskeletal: No aggressive osseous lesion.  Postop changes of the left sacroiliac joint.  There are degenerative changes.  There is a remote nonunited fracture of the left inferior pubic ramus.  There are several remote left-sided transverse process fractures.                                       X-Ray Chest PA And Lateral (Final result)  Result time 03/08/25 21:01:16      Final result by Bj Up DO (03/08/25 21:01:16)                    Impression:      No acute abnormality.      Electronically signed by: Bj Up  Date:    03/08/2025  Time:    21:01               Narrative:    EXAMINATION:  XR CHEST PA AND LATERAL    CLINICAL HISTORY:  Fever, unspecified    TECHNIQUE:  PA and lateral views of the chest were performed.    COMPARISON:  None    FINDINGS:  The lungs are well expanded and clear. No focal opacities are seen. The pleural spaces are clear. The cardiac silhouette is unremarkable. The visualized osseous structures are unremarkable.                                       Medications   amLODIPine tablet 10 mg (has no administration in time range)   flecainide tablet 50 mg (has no administration in time range)   metoprolol succinate (TOPROL-XL) 24 hr tablet 25 mg (has no administration in time range)   methocarbamoL tablet 1,000 mg (has no administration in time range)   insulin aspart U-100 injection 25 Units (has no administration in time range)   tamsulosin 24 hr capsule 0.4 mg (has no administration in time range)   sodium chloride 0.9% flush 3 mL (has no administration in time range)   melatonin tablet 9 mg (has no administration in time range)   ondansetron injection 8 mg (has no administration in time range)   acetaminophen tablet 650 mg (has no administration in time range)   simethicone chewable tablet 80 mg (has no administration in time range)   aluminum-magnesium hydroxide-simethicone 200-200-20 mg/5 mL suspension 30 mL (has no administration in time range)   acetaminophen tablet 650 mg (has no administration in time range)   naloxone 0.4 mg/mL injection 0.02 mg (has no administration in time range)   potassium bicarbonate disintegrating tablet 50 mEq (has no administration in time range)   potassium bicarbonate disintegrating tablet 35 mEq (has no administration in time range)   potassium bicarbonate disintegrating tablet 60 mEq (has no administration in time range)   magnesium oxide tablet 800 mg (has no  administration in time range)   magnesium oxide tablet 800 mg (has no administration in time range)   potassium, sodium phosphates 280-160-250 mg packet 2 packet (has no administration in time range)   potassium, sodium phosphates 280-160-250 mg packet 2 packet (has no administration in time range)   potassium, sodium phosphates 280-160-250 mg packet 2 packet (has no administration in time range)   glucose chewable tablet 16 g (has no administration in time range)   glucose chewable tablet 24 g (has no administration in time range)   dextrose 50% injection 12.5 g (has no administration in time range)   dextrose 50% injection 25 g (has no administration in time range)   glucagon (human recombinant) injection 1 mg (has no administration in time range)   0.9% NaCl infusion (has no administration in time range)   HYDROmorphone injection 1 mg (has no administration in time range)   morphine injection 4 mg (has no administration in time range)   insulin aspart U-100 pen 0-10 Units (has no administration in time range)   0.9%  NaCl infusion (for blood administration) (has no administration in time range)   cefTRIAXone injection 1 g (has no administration in time range)   iohexoL (OMNIPAQUE 350) 350 mg iodine/mL injection (75 mLs Intravenous Given 3/8/25 2154)   0.9% NaCl infusion ( Intravenous New Bag 3/8/25 2217)   insulin regular injection 8 Units 0.08 mL (8 Units Intravenous Given 3/8/25 2222)   morphine injection 10 mg (10 mg Intravenous Given 3/8/25 2217)   cefTRIAXone injection 1 g (1 g Intravenous Given 3/8/25 2309)     Medical Decision Making  64-year-old male presents with a 3 day history of flank pain with associated fever.  The source is not identified.  CT demonstrates a hematoma with no convincing evidence of abscess.  He has no significant leukocytosis.  He is found to have substantial worsening of his anemia with a hemoglobin of 7.  Hemoglobin was 8.9 upon discharge.  He is also found have LAUREN suggesting  hypovolemic hyponatremia.  He also has hyperglycemia with no convincing evidence of DKA.  He will be admitted for management of his electrolytes and treatment of suspected pyelonephritis demonstrated by significant pyuria.  I discussed the case with hospital medicine who will admit the patient.    Amount and/or Complexity of Data Reviewed  Labs: ordered.  Radiology: ordered.    Risk  OTC drugs.  Prescription drug management.  Decision regarding hospitalization.                                      Clinical Impression:  Final diagnoses:  [R50.9] Fever  [N17.9] LAUREN (acute kidney injury) (Primary)  [E86.0] Dehydration  [E87.1] Hyponatremia  [R73.9] Hyperglycemia  [D50.0] Anemia, blood loss          ED Disposition Condition    Admit                   Dean Olmstead III, MD  03/08/25 3675         [1]   Social History  Tobacco Use    Smoking status: Never    Smokeless tobacco: Current     Types: Chew   Substance Use Topics    Alcohol use: No    Drug use: No        Dean Olmstead III, MD  03/08/25 [id number]

## 2025-03-09 NOTE — H&P
[facility] Medicine  History & Physical    Patient Name: Joe Morgan  MRN: [id number]  Patient Class: IP- Inpatient  Admission Date: 3/8/2025  Attending Physician: Chris Raymundo MD   Primary Care Provider: Johnnie Gibbs MD         Patient information was obtained from patient, spouse/SO, past medical records, and ER records.     Subjective:     Principal Problem:LAUREN (acute kidney injury)    Chief Complaint:   Chief Complaint   Patient presents with    Fever     For 3 days, up to 103        HPI:   Chalo Morgan is a 64 year old male with a past medical history of DM, hip fracture, GERD, HTN, HLD, and paroxysmal Afib who was recently admitted to MICU for a retroperitoneal hematoma and hemorrhagic shock in January. He had an embolization of an artery secondary to the perinephric hematoma that was showing active contrast extravasation on CT. He states that he has been having constant pain to the right flank since the procedure but started having fever three days ago. He reports body aches. He denies urinary complaints. He denies cough or congestion, but reports a runny nose. He denies chest pain, sob, nausea or vomiting. He denies other complaint.    ED work up included a CBC, which shows a drop in H/H from 2/1/25 where he was 8.9/28.5 to today's values of 7.0/21.5. CMP demonstrates hyponatremia at 123, CO2 19, glucose 330, Creatinine 1.7, BUN  34. Albumin 1.9. Urinalysis positive for evidence of infection. EKG shows NSR. CXR shows no acute findings. CT of the abdomen and pelvis shows a right renal subcapsular hematoma and midline retroperitoneal hematoma that are similar in size to previous studies, along with mass effect on the right kidney with stable hypo densities. He reported that he has been having decreased PO intake, which is likely contributing to the low sodium (hypovolemic hyponatremia). Unclear etiology of the drop in hemoglobin. He will be initiated on Ceftriaxone  "and given IV fluid hydration. Hospital Medicine consulted for admission and further management.    Past Medical History:   Diagnosis Date    Diabetes mellitus     GERD (gastroesophageal reflux disease)     Hip discomfort     Hip fracture     Hyperlipidemia     Hypertension     Pancreas disorder     Spine pain        Past Surgical History:   Procedure Laterality Date    ANGIOGRAM, RENAL, BILATERAL, SELECTIVE, WITH S&I Bilateral 1/23/2025    Procedure: ANGIOGRAM, RENAL, BILATERAL, SELECTIVE, WITH S&I;  Surgeon: SurgeonCeleste;  Location: [facility];  Service: Anesthesiology;  Laterality: Bilateral;    FRACTURE SURGERY         Review of patient's allergies indicates:   Allergen Reactions    Nubain [nalbuphine] Anaphylaxis    Ativan [lorazepam] Other (See Comments)     Climbs wall         No current facility-administered medications on file prior to encounter.     Current Outpatient Medications on File Prior to Encounter   Medication Sig    amLODIPine (NORVASC) 10 MG tablet Take 1 tablet (10 mg total) by mouth once daily.    flecainide (TAMBOCOR) 50 MG Tab Take 50 mg by mouth 2 (two) times daily.    insulin syringe-needle U-100 0.3 mL 29 gauge x 1/2" Syrg 1 each by Misc.(Non-Drug; Combo Route) route 3 (three) times daily.    lisinopril (PRINIVIL,ZESTRIL) 40 MG tablet Take 1 tablet (40 mg total) by mouth once daily.    methocarbamoL (ROBAXIN) 500 MG Tab Take 1,000 mg by mouth 4 (four) times daily as needed.    metoprolol succinate (TOPROL-XL) 25 MG 24 hr tablet Take 25 mg by mouth once daily.    NOVOLOG U-100 INSULIN ASPART 100 unit/mL injection Inject 25 Units into the skin 3 (three) times daily.    ondansetron (ZOFRAN-ODT) 4 MG TbDL Take 4 mg by mouth every 6 (six) hours as needed (Nausea).    sulfamethoxazole-trimethoprim 800-160mg (BACTRIM DS) 800-160 mg Tab Take 1 tablet by mouth 2 (two) times daily.    tamsulosin (FLOMAX) 0.4 mg Cap Take 0.4 mg by mouth.    blood sugar diagnostic Strp 1 each by Misc.(Non-Drug; Combo " "Route) route 4 (four) times daily.    buprenorphine-naloxone 8-2 mg (SUBOXONE) 8-2 mg Place under the tongue.    insulin detemir (LEVEMIR FLEXTOUCH) 100 unit/mL (3 mL) SubQ InPn pen Inject 40 Units into the skin every evening.    oxybutynin (DITROPAN-XL) 5 MG TR24 Take 1 tablet (5 mg total) by mouth once daily.    pen needle, diabetic (PEN NEEDLE) 31 gauge x 1/4" Ndle 1 each by Misc.(Non-Drug; Combo Route) route 3 (three) times daily.     Family History    None       Tobacco Use    Smoking status: Never    Smokeless tobacco: Current     Types: Chew   Substance and Sexual Activity    Alcohol use: No    Drug use: No    Sexual activity: Yes     Partners: Male     Review of Systems   Constitutional:  Positive for fatigue and fever. Negative for chills.   HENT:  Positive for rhinorrhea. Negative for congestion and sore throat.    Eyes:  Negative for visual disturbance.   Respiratory:  Negative for cough and shortness of breath.    Cardiovascular:  Negative for chest pain and palpitations.   Gastrointestinal:  Negative for abdominal pain, constipation, diarrhea, nausea and vomiting.   Endocrine: Negative for cold intolerance and heat intolerance.   Genitourinary:  Positive for flank pain. Negative for dysuria and hematuria.   Musculoskeletal:  Positive for myalgias. Negative for arthralgias.   Skin:  Negative for rash.   Neurological:  Negative for tremors and seizures.   Hematological:  Negative for adenopathy. Does not bruise/bleed easily.   All other systems reviewed and are negative.    Objective:     Vital Signs (Most Recent):  Temp: 98.4 °F (36.9 °C) (03/09/25 0131)  Pulse: 82 (03/09/25 0131)  Resp: 17 (03/09/25 0131)  BP: (!) 142/64 (03/09/25 0131)  SpO2: 96 % (03/09/25 0131) Vital Signs (24h Range):  Temp:  [98 °F (36.7 °C)-99.8 °F (37.7 °C)] 98.4 °F (36.9 °C)  Pulse:  [65-82] 82  Resp:  [17-18] 17  SpO2:  [96 %-100 %] 96 %  BP: (104-154)/(56-70) 142/64     Weight: 98.2 kg (216 lb 7.9 oz)  Body mass index is 31.97 " kg/m².     Physical Exam  Vitals and nursing note reviewed.   Constitutional:       General: He is awake.      Appearance: Normal appearance. He is well-developed.   HENT:      Head: Normocephalic and atraumatic.      Nose: Nose normal. No septal deviation.   Eyes:      Conjunctiva/sclera: Conjunctivae normal.      Pupils: Pupils are equal, round, and reactive to light.   Neck:      Thyroid: No thyroid mass.      Vascular: No JVD.      Trachea: No tracheal tenderness or tracheal deviation.   Cardiovascular:      Rate and Rhythm: Normal rate and regular rhythm.      Heart sounds: Normal heart sounds, S1 normal and S2 normal. No murmur heard.     No friction rub. No gallop.   Pulmonary:      Effort: Pulmonary effort is normal.      Breath sounds: Normal breath sounds. No decreased breath sounds, wheezing, rhonchi or rales.   Abdominal:      General: Bowel sounds are normal. There is no distension.      Palpations: Abdomen is soft. There is no hepatomegaly, splenomegaly or mass.      Tenderness: There is no abdominal tenderness.   Skin:     General: Skin is warm and dry.      Capillary Refill: Capillary refill takes less than 2 seconds.      Coloration: Skin is pale.      Findings: No rash.   Neurological:      General: No focal deficit present.      Mental Status: He is alert and oriented to person, place, and time.      Cranial Nerves: No cranial nerve deficit.      Sensory: No sensory deficit.   Psychiatric:         Mood and Affect: Mood normal.         Behavior: Behavior normal. Behavior is cooperative.              CRANIAL NERVES     CN III, IV, VI   Pupils are equal, round, and reactive to light.       Significant Labs: All pertinent labs within the past 24 hours have been reviewed.  CBC:   Recent Labs   Lab 03/08/25 2056   WBC 7.13   HGB 7.0*   HCT 21.5*        CMP:   Recent Labs   Lab 03/08/25 2056   *   K 4.2   CL 96   CO2 19*   *   BUN 34*   CREATININE 1.7*   CALCIUM 8.3*   PROT 7.4    ALBUMIN 1.9*   BILITOT 0.3   ALKPHOS 138   AST 27   ALT 18   ANIONGAP 8     Lactic Acid:   Recent Labs   Lab 03/08/25 2056   LACTATE 1.1       POCT Glucose:   Recent Labs   Lab 03/08/25  2229 03/08/25  2307   POCTGLUCOSE 371* 285*       Urine Studies:   Recent Labs   Lab 03/08/25 2054   COLORU Yellow   APPEARANCEUA Hazy*   PHUR 6.0   SPECGRAV 1.020   PROTEINUA 2+*   GLUCUA 4+*   KETONESU Negative   BILIRUBINUA Negative   OCCULTUA 3+*   NITRITE Negative   UROBILINOGEN Negative   LEUKOCYTESUR 3+*   RBCUA 7*   WBCUA 52*   BACTERIA Moderate*   HYALINECASTS 0       Significant Imaging: I have reviewed all pertinent imaging results/findings within the past 24 hours.  Imaging Results              CT Abdomen Pelvis With IV Contrast NO Oral Contrast (Final result)  Result time 03/08/25 22:19:17      Final result by Bj Up DO (03/08/25 22:19:17)                   Impression:      1. Interval evolutional changes of a right renal subcapsular hematoma and midline retroperitoneal hematoma, similar in size to the prior study.  2. Continued mass effect on the right kidney, with stable hypodensities in the right kidney superior pole.  3. Nonspecific gallbladder wall thickening, likely reactive.  No cholelithiasis.      Electronically signed by: Bj Up  Date:    03/08/2025  Time:    22:19               Narrative:    EXAMINATION:  CT ABDOMEN PELVIS WITH IV CONTRAST    CLINICAL HISTORY:  Flank pain, kidney stone suspected;    TECHNIQUE:  Axial CT images with sagittal and coronal reformats were obtained of the abdomen and pelvis from the hemidiaphragms through the symphysis pubis after the administration of 75mL Omnipaque 350.    COMPARISON:  CTA abdomen and pelvis from 01/24/2025.    FINDINGS:  Lung Bases: Clear.    Heart: Heart size is normal.  No pericardial effusion.    Liver: The liver is enlarged and demonstrates homogeneous enhancement without focal lesion.  The portal vasculature is patent.    Biliary  tract: No intrahepatic or extrahepatic biliary ductal dilatation.    Gallbladder: Stable nonspecific wall thickening of the gallbladder.  There is no gallstone.    Pancreas: Severe atrophy or absence of the pancreatic body and tail segments.  The pancreatic head and neck are unremarkable.  No pancreatic ductal dilatation.    Spleen: Normal size without focal lesion.    Adrenals: Unremarkable.    Kidneys and urinary collecting systems: There is a large evolving right kidney subcapsular hematoma which extends into the midline of the abdomen.  Overall, the hematoma is similar in size when compared with the prior study dated 01/24/2025 and demonstrates interval expected evolutional changes, with development of hypoattenuation.  Several small peripheral hypodensities in the right kidney, predominantly in the superior pole, similar to the prior study.  The left kidney is unremarkable.  There is no hydronephrosis or nephrolithiasis.    Lymph nodes: None enlarged.    Stomach and bowel: The stomach is normal.  Loops of small and large bowel are normal in caliber without evidence for inflammation or obstruction.  The appendix is normal.  There is a moderate volume of stool, correlate for constipation.    Peritoneum and mesentery: No ascites or free intraperitoneal air.  No abdominal fluid collection.    Vasculature: No aneurysm or significant atherosclerosis.  There are postop changes of right testicular artery embolization.    Urinary bladder: There is urinary bladder wall thickening.    Reproductive organs: The prostate is not enlarged.    Body/chest wall: There is bilateral gynecomastia.  There is a left inguinal hernia containing mesenteric fat and trace fluid    Musculoskeletal: No aggressive osseous lesion.  Postop changes of the left sacroiliac joint.  There are degenerative changes.  There is a remote nonunited fracture of the left inferior pubic ramus.  There are several remote left-sided transverse process  fractures.                                       X-Ray Chest PA And Lateral (Final result)  Result time 03/08/25 21:01:16      Final result by Bj Up, DO (03/08/25 21:01:16)                   Impression:      No acute abnormality.      Electronically signed by: Bj Up  Date:    03/08/2025  Time:    21:01               Narrative:    EXAMINATION:  XR CHEST PA AND LATERAL    CLINICAL HISTORY:  Fever, unspecified    TECHNIQUE:  PA and lateral views of the chest were performed.    COMPARISON:  None    FINDINGS:  The lungs are well expanded and clear. No focal opacities are seen. The pleural spaces are clear. The cardiac silhouette is unremarkable. The visualized osseous structures are unremarkable.                                      Assessment/Plan:     * LAUREN (acute kidney injury)  LAUREN is likely due to pre-renal azotemia due to intravascular volume depletion. Baseline creatinine is  0.8 . Most recent creatinine and eGFR are listed below.  Recent Labs     03/08/25 2056   CREATININE 1.7*   EGFRNORACEVR 45*      Plan  - LAUREN is stable  - Avoid nephrotoxins and renally dose meds for GFR listed above  - Monitor urine output, serial BMP, and adjust therapy as needed  -     Hyponatremia  Hyponatremia is likely due to Dehydration/hypovolemia. The patient's most recent sodium results are listed below.  Recent Labs     03/08/25 2056   *     Plan  - Correct the sodium by 4-6mEq in 24 hours.   - Obtain the following studies: Urine sodium, urine osmolality, serum osmolality.  - Will treat the hyponatremia with IV fluids as follows: NS @100/hr  - Monitor sodium Daily.   - Patient hyponatremia is stable  Decreased PO intake, hypovolemia likely cause  -     UTI (urinary tract infection)  Follow culture  Was placed on bactrim outpatient, escalate to ceftriaxone      Acute blood loss anemia  Anemia is likely due to acute blood loss which was from retroperitoneal hematoma and chronic blood loss. Most recent  hemoglobin and hematocrit are listed below.  Recent Labs     03/08/25 2056   HGB 7.0*   HCT 21.5*     Plan  - Monitor serial CBC: Daily  - Transfuse PRBC if patient becomes hemodynamically unstable, symptomatic or H/H drops below 7/21.  - Patient has received 1 units of PRBCs on 3/9/25  - Patient's anemia is currently stable  -     Retroperitoneal hematoma  Patient's hemorrhage is due to  retroperitoneal hematoma , this bleeding may have been associated with an anticoagulant, the anticoagulant is Select Anticoagulant(s): Direct oral anticoagulant: Apixaban (Eliquis). Patients most recent Hgb, Hct, platelets, and INR are listed below.  Recent Labs     03/08/25 2056   HGB 7.0*   HCT 21.5*        Plan  - Will trend hemoglobin/hematocrit Daily  - Will monitor and correct any coagulation defects  - Will transfuse if Hgb is <7g/dl (<8g/dl in cases of active ACS) or if patient has rapid bleeding leading to hemodynamic instability  - consult urology-was due to follow up with an outpatient CT in the next week to check size  -CT shows no significant change in size in hematoma    Paroxysmal atrial fibrillation  Patient has paroxysmal (<7 days) atrial fibrillation. Patient is currently in sinus rhythm. [id number] Score: 1. The patients heart rate in the last 24 hours is as follows:  Pulse  Min: 65  Max: 81     Antiarrhythmics  , 2 times daily, Oral  flecainide tablet 50 mg, 2 times daily, Oral  metoprolol succinate (TOPROL-XL) 24 hr tablet 25 mg, Daily, Oral    Anticoagulants       Plan  - Replete lytes with a goal of K>4, Mg >2  - Patient is not anticoagulated   - Patient's afib is currently controlled  -         Essential hypertension  Patient's blood pressure range in the last 24 hours was: BP  Min: 104/58  Max: 141/67.The patient's inpatient anti-hypertensive regimen is listed below:  Current Antihypertensives  amLODIPine tablet 10 mg, Daily, Oral  metoprolol succinate (TOPROL-XL) 24 hr tablet 25 mg, Daily,  Oral    Plan  - BP is controlled, no changes needed to their regimen  -     Type 2 diabetes mellitus  Patient's FSGs are controlled on current medication regimen.  Last A1c reviewed-   Lab Results   Component Value Date    LABA1C 10.7 (H) 06/27/2018    HGBA1C 10.1 (H) 01/24/2025     Most recent fingerstick glucose reviewed-   Recent Labs   Lab 03/08/25 2229 03/08/25 2307   POCTGLUCOSE 371* 285*     Current correctional scale  Low  Maintain anti-hyperglycemic dose as follows-   Antihyperglycemics (From admission, onward)      Start     Stop Route Frequency Ordered    03/09/25 0900  insulin aspart U-100 injection 25 Units         -- SubQ 3 times daily 03/08/25 2304 03/09/25 0002  insulin aspart U-100 pen 0-10 Units         -- SubQ Before meals & nightly PRN 03/08/25 2303          Hold Oral hypoglycemics while patient is in the hospital.    Obesity  Body mass index is 31.97 kg/m². Morbid obesity complicates all aspects of disease management from diagnostic modalities to treatment. Weight loss encouraged and health benefits explained to patient.           VTE Risk Mitigation (From admission, onward)           Ordered     IP VTE HIGH RISK PATIENT  Once         03/08/25 2303     Place sequential compression device  Until discontinued         03/08/25 2303     Reason for No Pharmacological VTE Prophylaxis  Once        Question Answer Comment   Reasons: Risk of Bleeding    Reasons: Active Bleeding        03/08/25 2303                                    EVITA Recinos  Department of Hospital Medicine  [facility]/Surg

## 2025-03-09 NOTE — ASSESSMENT & PLAN NOTE
Patient's hemorrhage is due to  retroperitoneal hematoma , this bleeding may have been associated with an anticoagulant, the anticoagulant is Select Anticoagulant(s): Direct oral anticoagulant: Apixaban (Eliquis). Patients most recent Hgb, Hct, platelets, and INR are listed below.  Recent Labs     03/08/25 2056   HGB 7.0*   HCT 21.5*        Plan  - Will trend hemoglobin/hematocrit Daily  - Will monitor and correct any coagulation defects  - Will transfuse if Hgb is <7g/dl (<8g/dl in cases of active ACS) or if patient has rapid bleeding leading to hemodynamic instability  - consult urology-was due to follow up with an outpatient CT in the next week to check size  -CT shows no significant change in size in hematoma

## 2025-03-09 NOTE — ASSESSMENT & PLAN NOTE
Body mass index is 31.97 kg/m². Morbid obesity complicates all aspects of disease management from diagnostic modalities to treatment.

## 2025-03-09 NOTE — CONSULTS
Consult Note  Nephrology    Consult Requested By: Chris Raymundo MD    Reason for Consult:   LAUREN, hyponatremia in setting of possible UTI and R renal and retroperitoneal hematoma with mass effect     SUBJECTIVE:     History of Present Illness:   64 year old male with a past medical history of DM, hip fracture, GERD, HTN, HLD, and paroxysmal Afib who was recently admitted to MICU for a retroperitoneal hematoma and hemorrhagic shock in January. He had an embolization of an artery secondary to the perinephric hematoma that was showing active contrast extravasation on CT. He states that he has been having constant pain to the right flank since the procedure but started having fever three days ago. He reports body aches. He denies urinary complaints. ED work up included a CBC, which shows a drop in H/H from 2/1/25 where he was 8.9/28.5 to today's values of 7.0/21.5. CMP demonstrates hyponatremia at 123, CO2 19, glucose 330, Creatinine 1.7, BUN 34. Albumin 1.9. Urinalysis positive for evidence of infection. CT of the abdomen and pelvis shows a right renal subcapsular hematoma and midline retroperitoneal hematoma that are similar in size to previous studies, along with mass effect on the right kidney with stable hypo densities. He reported that he has been having decreased PO intake, which is likely contributing to the low sodium (hypovolemic hyponatremia). Unclear etiology of the drop in hemoglobin. He was initiated on Ceftriaxone and given IV fluid hydration. Nephrology is consulted for LAUREN, hyponatremia.    3/9  VSS, Hgb improved.  Na+ and renal function improving w/hydration.  UA noted.  On Bactrim per home med list, not getting it here.  Appetite has picked up considerably, states he barely ate breakfast, ate all of lunch.  Feeling better, pain is relieved for now.    Assessment/plan:    Hyponatremia, hypovolemic  LAUREN  Metabolic acidosis  IDDM  HTN    --urine sodium 57, urine osm 408.  Na+ improving.  Avoid  overcorrection.  Needs better glucose control. Encourage PO intake solid food.  --continue to hold bactrim.  Agree w/IVF.  Avoid nephrotoxic agents.  No NSAIDs, COXibs, or aminoglycoside antibiotics.  Dose all medications for level of renal function.  --no acute bicarb needs.  Monitor  --Blood glucose control per primary team.  --BP controlled.  Avoid hypotension, keep MAP >55      Past Medical History:   Diagnosis Date    Diabetes mellitus     GERD (gastroesophageal reflux disease)     Hip discomfort     Hip fracture     Hyperlipidemia     Hypertension     Pancreas disorder     Spine pain      Past Surgical History:   Procedure Laterality Date    ANGIOGRAM, RENAL, BILATERAL, SELECTIVE, WITH S&I Bilateral 1/23/2025    Procedure: ANGIOGRAM, RENAL, BILATERAL, SELECTIVE, WITH S&I;  Surgeon: Celeste Win;  Location: [facility];  Service: Anesthesiology;  Laterality: Bilateral;    FRACTURE SURGERY       No family history on file.  Social History[1]    Review of patient's allergies indicates:   Allergen Reactions    Nubain [nalbuphine] Anaphylaxis    Ativan [lorazepam] Other (See Comments)     Climbs wall          Review of Systems:  As above    OBJECTIVE:     Vital Signs Range (Last 24H):  Temp:  [98 °F (36.7 °C)-100.2 °F (37.9 °C)]   Pulse:  [65-87]   Resp:  [17-18]   BP: (104-154)/(55-70)   SpO2:  [93 %-100 %]     Physical Exam:  General- NAD noted  HEENT- WNL  Neck- supple  CV- Regular rate and rhythm  Resp- No increased WOB  GI- Non tender/non-distended,  Extrem- No cyanosis, clubbing, edema.  Derm- skin w/d  Neuro-  No flap.     Body mass index is 31.97 kg/m².    Laboratory:  CBC:   Recent Labs   Lab 03/09/25  1204   WBC 7.54   RBC 3.06*   HGB 8.2*   HCT 24.9*      MCV 81*   MCH 26.8*   MCHC 32.9     CMP:   Recent Labs   Lab 03/09/25  0839 03/09/25  1204   *  --    CALCIUM 9.0  --    ALBUMIN 2.0*  --    PROT 7.6  --    * 132*   K 4.6  --    CO2 19*  --      --    BUN 24*  --    CREATININE  1.3  --    ALKPHOS 144  --    ALT 21  --    AST 25  --    BILITOT 0.5  --      Recent Labs   Lab 03/08/25 2054   COLORU Yellow   SPECGRAV 1.020   PHUR 6.0   PROTEINUA 2+*   BACTERIA Moderate*   NITRITE Negative   LEUKOCYTESUR 3+*   UROBILINOGEN Negative   HYALINECASTS 0       Diagnostic Results:  Labs: Reviewed        ASSESSMENT/PLAN:     Active Hospital Problems    Diagnosis  POA    *LAUREN (acute kidney injury) [N17.9]  Yes    Hyponatremia [E87.1]  Yes    UTI (urinary tract infection) [N39.0]  Yes    Acute blood loss anemia [D62]  Yes    Retroperitoneal hematoma [K68.3]  Yes    Paroxysmal atrial fibrillation [I48.0]  Yes    Essential hypertension [I10]  Yes    Type 2 diabetes mellitus [E11.9]  Yes    Obesity [E66.9]  Yes      Resolved Hospital Problems   No resolved problems to display.         Thank you for allowing us to participate in the care of your patient. We will follow the patient and provide recommendations as needed.      Time spent seeing patient( greater than 1/2 spent in direct contact) :     Lisandra Arevalo NP         [1]   Social History  Tobacco Use    Smoking status: Never    Smokeless tobacco: Current     Types: Chew   Substance Use Topics    Alcohol use: No    Drug use: No

## 2025-03-09 NOTE — SUBJECTIVE & OBJECTIVE
"Past Medical History:   Diagnosis Date    Diabetes mellitus     GERD (gastroesophageal reflux disease)     Hip discomfort     Hip fracture     Hyperlipidemia     Hypertension     Pancreas disorder     Spine pain        Past Surgical History:   Procedure Laterality Date    ANGIOGRAM, RENAL, BILATERAL, SELECTIVE, WITH S&I Bilateral 1/23/2025    Procedure: ANGIOGRAM, RENAL, BILATERAL, SELECTIVE, WITH S&I;  Surgeon: Celeste Win;  Location: [facility];  Service: Anesthesiology;  Laterality: Bilateral;    FRACTURE SURGERY         Review of patient's allergies indicates:   Allergen Reactions    Nubain [nalbuphine] Anaphylaxis    Ativan [lorazepam] Other (See Comments)     Climbs wall         No current facility-administered medications on file prior to encounter.     Current Outpatient Medications on File Prior to Encounter   Medication Sig    amLODIPine (NORVASC) 10 MG tablet Take 1 tablet (10 mg total) by mouth once daily.    flecainide (TAMBOCOR) 50 MG Tab Take 50 mg by mouth 2 (two) times daily.    insulin syringe-needle U-100 0.3 mL 29 gauge x 1/2" Syrg 1 each by Misc.(Non-Drug; Combo Route) route 3 (three) times daily.    lisinopril (PRINIVIL,ZESTRIL) 40 MG tablet Take 1 tablet (40 mg total) by mouth once daily.    methocarbamoL (ROBAXIN) 500 MG Tab Take 1,000 mg by mouth 4 (four) times daily as needed.    metoprolol succinate (TOPROL-XL) 25 MG 24 hr tablet Take 25 mg by mouth once daily.    NOVOLOG U-100 INSULIN ASPART 100 unit/mL injection Inject 25 Units into the skin 3 (three) times daily.    ondansetron (ZOFRAN-ODT) 4 MG TbDL Take 4 mg by mouth every 6 (six) hours as needed (Nausea).    sulfamethoxazole-trimethoprim 800-160mg (BACTRIM DS) 800-160 mg Tab Take 1 tablet by mouth 2 (two) times daily.    tamsulosin (FLOMAX) 0.4 mg Cap Take 0.4 mg by mouth.    blood sugar diagnostic Strp 1 each by Misc.(Non-Drug; Combo Route) route 4 (four) times daily.    buprenorphine-naloxone 8-2 mg (SUBOXONE) 8-2 mg Place under " "the tongue.    insulin detemir (LEVEMIR FLEXTOUCH) 100 unit/mL (3 mL) SubQ InPn pen Inject 40 Units into the skin every evening.    oxybutynin (DITROPAN-XL) 5 MG TR24 Take 1 tablet (5 mg total) by mouth once daily.    pen needle, diabetic (PEN NEEDLE) 31 gauge x 1/4" Ndle 1 each by Misc.(Non-Drug; Combo Route) route 3 (three) times daily.     Family History    None       Tobacco Use    Smoking status: Never    Smokeless tobacco: Current     Types: Chew   Substance and Sexual Activity    Alcohol use: No    Drug use: No    Sexual activity: Yes     Partners: Male     Review of Systems   Constitutional:  Positive for fatigue and fever. Negative for chills.   HENT:  Positive for rhinorrhea. Negative for congestion and sore throat.    Eyes:  Negative for visual disturbance.   Respiratory:  Negative for cough and shortness of breath.    Cardiovascular:  Negative for chest pain and palpitations.   Gastrointestinal:  Negative for abdominal pain, constipation, diarrhea, nausea and vomiting.   Endocrine: Negative for cold intolerance and heat intolerance.   Genitourinary:  Positive for flank pain. Negative for dysuria and hematuria.   Musculoskeletal:  Positive for myalgias. Negative for arthralgias.   Skin:  Negative for rash.   Neurological:  Negative for tremors and seizures.   Hematological:  Negative for adenopathy. Does not bruise/bleed easily.   All other systems reviewed and are negative.    Objective:     Vital Signs (Most Recent):  Temp: 98.4 °F (36.9 °C) (03/09/25 0131)  Pulse: 82 (03/09/25 0131)  Resp: 17 (03/09/25 0131)  BP: (!) 142/64 (03/09/25 0131)  SpO2: 96 % (03/09/25 0131) Vital Signs (24h Range):  Temp:  [98 °F (36.7 °C)-99.8 °F (37.7 °C)] 98.4 °F (36.9 °C)  Pulse:  [65-82] 82  Resp:  [17-18] 17  SpO2:  [96 %-100 %] 96 %  BP: (104-154)/(56-70) 142/64     Weight: 98.2 kg (216 lb 7.9 oz)  Body mass index is 31.97 kg/m².     Physical Exam  Vitals and nursing note reviewed.   Constitutional:       General: He " is awake.      Appearance: Normal appearance. He is well-developed.   HENT:      Head: Normocephalic and atraumatic.      Nose: Nose normal. No septal deviation.   Eyes:      Conjunctiva/sclera: Conjunctivae normal.      Pupils: Pupils are equal, round, and reactive to light.   Neck:      Thyroid: No thyroid mass.      Vascular: No JVD.      Trachea: No tracheal tenderness or tracheal deviation.   Cardiovascular:      Rate and Rhythm: Normal rate and regular rhythm.      Heart sounds: Normal heart sounds, S1 normal and S2 normal. No murmur heard.     No friction rub. No gallop.   Pulmonary:      Effort: Pulmonary effort is normal.      Breath sounds: Normal breath sounds. No decreased breath sounds, wheezing, rhonchi or rales.   Abdominal:      General: Bowel sounds are normal. There is no distension.      Palpations: Abdomen is soft. There is no hepatomegaly, splenomegaly or mass.      Tenderness: There is no abdominal tenderness.   Skin:     General: Skin is warm and dry.      Capillary Refill: Capillary refill takes less than 2 seconds.      Coloration: Skin is pale.      Findings: No rash.   Neurological:      General: No focal deficit present.      Mental Status: He is alert and oriented to person, place, and time.      Cranial Nerves: No cranial nerve deficit.      Sensory: No sensory deficit.   Psychiatric:         Mood and Affect: Mood normal.         Behavior: Behavior normal. Behavior is cooperative.              CRANIAL NERVES     CN III, IV, VI   Pupils are equal, round, and reactive to light.       Significant Labs: All pertinent labs within the past 24 hours have been reviewed.  CBC:   Recent Labs   Lab 03/08/25 2056   WBC 7.13   HGB 7.0*   HCT 21.5*        CMP:   Recent Labs   Lab 03/08/25 2056   *   K 4.2   CL 96   CO2 19*   *   BUN 34*   CREATININE 1.7*   CALCIUM 8.3*   PROT 7.4   ALBUMIN 1.9*   BILITOT 0.3   ALKPHOS 138   AST 27   ALT 18   ANIONGAP 8     Lactic Acid:   Recent  Labs   Lab 03/08/25 2056   LACTATE 1.1       POCT Glucose:   Recent Labs   Lab 03/08/25  2229 03/08/25  2307   POCTGLUCOSE 371* 285*       Urine Studies:   Recent Labs   Lab 03/08/25 2054   COLORU Yellow   APPEARANCEUA Hazy*   PHUR 6.0   SPECGRAV 1.020   PROTEINUA 2+*   GLUCUA 4+*   KETONESU Negative   BILIRUBINUA Negative   OCCULTUA 3+*   NITRITE Negative   UROBILINOGEN Negative   LEUKOCYTESUR 3+*   RBCUA 7*   WBCUA 52*   BACTERIA Moderate*   HYALINECASTS 0       Significant Imaging: I have reviewed all pertinent imaging results/findings within the past 24 hours.  Imaging Results              CT Abdomen Pelvis With IV Contrast NO Oral Contrast (Final result)  Result time 03/08/25 22:19:17      Final result by Bj Up DO (03/08/25 22:19:17)                   Impression:      1. Interval evolutional changes of a right renal subcapsular hematoma and midline retroperitoneal hematoma, similar in size to the prior study.  2. Continued mass effect on the right kidney, with stable hypodensities in the right kidney superior pole.  3. Nonspecific gallbladder wall thickening, likely reactive.  No cholelithiasis.      Electronically signed by: Bj Up  Date:    03/08/2025  Time:    22:19               Narrative:    EXAMINATION:  CT ABDOMEN PELVIS WITH IV CONTRAST    CLINICAL HISTORY:  Flank pain, kidney stone suspected;    TECHNIQUE:  Axial CT images with sagittal and coronal reformats were obtained of the abdomen and pelvis from the hemidiaphragms through the symphysis pubis after the administration of 75mL Omnipaque 350.    COMPARISON:  CTA abdomen and pelvis from 01/24/2025.    FINDINGS:  Lung Bases: Clear.    Heart: Heart size is normal.  No pericardial effusion.    Liver: The liver is enlarged and demonstrates homogeneous enhancement without focal lesion.  The portal vasculature is patent.    Biliary tract: No intrahepatic or extrahepatic biliary ductal dilatation.    Gallbladder: Stable nonspecific  wall thickening of the gallbladder.  There is no gallstone.    Pancreas: Severe atrophy or absence of the pancreatic body and tail segments.  The pancreatic head and neck are unremarkable.  No pancreatic ductal dilatation.    Spleen: Normal size without focal lesion.    Adrenals: Unremarkable.    Kidneys and urinary collecting systems: There is a large evolving right kidney subcapsular hematoma which extends into the midline of the abdomen.  Overall, the hematoma is similar in size when compared with the prior study dated 01/24/2025 and demonstrates interval expected evolutional changes, with development of hypoattenuation.  Several small peripheral hypodensities in the right kidney, predominantly in the superior pole, similar to the prior study.  The left kidney is unremarkable.  There is no hydronephrosis or nephrolithiasis.    Lymph nodes: None enlarged.    Stomach and bowel: The stomach is normal.  Loops of small and large bowel are normal in caliber without evidence for inflammation or obstruction.  The appendix is normal.  There is a moderate volume of stool, correlate for constipation.    Peritoneum and mesentery: No ascites or free intraperitoneal air.  No abdominal fluid collection.    Vasculature: No aneurysm or significant atherosclerosis.  There are postop changes of right testicular artery embolization.    Urinary bladder: There is urinary bladder wall thickening.    Reproductive organs: The prostate is not enlarged.    Body/chest wall: There is bilateral gynecomastia.  There is a left inguinal hernia containing mesenteric fat and trace fluid    Musculoskeletal: No aggressive osseous lesion.  Postop changes of the left sacroiliac joint.  There are degenerative changes.  There is a remote nonunited fracture of the left inferior pubic ramus.  There are several remote left-sided transverse process fractures.                                       X-Ray Chest PA And Lateral (Final result)  Result time  03/08/25 21:01:16      Final result by Bj Up DO (03/08/25 21:01:16)                   Impression:      No acute abnormality.      Electronically signed by: Bj Up  Date:    03/08/2025  Time:    21:01               Narrative:    EXAMINATION:  XR CHEST PA AND LATERAL    CLINICAL HISTORY:  Fever, unspecified    TECHNIQUE:  PA and lateral views of the chest were performed.    COMPARISON:  None    FINDINGS:  The lungs are well expanded and clear. No focal opacities are seen. The pleural spaces are clear. The cardiac silhouette is unremarkable. The visualized osseous structures are unremarkable.

## 2025-03-09 NOTE — PLAN OF CARE
Problem: Adult Inpatient Plan of Care  Goal: Plan of Care Review  Outcome: Progressing  Goal: Patient-Specific Goal (Individualized)  Outcome: Progressing  Goal: Absence of Hospital-Acquired Illness or Injury  Outcome: Progressing  Goal: Optimal Comfort and Wellbeing  Outcome: Progressing     Problem: Acute Kidney Injury/Impairment  Goal: Fluid and Electrolyte Balance  Outcome: Progressing  Goal: Improved Oral Intake  Outcome: Progressing  Goal: Effective Renal Function  Outcome: Progressing

## 2025-03-09 NOTE — ASSESSMENT & PLAN NOTE
Patient has paroxysmal (<7 days) atrial fibrillation. Patient is currently in sinus rhythm. [id number] Score: 1. The patients heart rate in the last 24 hours is as follows:  Pulse  Min: 65  Max: 81     Antiarrhythmics  , 2 times daily, Oral  flecainide tablet 50 mg, 2 times daily, Oral  metoprolol succinate (TOPROL-XL) 24 hr tablet 25 mg, Daily, Oral    Anticoagulants       Plan  - Replete lytes with a goal of K>4, Mg >2  - Patient is not anticoagulated   - Patient's afib is currently controlled  -

## 2025-03-09 NOTE — ASSESSMENT & PLAN NOTE
Body mass index is 31.97 kg/m². Morbid obesity complicates all aspects of disease management from diagnostic modalities to treatment. Weight loss encouraged and health benefits explained to patient.

## 2025-03-09 NOTE — ASSESSMENT & PLAN NOTE
Patient's blood pressure range in the last 24 hours was: BP  Min: 104/58  Max: 141/67.The patient's inpatient anti-hypertensive regimen is listed below:  Current Antihypertensives  amLODIPine tablet 10 mg, Daily, Oral  metoprolol succinate (TOPROL-XL) 24 hr tablet 25 mg, Daily, Oral    Plan  - BP is controlled, no changes needed to their regimen  -

## 2025-03-09 NOTE — HPI
Chalo Morgan is a 64 year old male with a past medical history of DM, hip fracture, GERD, HTN, HLD, and paroxysmal Afib who was recently admitted to MICU for a retroperitoneal hematoma and hemorrhagic shock in January. He had an embolization of an artery secondary to the perinephric hematoma that was showing active contrast extravasation on CT. He states that he has been having constant pain to the right flank since the procedure but started having fever three days ago. He reports body aches. He denies urinary complaints. He denies cough or congestion, but reports a runny nose. He denies chest pain, sob, nausea or vomiting. He denies other complaint.    ED work up included a CBC, which shows a drop in H/H from 2/1/25 where he was 8.9/28.5 to today's values of 7.0/21.5. CMP demonstrates hyponatremia at 123, CO2 19, glucose 330, Creatinine 1.7, BUN  34. Albumin 1.9. Urinalysis positive for evidence of infection. EKG shows NSR. CXR shows no acute findings. CT of the abdomen and pelvis shows a right renal subcapsular hematoma and midline retroperitoneal hematoma that are similar in size to previous studies, along with mass effect on the right kidney with stable hypo densities. He reported that he has been having decreased PO intake, which is likely contributing to the low sodium (hypovolemic hyponatremia). Unclear etiology of the drop in hemoglobin. He will be initiated on Ceftriaxone and given IV fluid hydration. Hospital Medicine consulted for admission and further management.

## 2025-03-09 NOTE — ASSESSMENT & PLAN NOTE
Patient's blood pressure range in the last 24 hours was: BP  Min: 105/51  Max: 167/77.The patient's inpatient anti-hypertensive regimen is listed below:  Current Antihypertensives  amLODIPine tablet 10 mg, Daily, Oral  metoprolol succinate (TOPROL-XL) 24 hr tablet 25 mg, Daily, Oral    Plan  - BP is controlled, no changes needed to their regimen

## 2025-03-09 NOTE — SUBJECTIVE & OBJECTIVE
"Interval History: see "Hospital Course"    Review of Systems   Constitutional:  Positive for fatigue and fever.   Genitourinary:  Positive for flank pain.   Musculoskeletal:  Positive for myalgias.     Objective:     Vital Signs (Most Recent):  Temp: 100.2 °F (37.9 °C) (03/09/25 0737)  Pulse: 82 (03/09/25 0737)  Resp: 18 (03/09/25 0737)  BP: 138/65 (03/09/25 0737)  SpO2: 96 % (03/09/25 0737) Vital Signs (24h Range):  Temp:  [98 °F (36.7 °C)-100.2 °F (37.9 °C)] 100.2 °F (37.9 °C)  Pulse:  [65-87] 82  Resp:  [17-18] 18  SpO2:  [96 %-100 %] 96 %  BP: (104-154)/(56-70) 138/65     Weight: 98.2 kg (216 lb 7.9 oz)  Body mass index is 31.97 kg/m².    Intake/Output Summary (Last 24 hours) at 3/9/2025 0826  Last data filed at 3/9/2025 0632  Gross per 24 hour   Intake 840 ml   Output 1000 ml   Net -160 ml         Physical Exam  Vitals and nursing note reviewed.   Constitutional:       General: He is not in acute distress.     Appearance: He is obese.   HENT:      Head: Normocephalic and atraumatic.      Right Ear: External ear normal.      Nose: Nose normal.      Mouth/Throat:      Mouth: Mucous membranes are moist.   Eyes:      Extraocular Movements: Extraocular movements intact.      Conjunctiva/sclera: Conjunctivae normal.   Cardiovascular:      Rate and Rhythm: Normal rate and regular rhythm.      Pulses: Normal pulses.      Heart sounds: Normal heart sounds.   Pulmonary:      Effort: Pulmonary effort is normal.      Breath sounds: Normal breath sounds.   Abdominal:      Palpations: Abdomen is soft.      Tenderness: There is right CVA tenderness.   Musculoskeletal:         General: Normal range of motion.      Cervical back: Normal range of motion and neck supple.   Skin:     Coloration: Skin is pale.   Neurological:      Mental Status: He is alert. Mental status is at baseline.   Psychiatric:         Mood and Affect: Mood normal.         Behavior: Behavior normal.               Significant Labs: All pertinent labs within " the past 24 hours have been reviewed.    Significant Imaging: I have reviewed all pertinent imaging results/findings within the past 24 hours.

## 2025-03-09 NOTE — HOSPITAL COURSE
Joe Morgan is a 64 year old male with a past medical history of a R retroperitoneal and renal hematoma s/p arterial embolization, DM, HTN, Afib, BPH and obesity who presented with fever and R flank pain secondary to a possible UTI. He also presented with LAUREN and hyponatremia. CT abdomen and pelvis shows a stable R renal and retroperitoneal hematoma with mass effect. All anticoagulation was held the Hgb was trended and stable after he received one unit of PRBCs upon admission. The kidney function and sodium improved on NS IV fluids. He was on Rocephin but urine culture did not yield an organism. Nephrology and Urology were  consulted. He was discharged 3/10 with a course of doxycycline (stop Bactrim) and will follow up with his PCP.

## 2025-03-09 NOTE — PLAN OF CARE
[facility] - Med/Surg  Initial Discharge Assessment       Primary Care Provider: Moody Davenport NP    Admission Diagnosis: LAUREN (acute kidney injury) [N17.9]    Admission Date: 3/8/2025  Expected Discharge Date: 3/13/2025    Transition of Care Barriers: None    Payor: [employer] / Plan: [employer] [facility] [employer] [place] / Product Type: Commercial /     Extended Emergency Contact Information  Primary Emergency Contact: Mindi Morgan  Address: [address] [place] [address] [facility]  Mobile Phone: [phone]  Relation: Spouse  Preferred language: English   needed? No    Discharge Plan A: Home with family  Discharge Plan B: Home      [id number] - [place], [place] - [address] [facility]  [address]  [place] [address]  Phone: [phone] Fax: [phone]      DC assessment completed at bedside with patient. Verified information on facesheet as correct. Denies POA. Lives at listed address with spouse. PCP is Dr. Jovanni Blanton ([facility]). Reports last apt was about a month ago. [facility]acy [facility] [place]. Denies hh/hd/outpt services. Eliquis discontinued in Jan of this year. Dme- cane, glucometer and bp monitor. Independent at baseline. Uses cane PRN. Drives himself to apts. Reports that spouse will provide transportation home upon DC. Reports taking home medications as prescribed and can currently afford them. Denies recent inpt stay in last 30 days. Verified insurance on file. DC plan is home.     Initial Assessment (most recent)       Adult Discharge Assessment - 03/09/25 1249          Discharge Assessment    Assessment Type Discharge Planning Assessment     Confirmed/corrected address, phone number and insurance Yes     Confirmed Demographics Correct on Facesheet     Source of Information patient     Communicated JUSTA with patient/caregiver Yes     Reason For Admission lauren     People in  Home spouse     Facility Arrived From: home     Do you expect to return to your current living situation? Yes     Do you have help at home or someone to help you manage your care at home? Yes     Who are your caregiver(s) and their phone number(s)? spouse     Prior to hospitilization cognitive status: Alert/Oriented     Current cognitive status: Alert/Oriented     Walking or Climbing Stairs Difficulty no     Dressing/Bathing Difficulty no     Equipment Currently Used at Home cane, straight;glucometer;blood pressure machine     Readmission within 30 days? No     Patient currently being followed by outpatient case management? No     Do you currently have service(s) that help you manage your care at home? No     Do you take prescription medications? Yes     Do you have prescription coverage? Yes     Do you have any problems affording any of your prescribed medications? No     Is the patient taking medications as prescribed? yes     Who is going to help you get home at discharge? spouse     How do you get to doctors appointments? family or friend will provide     Are you on dialysis? No     Do you take coumadin? No     Discharge Plan A Home with family     Discharge Plan B Home     DME Needed Upon Discharge  none     Discharge Plan discussed with: Patient     Transition of Care Barriers None

## 2025-03-09 NOTE — ASSESSMENT & PLAN NOTE
In setting of R mass effect and possible UTI.  -IV fluids  -Renally dose medications  -Hold nephrotoxic agents  -Monitor UOP and electrolytes  -Trend creatinine

## 2025-03-09 NOTE — ASSESSMENT & PLAN NOTE
Patient's FSGs are controlled on current medication regimen.  Last A1c reviewed-   Lab Results   Component Value Date    LABA1C 10.7 (H) 06/27/2018    HGBA1C 10.1 (H) 01/24/2025     Most recent fingerstick glucose reviewed-   Recent Labs   Lab 03/08/25 2229 03/08/25 2307   POCTGLUCOSE 371* 285*     Current correctional scale  Low  Maintain anti-hyperglycemic dose as follows-   Antihyperglycemics (From admission, onward)      Start     Stop Route Frequency Ordered    03/09/25 0900  insulin aspart U-100 injection 25 Units         -- SubQ 3 times daily 03/08/25 2304 03/09/25 0002  insulin aspart U-100 pen 0-10 Units         -- SubQ Before meals & nightly PRN 03/08/25 2303          Hold Oral hypoglycemics while patient is in the hospital.

## 2025-03-09 NOTE — ASSESSMENT & PLAN NOTE
In setting of R renal and retroperitoneal hematoma.  -Iron studies  -Trend Hgb with CBC  -Hold any anticoagulation

## 2025-03-09 NOTE — ASSESSMENT & PLAN NOTE
LAUREN is likely due to pre-renal azotemia due to intravascular volume depletion. Baseline creatinine is  0.8 . Most recent creatinine and eGFR are listed below.  Recent Labs     03/08/25 2056   CREATININE 1.7*   EGFRNORACEVR 45*      Plan  - LAUREN is stable  - Avoid nephrotoxins and renally dose meds for GFR listed above  - Monitor urine output, serial BMP, and adjust therapy as needed  -

## 2025-03-09 NOTE — ASSESSMENT & PLAN NOTE
Anemia is likely due to acute blood loss which was from retroperitoneal hematoma and chronic blood loss. Most recent hemoglobin and hematocrit are listed below.  Recent Labs     03/08/25 2056   HGB 7.0*   HCT 21.5*     Plan  - Monitor serial CBC: Daily  - Transfuse PRBC if patient becomes hemodynamically unstable, symptomatic or H/H drops below 7/21.  - Patient has received 1 units of PRBCs on 3/9/25  - Patient's anemia is currently stable  -

## 2025-03-10 VITALS
BODY MASS INDEX: 32.07 KG/M2 | TEMPERATURE: 98 F | DIASTOLIC BLOOD PRESSURE: 69 MMHG | WEIGHT: 216.5 LBS | RESPIRATION RATE: 14 BRPM | OXYGEN SATURATION: 93 % | HEIGHT: 69 IN | HEART RATE: 66 BPM | SYSTOLIC BLOOD PRESSURE: 128 MMHG

## 2025-03-10 PROBLEM — D62 ACUTE BLOOD LOSS ANEMIA: Status: RESOLVED | Noted: 2025-01-30 | Resolved: 2025-03-10

## 2025-03-10 PROBLEM — D64.9 ANEMIA: Status: ACTIVE | Noted: 2025-03-10

## 2025-03-10 PROBLEM — N17.9 AKI (ACUTE KIDNEY INJURY): Status: RESOLVED | Noted: 2025-01-24 | Resolved: 2025-03-10

## 2025-03-10 LAB
ALBUMIN SERPL BCP-MCNC: 1.9 G/DL (ref 3.5–5.2)
ALP SERPL-CCNC: 125 U/L (ref 40–150)
ALT SERPL W/O P-5'-P-CCNC: 19 U/L (ref 10–44)
ANION GAP SERPL CALC-SCNC: 11 MMOL/L (ref 8–16)
AST SERPL-CCNC: 24 U/L (ref 10–40)
BACTERIA #/AREA URNS HPF: ABNORMAL /HPF
BASOPHILS # BLD AUTO: 0.02 K/UL (ref 0–0.2)
BASOPHILS NFR BLD: 0.3 % (ref 0–1.9)
BILIRUB SERPL-MCNC: 0.4 MG/DL (ref 0.1–1)
BILIRUB UR QL STRIP: NEGATIVE
BUN SERPL-MCNC: 17 MG/DL (ref 8–23)
CALCIUM SERPL-MCNC: 8.7 MG/DL (ref 8.7–10.5)
CHLORIDE SERPL-SCNC: 104 MMOL/L (ref 95–110)
CLARITY UR: CLEAR
CO2 SERPL-SCNC: 17 MMOL/L (ref 23–29)
COLOR UR: YELLOW
CREAT SERPL-MCNC: 1 MG/DL (ref 0.5–1.4)
DIFFERENTIAL METHOD BLD: ABNORMAL
EOSINOPHIL # BLD AUTO: 0.1 K/UL (ref 0–0.5)
EOSINOPHIL NFR BLD: 1.4 % (ref 0–8)
ERYTHROCYTE [DISTWIDTH] IN BLOOD BY AUTOMATED COUNT: 15.8 % (ref 11.5–14.5)
EST. GFR  (NO RACE VARIABLE): >60 ML/MIN/1.73 M^2
GLUCOSE SERPL-MCNC: 159 MG/DL (ref 70–110)
GLUCOSE UR QL STRIP: ABNORMAL
HCT VFR BLD AUTO: 27.2 % (ref 40–54)
HGB BLD-MCNC: 8.7 G/DL (ref 14–18)
HGB UR QL STRIP: ABNORMAL
HYALINE CASTS #/AREA URNS LPF: 0 /LPF
IMM GRANULOCYTES # BLD AUTO: 0.08 K/UL (ref 0–0.04)
IMM GRANULOCYTES NFR BLD AUTO: 1.2 % (ref 0–0.5)
KETONES UR QL STRIP: NEGATIVE
LEUKOCYTE ESTERASE UR QL STRIP: ABNORMAL
LYMPHOCYTES # BLD AUTO: 0.5 K/UL (ref 1–4.8)
LYMPHOCYTES NFR BLD: 8 % (ref 18–48)
MAGNESIUM SERPL-MCNC: 1.7 MG/DL (ref 1.6–2.6)
MCH RBC QN AUTO: 26.4 PG (ref 27–31)
MCHC RBC AUTO-ENTMCNC: 32 G/DL (ref 32–36)
MCV RBC AUTO: 82 FL (ref 82–98)
MICROSCOPIC COMMENT: ABNORMAL
MONOCYTES # BLD AUTO: 0.6 K/UL (ref 0.3–1)
MONOCYTES NFR BLD: 8.7 % (ref 4–15)
NEUTROPHILS # BLD AUTO: 5.3 K/UL (ref 1.8–7.7)
NEUTROPHILS NFR BLD: 80.4 % (ref 38–73)
NITRITE UR QL STRIP: NEGATIVE
NRBC BLD-RTO: 0 /100 WBC
PH UR STRIP: 6 [PH] (ref 5–8)
PHOSPHATE SERPL-MCNC: 2.8 MG/DL (ref 2.7–4.5)
PLATELET # BLD AUTO: 229 K/UL (ref 150–450)
PMV BLD AUTO: 9.2 FL (ref 9.2–12.9)
POCT GLUCOSE: 165 MG/DL (ref 70–110)
POCT GLUCOSE: 209 MG/DL (ref 70–110)
POTASSIUM SERPL-SCNC: 4.5 MMOL/L (ref 3.5–5.1)
PROT SERPL-MCNC: 7.5 G/DL (ref 6–8.4)
PROT UR QL STRIP: ABNORMAL
RBC # BLD AUTO: 3.3 M/UL (ref 4.6–6.2)
RBC #/AREA URNS HPF: 4 /HPF (ref 0–4)
SODIUM SERPL-SCNC: 132 MMOL/L (ref 136–145)
SP GR UR STRIP: 1.01 (ref 1–1.03)
SQUAMOUS #/AREA URNS HPF: 1 /HPF
URN SPEC COLLECT METH UR: ABNORMAL
UROBILINOGEN UR STRIP-ACNC: NEGATIVE EU/DL
WBC # BLD AUTO: 6.54 K/UL (ref 3.9–12.7)
WBC #/AREA URNS HPF: 92 /HPF (ref 0–5)
YEAST URNS QL MICRO: ABNORMAL

## 2025-03-10 PROCEDURE — 81000 URINALYSIS NONAUTO W/SCOPE: CPT | Performed by: INTERNAL MEDICINE

## 2025-03-10 PROCEDURE — 25000003 PHARM REV CODE 250: Performed by: STUDENT IN AN ORGANIZED HEALTH CARE EDUCATION/TRAINING PROGRAM

## 2025-03-10 PROCEDURE — 84295 ASSAY OF SERUM SODIUM: CPT | Performed by: STUDENT IN AN ORGANIZED HEALTH CARE EDUCATION/TRAINING PROGRAM

## 2025-03-10 PROCEDURE — 63600175 PHARM REV CODE 636 W HCPCS: Performed by: STUDENT IN AN ORGANIZED HEALTH CARE EDUCATION/TRAINING PROGRAM

## 2025-03-10 PROCEDURE — 84100 ASSAY OF PHOSPHORUS: CPT | Performed by: NURSE PRACTITIONER

## 2025-03-10 PROCEDURE — 83735 ASSAY OF MAGNESIUM: CPT | Performed by: NURSE PRACTITIONER

## 2025-03-10 PROCEDURE — 25000003 PHARM REV CODE 250: Performed by: NURSE PRACTITIONER

## 2025-03-10 PROCEDURE — 80053 COMPREHEN METABOLIC PANEL: CPT | Performed by: NURSE PRACTITIONER

## 2025-03-10 PROCEDURE — 85025 COMPLETE CBC W/AUTO DIFF WBC: CPT | Performed by: NURSE PRACTITIONER

## 2025-03-10 PROCEDURE — 63600175 PHARM REV CODE 636 W HCPCS: Mod: TB,JZ | Performed by: NURSE PRACTITIONER

## 2025-03-10 PROCEDURE — 36415 COLL VENOUS BLD VENIPUNCTURE: CPT | Performed by: NURSE PRACTITIONER

## 2025-03-10 PROCEDURE — 36415 COLL VENOUS BLD VENIPUNCTURE: CPT | Performed by: STUDENT IN AN ORGANIZED HEALTH CARE EDUCATION/TRAINING PROGRAM

## 2025-03-10 RX ORDER — DOXYCYCLINE 100 MG/1
100 CAPSULE ORAL 2 TIMES DAILY
Qty: 10 CAPSULE | Refills: 0 | Status: SHIPPED | OUTPATIENT
Start: 2025-03-10 | End: 2025-03-10 | Stop reason: HOSPADM

## 2025-03-10 RX ORDER — DOXYCYCLINE 100 MG/1
100 CAPSULE ORAL EVERY 12 HOURS
Qty: 10 CAPSULE | Refills: 0 | Status: SHIPPED | OUTPATIENT
Start: 2025-03-10 | End: 2025-03-15

## 2025-03-10 RX ORDER — DOXYCYCLINE 100 MG/1
100 CAPSULE ORAL EVERY 12 HOURS
Qty: 10 CAPSULE | Refills: 0 | Status: SHIPPED | OUTPATIENT
Start: 2025-03-10 | End: 2025-03-10

## 2025-03-10 RX ADMIN — INSULIN ASPART 2 UNITS: 100 INJECTION, SOLUTION INTRAVENOUS; SUBCUTANEOUS at 08:03

## 2025-03-10 RX ADMIN — METOPROLOL SUCCINATE 25 MG: 25 TABLET, EXTENDED RELEASE ORAL at 08:03

## 2025-03-10 RX ADMIN — Medication 800 MG: at 10:03

## 2025-03-10 RX ADMIN — FLECAINIDE ACETATE 50 MG: 50 TABLET ORAL at 08:03

## 2025-03-10 RX ADMIN — HYDROMORPHONE HYDROCHLORIDE 1 MG: 1 INJECTION, SOLUTION INTRAMUSCULAR; INTRAVENOUS; SUBCUTANEOUS at 05:03

## 2025-03-10 RX ADMIN — Medication 800 MG: at 06:03

## 2025-03-10 RX ADMIN — SODIUM CHLORIDE: 9 INJECTION, SOLUTION INTRAVENOUS at 01:03

## 2025-03-10 RX ADMIN — HYDROCODONE BITARTRATE AND ACETAMINOPHEN 1 TABLET: 5; 325 TABLET ORAL at 08:03

## 2025-03-10 RX ADMIN — HYDROMORPHONE HYDROCHLORIDE 1 MG: 1 INJECTION, SOLUTION INTRAMUSCULAR; INTRAVENOUS; SUBCUTANEOUS at 10:03

## 2025-03-10 RX ADMIN — INSULIN GLARGINE 40 UNITS: 100 INJECTION, SOLUTION SUBCUTANEOUS at 08:03

## 2025-03-10 RX ADMIN — AMLODIPINE BESYLATE 5 MG: 5 TABLET ORAL at 08:03

## 2025-03-10 RX ADMIN — INSULIN ASPART 4 UNITS: 100 INJECTION, SOLUTION INTRAVENOUS; SUBCUTANEOUS at 11:03

## 2025-03-10 RX ADMIN — HYDROMORPHONE HYDROCHLORIDE 1 MG: 1 INJECTION, SOLUTION INTRAMUSCULAR; INTRAVENOUS; SUBCUTANEOUS at 01:03

## 2025-03-10 NOTE — PLAN OF CARE
Pt clear for DC from case management standpoint. Discharging to home.  Attempted to contact PCP office to schedule hospital follow up, no answer, unable to leave message.  [facility] updated with office information and pt to call.    Contacted pharmacy.  Insurance does not cover the doxy that was orders, preferred medication doxycycline monohydrate.  Provider notified.  New rx received and provided to pt.  Explained importance of bringing rx to pharmacy.  Pt verbalized understanding.       03/10/25 1237   Final Note   Assessment Type Final Discharge Note   Anticipated Discharge Disposition Home

## 2025-03-10 NOTE — DISCHARGE SUMMARY
Counts include 234 beds at the [facility] Medicine  Discharge Summary      Patient Name: Joe Morgan  MRN: [id number]  [facility]: [id number]  Patient Class: IP- Inpatient  Admission Date: 3/8/2025  Hospital Length of Stay: 2 days  Discharge Date and Time: No discharge date for patient encounter.  Attending Physician: Chris Raymundo MD   Discharging Provider: Chris Raymundo MD  Primary Care Provider: Moody Davenport NP    Primary Care Team: Networked reference to record PCT     HPI:   Chalo Morgan is a 64 year old male with a past medical history of DM, hip fracture, GERD, HTN, HLD, and paroxysmal Afib who was recently admitted to MICU for a retroperitoneal hematoma and hemorrhagic shock in January. He had an embolization of an artery secondary to the perinephric hematoma that was showing active contrast extravasation on CT. He states that he has been having constant pain to the right flank since the procedure but started having fever three days ago. He reports body aches. He denies urinary complaints. He denies cough or congestion, but reports a runny nose. He denies chest pain, sob, nausea or vomiting. He denies other complaint.    ED work up included a CBC, which shows a drop in H/H from 2/1/25 where he was 8.9/28.5 to today's values of 7.0/21.5. CMP demonstrates hyponatremia at 123, CO2 19, glucose 330, Creatinine 1.7, BUN  34. Albumin 1.9. Urinalysis positive for evidence of infection. EKG shows NSR. CXR shows no acute findings. CT of the abdomen and pelvis shows a right renal subcapsular hematoma and midline retroperitoneal hematoma that are similar in size to previous studies, along with mass effect on the right kidney with stable hypo densities. He reported that he has been having decreased PO intake, which is likely contributing to the low sodium (hypovolemic hyponatremia). Unclear etiology of the drop in hemoglobin. He will be initiated on Ceftriaxone and given IV fluid hydration. [facility] Medicine  consulted for admission and further management.    * No surgery found *      Hospital Course:   Joe Morgan is a 64 year old male with a past medical history of a R retroperitoneal and renal hematoma s/p arterial embolization, DM, HTN, Afib, BPH and obesity who presented with fever and R flank pain secondary to a possible UTI. He also presented with LAUREN and hyponatremia. CT abdomen and pelvis shows a stable R renal and retroperitoneal hematoma with mass effect. All anticoagulation was held the Hgb was trended and stable after he received one unit of PRBCs upon admission. The kidney function and sodium improved on NS IV fluids. He was on Rocephin but urine culture did not yield an organism. Nephrology and Urology were  consulted. He was discharged 3/10 with a course of doxycycline (stop Bactrim) and will follow up with his PCP.     Goals of Care Treatment Preferences:  Code Status: Full Code      SDOH Screening:  The patient was screened for utility difficulties, food insecurity, transport difficulties, housing insecurity, and interpersonal safety and there were no concerns identified this admission.     Consults:   Consults (From admission, onward)          Status Ordering Provider     Inpatient consult to Nephrology  Once        Provider:  Hipolito Sanchez MD    Completed MUNIR JOINER     Inpatient consult to Urology  Once        Provider:  Keke Engel MD    Completed FREDY BLACK            Assessment & Plan  LAUREN (acute kidney injury) (Resolved: 3/10/2025)  In setting of R mass effect and possible UTI.  -IV fluids  -Renally dose medications  -Hold nephrotoxic agents  -Monitor UOP and electrolytes  -Trend creatinine  Hyponatremia  Improved with NS IV fluids.  UTI (urinary tract infection)  Culture negative.  -Continue Rocephin to doxycycline on discharge  Retroperitoneal hematoma  Stable on imaging.  -Urology consulted on admission  -Continue to monitor Hgb  Obesity  Body mass index is 31.97  kg/m². Morbid obesity complicates all aspects of disease management from diagnostic modalities to treatment.     Type 2 diabetes mellitus  Patient's FSGs are controlled on current medication regimen.  Last A1c reviewed-   Lab Results   Component Value Date    LABA1C 10.7 (H) 06/27/2018    HGBA1C 10.1 (H) 01/24/2025     Most recent fingerstick glucose reviewed-   Recent Labs   Lab 03/09/25  1109 03/09/25  1701 03/09/25  2019 03/10/25  0752   POCTGLUCOSE 65* 217* 130* 165*     Current correctional scale  Low  Maintain anti-hyperglycemic dose as follows-   Antihyperglycemics (From admission, onward)      Start     Stop Route Frequency Ordered    03/09/25 0930  insulin glargine U-100 (Lantus) pen 40 Units         -- SubQ Daily 03/09/25 0819    03/09/25 0900  insulin aspart U-100 pen 25 Units         -- SubQ 3 times daily 03/09/25 0115    03/09/25 0002  insulin aspart U-100 pen 0-10 Units         -- SubQ Before meals & nightly PRN 03/08/25 2303          Hold Oral hypoglycemics while patient is in the hospital.  Essential hypertension  Patient's blood pressure range in the last 24 hours was: BP  Min: 105/51  Max: 167/77.The patient's inpatient anti-hypertensive regimen is listed below:  Current Antihypertensives  amLODIPine tablet 10 mg, Daily, Oral  metoprolol succinate (TOPROL-XL) 24 hr tablet 25 mg, Daily, Oral    Plan  - BP is controlled, no changes needed to their regimen  Paroxysmal atrial fibrillation  -Continue home flecainide and metoprolol  -Telemetry    Anemia  Stable.  -S/p one unit PRBCs  Acute blood loss anemia (Resolved: 3/10/2025)  In setting of R renal and retroperitoneal hematoma.  -Iron studies  -Trend Hgb with CBC  -Hold any anticoagulation   Final Active Diagnoses:    Diagnosis Date Noted POA    Hyponatremia [E87.1] 03/08/2025 Yes    UTI (urinary tract infection) [N39.0] 03/08/2025 Yes    Retroperitoneal hematoma [K68.3] 01/24/2025 Yes    Anemia [D64.9] 03/10/2025 Yes    Paroxysmal atrial fibrillation  [I48.0] 06/25/2024 Yes    Essential hypertension [I10] 04/15/2016 Yes    Type 2 diabetes mellitus [E11.9] 10/08/2015 Yes    Obesity [E66.9] 10/08/2015 Yes      Problems Resolved During this Admission:    Diagnosis Date Noted Date Resolved POA    PRINCIPAL PROBLEM:  LAUREN (acute kidney injury) [N17.9] 01/24/2025 03/10/2025 Yes    Acute blood loss anemia [D62] 01/30/2025 03/10/2025 Yes       Discharged Condition: stable    Disposition: Home or Self Care    Follow Up:   Follow-up Information       Moody Davenport NP Follow up.    Specialty: Family Medicine  Contact information:  [address] [place], [place] [address] [address] [phone]                           Patient Instructions:      Diet diabetic     Notify your health care provider if you experience any of the following:  increased confusion or weakness     Notify your health care provider if you experience any of the following:  persistent dizziness, light-headedness, or visual disturbances     Notify your health care provider if you experience any of the following:  severe persistent headache     Notify your health care provider if you experience any of the following:  difficulty breathing or increased cough     Notify your health care provider if you experience any of the following:  severe uncontrolled pain     Notify your health care provider if you experience any of the following:  temperature >100.4     Activity as tolerated       Significant Diagnostic Studies: Labs: All labs within the past 24 hours have been reviewed    Pending Diagnostic Studies:       None           Medications:  Reconciled Home Medications:      Medication List        START taking these medications      doxycycline 100 MG Cap  Commonly known as: VIBRAMYCIN  Take 1 capsule (100 mg total) by mouth 2 (two) times daily. for 5 days            CONTINUE taking these medications      amLODIPine 10 MG tablet  Commonly known as: NORVASC  Take 1 tablet (10 mg total) by mouth once daily.     blood  "sugar diagnostic Strp  1 each by Misc.(Non-Drug; Combo Route) route 4 (four) times daily.     buprenorphine-naloxone 8-2 mg 8-2 mg  Commonly known as: SUBOXONE  Place under the tongue.     flecainide 50 MG Tab  Commonly known as: TAMBOCOR  Take 50 mg by mouth 2 (two) times daily.     insulin detemir U-100 (Levemir) 100 unit/mL (3 mL) Inpn pen  Inject 40 Units into the skin every evening.     insulin syringe-needle U-100 0.3 mL 29 gauge x 1/2" Syrg  1 each by Misc.(Non-Drug; Combo Route) route 3 (three) times daily.     lisinopriL 40 MG tablet  Commonly known as: PRINIVIL,ZESTRIL  Take 1 tablet (40 mg total) by mouth once daily.     methocarbamoL 500 MG Tab  Commonly known as: Robaxin  Take 1,000 mg by mouth 4 (four) times daily as needed.     metoprolol succinate 25 MG 24 hr tablet  Commonly known as: TOPROL-XL  Take 25 mg by mouth once daily.     NovoLOG U-100 Insulin aspart 100 unit/mL injection  Generic drug: insulin aspart U-100  Inject 25 Units into the skin 3 (three) times daily.     ondansetron 4 MG Tbdl  Commonly known as: ZOFRAN-ODT  Take 4 mg by mouth every 6 (six) hours as needed (Nausea).     oxybutynin 5 MG Tr24  Commonly known as: DITROPAN-XL  Take 1 tablet (5 mg total) by mouth once daily.     pen needle, diabetic 31 gauge x 1/4" Ndle  Commonly known as: PEN NEEDLE  1 each by Misc.(Non-Drug; Combo Route) route 3 (three) times daily.     tamsulosin 0.4 mg Cap  Commonly known as: FLOMAX  Take 0.4 mg by mouth.            STOP taking these medications      sulfamethoxazole-trimethoprim 800-160mg 800-160 mg Tab  Commonly known as: BACTRIM DS              Indwelling Lines/Drains at time of discharge:   Lines/Drains/Airways       None                   Time spent on the discharge of patient: 34 minutes         Chris Raymundo MD  Department of Hospital Medicine  [facility]/Surg  "

## 2025-03-10 NOTE — NURSING
IV and tele removed. Discharged instructions provided. All belongings gathered. No further complaints or requests. Pt left floor by wheelchair to discharge home.

## 2025-03-10 NOTE — CONSULTS
Interprofessional Telephone CONSULT Note    The patient location is: [facility]  The state in which patient is residing at time of visit: [place]  Visit type: AUDIO ONLY (TELEPHONE)  Total time spent on date of the encounter: 35 minutes    The chief complaint leading to consultation is: R RP hematoma    Date of Service: 03/09/2025  Keke Engel MD    Each patient to whom he or she provides medical services by telemedicine is:    (1) informed of the relationship between the physician and patient and the respective role of any other health care provider with respect to management of the patient; and   (2) notified that he or she may decline to receive medical services by telemedicine and may withdraw from such care at any time.  (3) Patient verbally consented to treatment via phone, according to the clinic consent to treat policies outlined by the registrar    This service was not originating from a related E/M service provided within the previous 7 days nor will  to an E/M service or procedure within the next 24 hours or my soonest available appointment.  Prevailing standard of care was able to be met in this audio-only visit.      Both a written plan and a verbal/internet discussion were discussed with the following physician consultant: Dr.Kissee Tapia [facility]  Group MD's:Toro/Brett/Marily/[place] NP's: Bianka Gibbons    PCP: Moody Davenport NP  Date of Service: 03/09/2025  Today's note written by: MD Toro          Subjective:      Initial consult by me in hospital on 3/9/25:  Joe Morgan is a 64 y.o. male with poorly controlled diabetes and h/o afib dx 7/2024.     Patient initially went to OSH with R flank pain. Found to have mild R hydro then a few days later a repeat ct on 1/14 showed R pyelo. Then started experiencing severe pain and a repeat ct showed 1/17 and was dx with large  subcapsular hematoma. H/h held. Seen by urology in [place] () and then transferred to [facility] for embolization. R testicular artery determined to be source of bleeding and embolized. Was eventually discharged home. Saw  on 3/6 to establish care.  Returned today to [facility] with acute onset of R flank pain. Repeat ct shows hematoma unchanged in size, no air in hematoma. H/h lower 7/21.5 from 8.9/28.5. transfused 1 unit and h/h now stable. He had not been on blood thinners. Wbc normal. Cr was 1.7 yesterday now 1.3. BS in the 330s. Ua with 3+leuk again and also c/o some uti sx (dysuria). Bladder scan checked and less than 100.  Started on rocephin      1/14/25 CTaP with mild R hydro ureter  1/17/25 ER visit for R flank pain- CTAP with showed R pyelo without hydro  1/22/25 CTAP with 3cm R subcapsular hematoma  1/23/25 CTAP with - large R hematoma with active extrav from RLP    1/23/25 IR embolization- Angiography of the right testicular artery, accessory right renal artery, right renal artery and branches demonstrates active extravasation from small right testicular artery branch.  Embolization of right testicular artery as described above  1/24/25 CTA -Right perirenal hematoma, likely in continuity with the pre-existing midline retroperitoneal hematoma, findings which are not significantly changed compared to prior exam.  3/8/25 ctap with There is a large evolving right kidney subcapsular hematoma which extends into the midline of the abdomen. Overall, the hematoma is similar in size when compared with the prior study dated 01/24/2025 and demonstrates interval expected evolutional changes, with development of hypoattenuation. Several small peripheral hypodensities in the right kidney, predominantly in the superior pole, similar to the prior study. The left kidney is unremarkable. There is no hydronephrosis or nephrolithiasis.         Urine history:   3/8/25  Pending, void: 3+leuk/7   2/25/25 Tr prot/25  "leuk  1/23/25 Ng  1/17/25 Large bld/tr leuk,  wbc/5-10 rbc  1/14/25 Mod bld/mod leuk, 30-50 wbc/5-10 rbc  9/5/24  Nit+/Tr bld/mod leuk, 30-60 wbc/5-10 rbc- c/o hesitancy  7/14/24 Nit+/neg leuk/tr bld, 10-15 wbc--- tx for uti when dx with afib  6/25/24 15-30 wbc      PSA history:    02/12/25 0.84  7/22/24 0.54  No results found for: "PSA"      Current REVIEW OF SYSTEMS:  Negative for the remaining 12 points of ROS except for as stated above       PMHx:  Past Medical History:   Diagnosis Date    Diabetes mellitus     GERD (gastroesophageal reflux disease)     Hip discomfort     Hip fracture     Hyperlipidemia     Hypertension     Pancreas disorder     Spine pain        PSHx:  Past Surgical History:   Procedure Laterality Date    ANGIOGRAM, RENAL, BILATERAL, SELECTIVE, WITH S&I Bilateral 1/23/2025    Procedure: ANGIOGRAM, RENAL, BILATERAL, SELECTIVE, WITH S&I;  Surgeon: Celeste Win;  Location: [facility];  Service: Anesthesiology;  Laterality: Bilateral;    FRACTURE SURGERY         Fam Hx:  Reviewed- pertinent family hx as above    Soc Hx:  Social History[1]    Allergies:  Nubain [nalbuphine] and Ativan [lorazepam]    Anticoagulation/Aspirin:     Objective:     Vitals:    03/09/25 1931   BP: 114/67   Pulse: 65   Resp: 18   Temp: 98.6 °F (37 °C)           Labs:              Assessment:     Joe Morgan is a 64 y.o. male with   1. LAUREN (acute kidney injury)    2. Fever    3. Dehydration    4. Hyponatremia    5. Hyperglycemia    6. Anemia, blood loss    7. Chest pain        urology consulted for:  R RP hematoma initially dx 1/17/25 re-admitted for drop in h/h and acute onset R flank pain. Ct shows stable size of hematoma. H/h now stable after 1 unit transfusion.     Plan:     Do not recommend a drain. Will worsen RP hematoma  Does not appear to have an active bleed-hold off on CTA and embolization  Pain control -pretreat for constipation to prevent straining  Change h/h to q8  Better diabetes control- at risk " of hematoma becoming infected  Continue rocephin and treat for possible uti  Fu with dr.douglas chen uro in [place] (page kidney risk)                                                                                                                                                                                                                                                                                                                                                                                                                                                                                                                                                                                                                                                                                                                                                                                                                                                                                                                                                                                                                                                                                                                                                                                                                                                                                                                   Keke Engel MD          [1]   Social History  Tobacco Use    Smoking status: Never    Smokeless tobacco: Current     Types: Chew   Substance Use Topics    Alcohol use: No    Drug use: No

## 2025-03-11 LAB — BACTERIA UR CULT: NO GROWTH

## 2025-03-14 ENCOUNTER — PATIENT OUTREACH (OUTPATIENT)
Dept: ADMINISTRATIVE | Facility: CLINIC | Age: 65
End: 2025-03-14
Payer: COMMERCIAL

## 2025-03-14 NOTE — PROGRESS NOTES
C3 nurse attempted to contact Joe Morgan  for a TCC post hospital discharge follow up call. No answer. The patient does not have a scheduled HOSFU appointment, and the pt does not have an [facility] PCP.